# Patient Record
Sex: FEMALE | Race: WHITE | Employment: OTHER | RURAL
[De-identification: names, ages, dates, MRNs, and addresses within clinical notes are randomized per-mention and may not be internally consistent; named-entity substitution may affect disease eponyms.]

---

## 2017-02-15 ENCOUNTER — OFFICE VISIT (OUTPATIENT)
Dept: FAMILY MEDICINE CLINIC | Age: 72
End: 2017-02-15

## 2017-02-15 VITALS
DIASTOLIC BLOOD PRESSURE: 72 MMHG | OXYGEN SATURATION: 98 % | WEIGHT: 213 LBS | HEART RATE: 82 BPM | BODY MASS INDEX: 33.36 KG/M2 | TEMPERATURE: 99 F | RESPIRATION RATE: 18 BRPM | SYSTOLIC BLOOD PRESSURE: 130 MMHG

## 2017-02-15 DIAGNOSIS — R05.9 COUGH: ICD-10-CM

## 2017-02-15 DIAGNOSIS — I48.20 CHRONIC ATRIAL FIBRILLATION (HCC): Primary | ICD-10-CM

## 2017-02-15 RX ORDER — BENZONATATE 100 MG/1
100 CAPSULE ORAL
Qty: 30 CAP | Refills: 5 | Status: SHIPPED | OUTPATIENT
Start: 2017-02-15 | End: 2017-02-22

## 2017-02-15 RX ORDER — HYDROCODONE POLISTIREX AND CHLORPHENIRAMINE POLISTIREX 10; 8 MG/5ML; MG/5ML
5 SUSPENSION, EXTENDED RELEASE ORAL
Qty: 473 ML | Refills: 0 | Status: SHIPPED | OUTPATIENT
Start: 2017-02-15 | End: 2017-11-15

## 2017-02-15 NOTE — MR AVS SNAPSHOT
Visit Information Date & Time Provider Department Dept. Phone Encounter #  
 2/15/2017 11:00 AM Castillo Do MD [address] [id number] Upcoming Health Maintenance Date Due Hepatitis C Screening 1945 FOOT EXAM Q1 1/5/1955 MICROALBUMIN Q1 1/5/1955 EYE EXAM RETINAL OR DILATED Q1 1/5/1955 DTaP/Tdap/Td series (1 - Tdap) 1/5/1966 FOBT Q 1 YEAR AGE 50-75 1/5/1995 GLAUCOMA SCREENING Q2Y 1/5/2010 Pneumococcal 65+ Low/Medium Risk (2 of 2 - PPSV23) 2/3/2016 MEDICARE YEARLY EXAM 1/7/2017 HEMOGLOBIN A1C Q6M 3/21/2017 LIPID PANEL Q1 9/21/2017 BREAST CANCER SCRN MAMMOGRAM 4/18/2018 Allergies as of 2/15/2017  Review Complete On: 2/15/2017 By: Castillo Do MD  
  
 Severity Noted Reaction Type Reactions Penicillins  11/14/2013    Unknown (comments) Current Immunizations  Never Reviewed Name Date Influenza Vaccine 9/30/2015, 10/7/2014 Influenza Vaccine (Quad) PF 9/21/2016 10:59 AM  
 Pneumococcal Conjugate (PCV-13) 2/3/2015 Pneumococcal Vaccine (Unspecified Type) 1/25/2008 Varicella Virus Vaccine 6/25/2011 Not reviewed this visit You Were Diagnosed With   
  
 Codes Comments Chronic atrial fibrillation (HCC)    -  Primary ICD-10-CM: D83.4 ICD-9-CM: 427.31 Cough     ICD-10-CM: R05 ICD-9-CM: 141. 2 Vitals BP Pulse Temp Resp Weight(growth percentile) SpO2  
 130/72 (BP 1 Location: Left arm, BP Patient Position: Sitting) 82 99 °F (37.2 °C) (Oral) 18 213 lb (96.6 kg) 98% BMI OB Status Smoking Status 33.36 kg/m2 Hysterectomy Never Smoker Vitals History BMI and BSA Data Body Mass Index Body Surface Area  
 33.36 kg/m 2 2.14 m 2 Preferred Pharmacy Pharmacy Name [address] [phone] Your Updated Medication List  
  
   
 This list is accurate as of: 2/15/17 12:03 PM.  Always use your most recent med list. amLODIPine 10 mg tablet Commonly known as:  [place] Royals Take 1 Tab by mouth daily. apixaban 5 mg tablet Commonly known as:  Princella Fraire Take 1 Tab by mouth two (2) times a day. atorvastatin 20 mg tablet Commonly known as:  LIPITOR  
TAKE 1 TABLET TWICE A DAY  
  
 benzonatate 100 mg capsule Commonly known as:  TESSALON Take 1 Cap by mouth three (3) times daily as needed for Cough for up to 7 days. chlorpheniramine-HYDROcodone 10-8 mg/5 mL suspension Commonly known as:  Milon Benedict Take 5 mL by mouth every twelve (12) hours as needed for Cough. Max Daily Amount: 10 mL. levothyroxine 137 mcg tablet Commonly known as:  SYNTHROID Take 137 mcg by mouth Daily (before breakfast). lisinopril 20 mg tablet Commonly known as:  PRINIVIL, ZESTRIL  
TAKE 1 TABLET DAILY LORazepam 0.5 mg tablet Commonly known as:  ATIVAN Take 1 Tab by mouth every eight (8) hours as needed for Anxiety. Max Daily Amount: 1.5 mg.  
  
 meclizine 25 mg tablet Commonly known as:  ANTIVERT Take  by mouth three (3) times daily as needed. metFORMIN 500 mg tablet Commonly known as:  GLUCOPHAGE Take 1 Tab by mouth three (3) times daily (with meals). omeprazole 40 mg capsule Commonly known as:  PRILOSEC Take 1 Cap by mouth daily. * ONE TOUCH TEST  
by Does Not Apply route daily. Indications: One Touch Verioflex Meter * ONETOUCH ULTRA TEST strip Generic drug:  glucose blood VI test strips  
by Does Not Apply route See Admin Instructions. ONETOUCH FINEPOINT LANCETS  
by Does Not Apply route daily. Indications: Verioflex lancets, E. 11.9  
  
 sertraline 50 mg tablet Commonly known as:  ZOLOFT  
TAKE 1 TABLET NIGHTLY AS NEEDED  
  
 therapeutic multivitamin tablet Commonly known as:  [facility] Take 1 Tab by mouth daily. * Notice: This list has 2 medication(s) that are the same as other medications prescribed for you. Read the directions carefully, and ask your doctor or other care provider to review them with you. Prescriptions Printed Refills  
 benzonatate (TESSALON) 100 mg capsule 5 Sig: Take 1 Cap by mouth three (3) times daily as needed for Cough for up to 7 days. Class: Print Route: Oral  
 chlorpheniramine-HYDROcodone (TUSSIONEX) 10-8 mg/5 mL suspension 0 Sig: Take 5 mL by mouth every twelve (12) hours as needed for Cough. Max Daily Amount: 10 mL. Class: Print Route: Oral  
  
Introducing [facility]! Dear Amy Wilson: 
Thank you for requesting a Flock account. Our records indicate that you already have an active Flock account. You can access your account anytime at [email or web address] Did you know that you can access your hospital and ER discharge instructions at any time in Flock? You can also review all of your test results from your hospital stay or ER visit. Additional Information If you have questions, please visit the Frequently Asked Questions section of the Flock website at [email or web address] Remember, Flock is NOT to be used for urgent needs. For medical emergencies, dial 911. Now available from your iPhone and Android! Please provide this summary of care documentation to your next provider. Your primary care clinician is listed as Nicole Castro. If you have any questions after today's visit, please call [phone].

## 2017-02-15 NOTE — PROGRESS NOTES
Chief Complaint   Patient presents with    Cold Symptoms     cough, shoulder and neck pain, sore throat. HPI:       is a 67 y.o. female. Ngoc Escalona had AF and underwent Cryoballoon ablation of her pulmonary veins at [facility] with Dr Vladislav Velazquez team on October 24, 2015. The results were excellent and she had no inducible arrhythmias. Ngoc Escalona was discharged on Apixaban and Dofetilide. The plan is to discontinue the Dofetilide in about 90 days (end of Jan 2016) if she remains in NSR. She has hypothyroidism and is on thyroid replacement therapy. Ngoc Escalona was noted to have an A1c of 9.2 in early October 2015. She is taking Metformin, Lisinopril and Atorvastatin. She had previously been well controlled with diet and exercise. New Issues:  Nonproductive cough 4 days. No bloody mucus, fever, rigors, drenching sweats. Allergies   Allergen Reactions    Penicillins Unknown (comments)       Current Outpatient Prescriptions   Medication Sig    lisinopril (PRINIVIL, ZESTRIL) 20 mg tablet TAKE 1 TABLET DAILY    sertraline (ZOLOFT) 50 mg tablet TAKE 1 TABLET NIGHTLY AS NEEDED    atorvastatin (LIPITOR) 20 mg tablet TAKE 1 TABLET TWICE A DAY    levothyroxine (SYNTHROID) 137 mcg tablet Take 137 mcg by mouth Daily (before breakfast).  omeprazole (PRILOSEC) 40 mg capsule Take 1 Cap by mouth daily.  amLODIPine (NORVASC) 10 mg tablet Take 1 Tab by mouth daily.  metFORMIN (GLUCOPHAGE) 500 mg tablet Take 1 Tab by mouth three (3) times daily (with meals).  apixaban (ELIQUIS) 5 mg tablet Take 1 Tab by mouth two (2) times a day.  BLOOD SUGAR DIAGNOSTIC (ONE TOUCH TEST) by Does Not Apply route daily. Indications: One Touch Verioflex Meter    ONETOUCH FINEPOINT LANCETS by Does Not Apply route daily. Indications: Verioflex lancets, E. 11.9    glucose blood VI test strips (ONETOUCH ULTRA TEST) strip by Does Not Apply route See Admin Instructions.     LORazepam (ATIVAN) 0.5 mg tablet Take 1 Tab by mouth every eight (8) hours as needed for Anxiety. Max Daily Amount: 1.5 mg.    meclizine (ANTIVERT) 25 mg tablet Take  by mouth three (3) times daily as needed.  therapeutic multivitamin (THERAGRAN) tablet Take 1 Tab by mouth daily. No current facility-administered medications for this visit. Past Medical History   Diagnosis Date    Atrial fibrillation [facility])     Cholecystitis July 6, 2016    Diabetes [facility])     Dizziness     Hyperlipidemia     Hypertension     Hypothyroidism     OA (osteoarthritis) of knee     Pain, wrist      Carpal tunnel?  Unspecified sleep apnea      CPAP mask         ROS:  Denies nausea, vomiting, or diarrhea. Denies wt loss, wt gain, hemoptysis, hematochezia or melena. Physical Examination:    Visit Vitals    /72 (BP 1 Location: Left arm, BP Patient Position: Sitting)    Pulse 82    Temp 99 °F (37.2 °C) (Oral)    Resp 18    Wt 213 lb (96.6 kg)    SpO2 98%    BMI 33.36 kg/m2     General: Alert and Ox3, Fluent speech  HEENT:  NC/AT, EOMI, OP: clear  Neck:  Supple, no adenopathy, JVD, mass or bruit  Chest:  Clear to Ausculation, without wheezes, rales, rubs or ronchi  Cardiac: RRR  Abdomen:  +BS, soft, nontender without palpable HSM  Extremities:  No cyanosis, clubbing or edema  Neurologic:  Ambulatory without assist, CN 2-12 grossly intact. Moves all extremities. Skin: no rash  Lymphadenopathy: no cervical or supraclavicular nodes    Wt Readings from Last 3 Encounters:   02/15/17 213 lb (96.6 kg)   09/21/16 213 lb 6.4 oz (96.8 kg)   07/20/16 213 lb (96.6 kg)       Lab Results   Component Value Date/Time    Hemoglobin A1c 8.8 09/21/2016 10:50 AM       Lab Results   Component Value Date/Time    TSH 0.283 09/21/2016 10:50 AM           ASSESSMENT AND PLAN:     1.  AF:  Stable post ablation  2. Viral URI with cough:  Symptomatic relief with Tessalon and TUssionex at HS.   The patient was advised to return to (or contact) the clinic or go to the ER for any ALARM sx such as temp > 101.5, worsening cough, sputum production, confusion or shortness of breath. No orders of the defined types were placed in this encounter.       Karlos Kwok MD, [address]

## 2017-08-09 ENCOUNTER — TELEPHONE (OUTPATIENT)
Dept: FAMILY MEDICINE CLINIC | Age: 72
End: 2017-08-09

## 2017-08-09 ENCOUNTER — OFFICE VISIT (OUTPATIENT)
Dept: FAMILY MEDICINE CLINIC | Age: 72
End: 2017-08-09

## 2017-08-09 VITALS
OXYGEN SATURATION: 95 % | RESPIRATION RATE: 16 BRPM | HEART RATE: 98 BPM | SYSTOLIC BLOOD PRESSURE: 128 MMHG | DIASTOLIC BLOOD PRESSURE: 68 MMHG | BODY MASS INDEX: 34.14 KG/M2 | WEIGHT: 218 LBS

## 2017-08-09 DIAGNOSIS — R07.89 CHEST PRESSURE: ICD-10-CM

## 2017-08-09 DIAGNOSIS — I48.20 CHRONIC ATRIAL FIBRILLATION (HCC): ICD-10-CM

## 2017-08-09 DIAGNOSIS — R00.2 PALPITATIONS: ICD-10-CM

## 2017-08-09 DIAGNOSIS — R00.2 HEART PALPITATIONS: ICD-10-CM

## 2017-08-09 DIAGNOSIS — Z00.00 MEDICARE ANNUAL WELLNESS VISIT, SUBSEQUENT: Primary | ICD-10-CM

## 2017-08-09 DIAGNOSIS — E11.9 TYPE 2 DIABETES MELLITUS WITHOUT COMPLICATION, WITHOUT LONG-TERM CURRENT USE OF INSULIN (HCC): ICD-10-CM

## 2017-08-09 DIAGNOSIS — E03.4 HYPOTHYROIDISM DUE TO ACQUIRED ATROPHY OF THYROID: ICD-10-CM

## 2017-08-09 RX ORDER — MELATONIN
DAILY
COMMUNITY
End: 2017-08-15 | Stop reason: ALTCHOICE

## 2017-08-09 RX ORDER — LORAZEPAM 0.5 MG/1
0.5 TABLET ORAL
Qty: 30 TAB | Refills: 1 | Status: SHIPPED | OUTPATIENT
Start: 2017-08-09 | End: 2019-04-05

## 2017-08-09 NOTE — ACP (ADVANCE CARE PLANNING)
Chief Complaint   Patient presents with    Chest Pain     heaviness, palpitations         HPI:       is a 67 y.o. female. History of PAF. Notes she is under stress. Lorazepam has helped. chest pressure all day for the past several days. No abdominal pain. Has never been cathed. Last nuclear EST several years ago and was normal.       Denies SOB or leg edema. T2DM:  Has not had labs in several months. New Issues:  Due for SAWV    Allergies   Allergen Reactions    Penicillins Unknown (comments)       Current Outpatient Prescriptions   Medication Sig    cholecalciferol (VITAMIN D3) 1,000 unit tablet Take  by mouth daily.  LORazepam (ATIVAN) 0.5 mg tablet Take 1 Tab by mouth every eight (8) hours as needed for Anxiety. Max Daily Amount: 1.5 mg.    amLODIPine (NORVASC) 10 mg tablet TAKE 1 TABLET DAILY    lisinopril (PRINIVIL, ZESTRIL) 20 mg tablet TAKE 1 TABLET DAILY    sertraline (ZOLOFT) 50 mg tablet TAKE 1 TABLET NIGHTLY AS NEEDED    levothyroxine (SYNTHROID) 137 mcg tablet Take 137 mcg by mouth Daily (before breakfast).  omeprazole (PRILOSEC) 40 mg capsule Take 1 Cap by mouth daily.  metFORMIN (GLUCOPHAGE) 500 mg tablet Take 1 Tab by mouth three (3) times daily (with meals).  apixaban (ELIQUIS) 5 mg tablet Take 1 Tab by mouth two (2) times a day.  BLOOD SUGAR DIAGNOSTIC (ONE TOUCH TEST) by Does Not Apply route daily. Indications: One Touch Verioflex Meter    ONETOUCH FINEPOINT LANCETS by Does Not Apply route daily. Indications: Verioflex lancets, E. 11.9    glucose blood VI test strips (ONETOUCH ULTRA TEST) strip by Does Not Apply route See Admin Instructions.  therapeutic multivitamin (THERAGRAN) tablet Take 1 Tab by mouth daily.  chlorpheniramine-HYDROcodone (TUSSIONEX) 10-8 mg/5 mL suspension Take 5 mL by mouth every twelve (12) hours as needed for Cough. Max Daily Amount: 10 mL.     atorvastatin (LIPITOR) 20 mg tablet TAKE 1 TABLET TWICE A DAY    meclizine (ANTIVERT) 25 mg tablet Take  by mouth three (3) times daily as needed. No current facility-administered medications for this visit. Past Medical History:   Diagnosis Date    Atrial fibrillation [facility])     Cholecystitis July 6, 2016    Diabetes [facility])     Dizziness     Hyperlipidemia     Hypertension     Hypothyroidism     OA (osteoarthritis) of knee     Pain, wrist     Carpal tunnel?  Unspecified sleep apnea     CPAP mask         ROS:  Denies fever, chills, cough, chest pain, SOB,  nausea, vomiting, or diarrhea. Denies wt loss, wt gain, hemoptysis, hematochezia or melena. Physical Examination:    /68 (BP 1 Location: Left arm, BP Patient Position: Sitting)  Pulse 98  Resp 16  Wt 218 lb (98.9 kg)  SpO2 95%  BMI 34.14 kg/m2    General: Alert and Ox3, Fluent speech  HEENT:  NC/AT, EOMI, OP: clear  Neck:  Supple, no adenopathy, JVD, mass or bruit  Chest:  Clear to Ausculation, without wheezes, rales, rubs or ronchi  Cardiac: RRR with a soft 2/6 TOM  Abdomen:  +BS, soft, nontender without palpable HSM  Extremities:  No cyanosis, clubbing or edema  Neurologic:  Ambulatory without assist, CN 2-12 grossly intact. Moves all extremities. Skin: no rash  Lymphadenopathy: no cervical or supraclavicular nodes      ASSESSMENT AND PLAN:     1. Chest pressure with normal EKG today:  Arrange for nuclear EST, CXR and ECHO. 2.  T2DM:  Labs today  3. SAWV:    4.  Will review data with José Miguel Thomas next week and consider other options if sx remain  5. Understands she is to go to the ER if sx escalate.     Orders Placed This Encounter    NM CARDIAC SPECT W STRS/REST MULT     Standing Status:   Future     Standing Expiration Date:   9/8/2018     Scheduling Instructions:      [facility]     Standing Status:   Future     Standing Expiration Date:   9/9/2018     Scheduling Instructions:      [facility]     Order Specific Question:   Reason for Exam     Answer: chest pressure     Order Specific Question:   Is Patient Allergic to Contrast Dye? Answer:   Unknown    CBC WITH AUTOMATED DIFF    LIPID PANEL    METABOLIC PANEL, COMPREHENSIVE    TSH 3RD GENERATION    HEMOGLOBIN A1C WITH EAG    AMB POC EKG ROUTINE W/ 12 LEADS, INTER & REP     Order Specific Question:   Reason for Exam:     Answer:   palpitations    2D ECHO COMPLETE ADULT (TTE) W OR WO CONTR     Standing Status:   Future     Standing Expiration Date:   2/9/2018     Scheduling Instructions:      \[facility]\""     Order Specific Question:   Reason for Exam:     Answer:   chest pressure     Order Specific Question:   Contrast Enhancement (Bubble Study, Definity, Optison) may be used if criteria listed in established evidence-based protocol has been identified. Answer: Yes    cholecalciferol (VITAMIN D3) 1,000 unit tablet     Sig: Take  by mouth daily.  LORazepam (ATIVAN) 0.5 mg tablet     Sig: Take 1 Tab by mouth every eight (8) hours as needed for Anxiety. Max Daily Amount: 1.5 mg.     Dispense:  30 Tab     Refill:  1       Marlin Meraz MD, FACP        ______________________________________________________________________    Amparo Cabot is a 67 y.o. female and presents for annual Medicare Wellness Visit. Problem List: Reviewed with patient and discussed risk factors. Patient Active Problem List   Diagnosis Code    Hypertension I10    Hyperlipidemia E78.5    Chronic atrial fibrillation ([facility] [address] I48.2    Cholecystitis K81.9    Aortic systolic murmur on examination I35.8    Type 2 diabetes mellitus without complication, without long-term current use of insulin (HCC) E11.9    Hypothyroidism due to acquired atrophy of thyroid E03.4       Current medical providers:  Patient Care Team:  Millie Eid MD as PCP - General (Internal Medicine)    PMH, , Medications/Allergies: reviewed, on chart. Female Alcohol Screening:   On any occasion during the past 3 months, have you had more than 3 drinks containing alcohol? No    Do you average more than 7 drinks per week? No    ROS:  Constitutional: No fever, chills or weight loss  Respiratory: No cough, SOB   CV: No chest pain or Palpitations    Objective:  Visit Vitals    /68 (BP 1 Location: Left arm, BP Patient Position: Sitting)    Pulse 98    Resp 16    Wt 218 lb (98.9 kg)    SpO2 95%    BMI 34.14 kg/m2    Body mass index is 34.14 kg/(m^2). Assessment of cognitive impairment: Alert and oriented x 3    Depression Screen:   PHQ over the last two weeks 2/15/2017   Little interest or pleasure in doing things Not at all   Feeling down, depressed or hopeless Not at all   Total Score PHQ 2 0       Fall Risk Assessment:    Fall Risk Assessment, last 12 mths 2/15/2017   Able to walk? Yes   Fall in past 12 months? No       Functional Ability:   Does the patient exhibit a steady gait? yes   How long did it take the patient to get up and walk from a sitting position? 12 sec   Is the patient self reliant?  (ie can do own laundry, meals, household chores)  yes     Does the patient handle his/her own medications? yes     Does the patient handle his/her own money? yes     Is the patients home safe (ie good lighting, handrails on stairs and bath, etc.)? yes     Did you notice or did patient express any hearing difficulties? yes     Did you notice or did patient express any vision difficulties?    no       Advance Care Planning:   Patient was offered the opportunity to discuss advance care planning:  yes     Does patient have an Advance Directive:  yes   If no, did you provide information on Caring Connections?  no       Plan:      Orders Placed This Encounter    NM CARDIAC SPECT W STRS/REST MULT    XR CHEST PA LAT    CBC WITH AUTOMATED DIFF    LIPID PANEL    METABOLIC PANEL, COMPREHENSIVE    TSH 3RD GENERATION    HEMOGLOBIN A1C WITH EAG    AMB POC EKG ROUTINE W/ 12 LEADS, INTER & REP    2D ECHO COMPLETE ADULT (TTE) W OR WO CONTR    cholecalciferol (VITAMIN D3) 1,000 unit tablet    LORazepam (ATIVAN) 0.5 mg tablet       Health Maintenance   Topic Date Due    Hepatitis C Screening  1945    FOOT EXAM Q1  01/05/1955    MICROALBUMIN Q1  01/05/1955    GLAUCOMA SCREENING Q2Y  01/05/2010    MEDICARE YEARLY EXAM  01/07/2017    HEMOGLOBIN A1C Q6M  03/21/2017    INFLUENZA AGE 9 TO ADULT  10/30/2017 (Originally 8/1/2017)    EYE EXAM RETINAL OR DILATED Q1  08/01/2018 (Originally 1/5/1955)    LIPID PANEL Q1  09/21/2017    BREAST CANCER SCRN MAMMOGRAM  04/18/2018    COLONOSCOPY  11/14/2023    DTaP/Tdap/Td series (2 - Td) 07/05/2027    OSTEOPOROSIS SCREENING (DEXA)  Completed    ZOSTER VACCINE AGE 60>  Completed    Pneumococcal 65+ Low/Medium Risk  Addressed       *Patient verbalized understanding and agreement with the plan. A copy of the After Visit Summary with personalized health plan was given to the patient today.

## 2017-08-09 NOTE — PATIENT INSTRUCTIONS
If you have any questions regarding [employer], you may call [employer] support at [phone].

## 2017-08-09 NOTE — MR AVS SNAPSHOT
Visit Information Date & Time Provider Department Dept. Phone Encounter #  
 8/9/2017  9:50 AM Cory Ferreira MD [address] [place] [address] [id number] Follow-up Instructions Return in about 1 week (around 8/16/2017). Follow-up and Disposition History Your Appointments 8/15/2017 11:00 AM  
ESTABLISHED PATIENT with MD [address]uleika [address] ([facility]) Appt Note: 6 month f/u; R/S,  [id number] Dr. Aurora Garcia 1000 [facility] [address] [facility],5[address] [address] [phone]  
  
   
 1000 [address] [address] Upcoming Health Maintenance Date Due Hepatitis C Screening 1945 FOOT EXAM Q1 1/5/1955 MICROALBUMIN Q1 1/5/1955 GLAUCOMA SCREENING Q2Y 1/5/2010 MEDICARE YEARLY EXAM 1/7/2017 HEMOGLOBIN A1C Q6M 3/21/2017 INFLUENZA AGE 9 TO ADULT 10/30/2017* EYE EXAM RETINAL OR DILATED Q1 8/1/2018* LIPID PANEL Q1 9/21/2017 BREAST CANCER SCRN MAMMOGRAM 4/18/2018 COLONOSCOPY 11/14/2023 DTaP/Tdap/Td series (2 - Td) 7/5/2027 *Topic was postponed. The date shown is not the original due date. Allergies as of 8/9/2017  Review Complete On: 8/9/2017 By: Cory Ferreira MD  
  
 Severity Noted Reaction Type Reactions Penicillins  11/14/2013    Unknown (comments) Current Immunizations  Never Reviewed Name Date Influenza Vaccine 9/30/2015, 10/7/2014 Influenza Vaccine (Quad) PF 9/21/2016 10:59 AM  
 Pneumococcal Conjugate (PCV-13) 2/3/2015 Pneumococcal Vaccine (Unspecified Type) 1/25/2008 Varicella Virus Vaccine 6/25/2011 Not reviewed this visit You Were Diagnosed With   
  
 Codes Comments Medicare annual wellness visit, subsequent    -  Primary ICD-10-CM: Z00.00 ICD-9-CM: V70.0 Chronic atrial fibrillation (HCC)     ICD-10-CM: I82.4 ICD-9-CM: 427.31 Heart palpitations     ICD-10-CM: R00.2 ICD-9-CM: 785.1 Palpitations     ICD-10-CM: R00.2 ICD-9-CM: 785.1 Chest pressure     ICD-10-CM: R07.89 ICD-9-CM: 786.59 Type 2 diabetes mellitus without complication, without long-term current use of insulin (HCC)     ICD-10-CM: E11.9 ICD-9-CM: 250.00 Hypothyroidism due to acquired atrophy of thyroid     ICD-10-CM: E03.4 ICD-9-CM: 244.8, 246.8 Vitals BP Pulse Resp Weight(growth percentile) SpO2 BMI  
 128/68 (BP 1 Location: Left arm, BP Patient Position: Sitting) 98 16 218 lb (98.9 kg) 95% 34.14 kg/m2 OB Status Smoking Status Hysterectomy Never Smoker BMI and BSA Data Body Mass Index Body Surface Area  
 34.14 kg/m 2 2.16 m 2 Preferred Pharmacy Pharmacy Name Phone 100 [address]a Dailey [facility] [phone] Your Updated Medication List  
  
   
This list is accurate as of: 8/9/17 10:58 AM.  Always use your most recent med list. amLODIPine 10 mg tablet Commonly known as:  Shabnam Jer TAKE 1 TABLET DAILY  
  
 apixaban 5 mg tablet Commonly known as:  Morna Phillips Take 1 Tab by mouth two (2) times a day. atorvastatin 20 mg tablet Commonly known as:  LIPITOR  
TAKE 1 TABLET TWICE A DAY  
  
 chlorpheniramine-HYDROcodone 10-8 mg/5 mL suspension Commonly known as:  Twan Sicard Take 5 mL by mouth every twelve (12) hours as needed for Cough. Max Daily Amount: 10 mL. levothyroxine 137 mcg tablet Commonly known as:  SYNTHROID Take 137 mcg by mouth Daily (before breakfast). lisinopril 20 mg tablet Commonly known as:  PRINIVIL, ZESTRIL  
TAKE 1 TABLET DAILY LORazepam 0.5 mg tablet Commonly known as:  ATIVAN Take 1 Tab by mouth every eight (8) hours as needed for Anxiety. Max Daily Amount: 1.5 mg.  
  
 meclizine 25 mg tablet Commonly known as:  ANTIVERT Take  by mouth three (3) times daily as needed. metFORMIN 500 mg tablet Commonly known as:  GLUCOPHAGE  
 Take 1 Tab by mouth three (3) times daily (with meals). omeprazole 40 mg capsule Commonly known as:  PRILOSEC Take 1 Cap by mouth daily. * ONE TOUCH TEST  
by Does Not Apply route daily. Indications: One Touch Verioflex Meter * ONETOUCH ULTRA TEST strip Generic drug:  glucose blood VI test strips  
by Does Not Apply route See Admin Instructions. ONETOUCH FINEPOINT LANCETS  
by Does Not Apply route daily. Indications: Verioflex lancets, E. 11.9  
  
 sertraline 50 mg tablet Commonly known as:  ZOLOFT  
TAKE 1 TABLET NIGHTLY AS NEEDED  
  
 therapeutic multivitamin tablet Commonly known as:  [facility] Take 1 Tab by mouth daily. VITAMIN D3 1,000 unit tablet Generic drug:  cholecalciferol Take  by mouth daily. * Notice: This list has 2 medication(s) that are the same as other medications prescribed for you. Read the directions carefully, and ask your doctor or other care provider to review them with you. Prescriptions Printed Refills LORazepam (ATIVAN) 0.5 mg tablet 1 Sig: Take 1 Tab by mouth every eight (8) hours as needed for Anxiety. Max Daily Amount: 1.5 mg.  
 Class: Print Route: Oral  
  
We Performed the Following AMB POC EKG ROUTINE W/ 12 LEADS, INTER & REP [97441 CPT(R)] CBC WITH AUTOMATED DIFF [80616 CPT(R)] HEMOGLOBIN A1C WITH EAG [57606 CPT(R)] LIPID PANEL [30306 CPT(R)] METABOLIC PANEL, COMPREHENSIVE [63253 CPT(R)] TSH 3RD GENERATION [09259 CPT(R)] Follow-up Instructions Return in about 1 week (around 8/16/2017). To-Do List   
 08/09/2017 Imaging:  NM CARDIAC SPECT W STRS/REST MULT   
  
 08/14/2017 ECHO:  2D ECHO COMPLETE ADULT (TTE) W OR WO CONTR Around 08/23/2017 Imaging:  XR CHEST PA LAT Patient Instructions If you have any questions regarding InstantQuestt, you may call artaculous support at [phone]. Introducing [facility] & HEALTH SERVICES! Dear Dharmesh Rommel: Thank you for requesting a zhiwo account. Our records indicate that you already have an active zhiwo account. You can access your account anytime at [email or web address]. [employer]/userADgents Did you know that you can access your hospital and ER discharge instructions at any time in zhiwo? You can also review all of your test results from your hospital stay or ER visit. Additional Information If you have questions, please visit the Frequently Asked Questions section of the zhiwo website at [email or web address]. [employer]/userADgents/. Remember, zhiwo is NOT to be used for urgent needs. For medical emergencies, dial 911. Now available from your iPhone and Android! Please provide this summary of care documentation to your next provider. Your primary care clinician is listed as Homa Cote. If you have any questions after today's visit, please call [phone].

## 2017-08-10 LAB
ALBUMIN SERPL-MCNC: 5.2 G/DL (ref 3.5–4.8)
ALBUMIN/GLOB SERPL: 1.9 {RATIO} (ref 1.2–2.2)
ALP SERPL-CCNC: 139 IU/L (ref 39–117)
ALT SERPL-CCNC: 15 IU/L (ref 0–32)
AST SERPL-CCNC: 18 IU/L (ref 0–40)
BASOPHILS # BLD AUTO: 0.1 X10E3/UL (ref 0–0.2)
BASOPHILS NFR BLD AUTO: 1 %
BILIRUB SERPL-MCNC: 0.3 MG/DL (ref 0–1.2)
BUN SERPL-MCNC: 23 MG/DL (ref 8–27)
BUN/CREAT SERPL: 26 (ref 12–28)
CALCIUM SERPL-MCNC: 10.8 MG/DL (ref 8.7–10.3)
CHLORIDE SERPL-SCNC: 99 MMOL/L (ref 96–106)
CHOLEST SERPL-MCNC: 233 MG/DL (ref 100–199)
CO2 SERPL-SCNC: 26 MMOL/L (ref 18–29)
CREAT SERPL-MCNC: 0.89 MG/DL (ref 0.57–1)
EOSINOPHIL # BLD AUTO: 0.2 X10E3/UL (ref 0–0.4)
EOSINOPHIL NFR BLD AUTO: 2 %
ERYTHROCYTE [DISTWIDTH] IN BLOOD BY AUTOMATED COUNT: 13.8 % (ref 12.3–15.4)
EST. AVERAGE GLUCOSE BLD GHB EST-MCNC: 226 MG/DL
GLOBULIN SER CALC-MCNC: 2.8 G/DL (ref 1.5–4.5)
GLUCOSE SERPL-MCNC: 179 MG/DL (ref 65–99)
HBA1C MFR BLD: 9.5 % (ref 4.8–5.6)
HCT VFR BLD AUTO: 38.3 % (ref 34–46.6)
HDLC SERPL-MCNC: 69 MG/DL
HGB BLD-MCNC: 12.7 G/DL (ref 11.1–15.9)
IMM GRANULOCYTES # BLD: 0 X10E3/UL (ref 0–0.1)
IMM GRANULOCYTES NFR BLD: 0 %
LDLC SERPL CALC-MCNC: 129 MG/DL (ref 0–99)
LYMPHOCYTES # BLD AUTO: 3.3 X10E3/UL (ref 0.7–3.1)
LYMPHOCYTES NFR BLD AUTO: 35 %
MCH RBC QN AUTO: 28.4 PG (ref 26.6–33)
MCHC RBC AUTO-ENTMCNC: 33.2 G/DL (ref 31.5–35.7)
MCV RBC AUTO: 86 FL (ref 79–97)
MONOCYTES # BLD AUTO: 0.7 X10E3/UL (ref 0.1–0.9)
MONOCYTES NFR BLD AUTO: 8 %
NEUTROPHILS # BLD AUTO: 5 X10E3/UL (ref 1.4–7)
NEUTROPHILS NFR BLD AUTO: 54 %
PLATELET # BLD AUTO: 359 X10E3/UL (ref 150–379)
POTASSIUM SERPL-SCNC: 5.7 MMOL/L (ref 3.5–5.2)
PROT SERPL-MCNC: 8 G/DL (ref 6–8.5)
RBC # BLD AUTO: 4.47 X10E6/UL (ref 3.77–5.28)
SODIUM SERPL-SCNC: 146 MMOL/L (ref 134–144)
TRIGL SERPL-MCNC: 176 MG/DL (ref 0–149)
TSH SERPL DL<=0.005 MIU/L-ACNC: 1.39 UIU/ML (ref 0.45–4.5)
VLDLC SERPL CALC-MCNC: 35 MG/DL (ref 5–40)
WBC # BLD AUTO: 9.4 X10E3/UL (ref 3.4–10.8)

## 2017-08-15 ENCOUNTER — OFFICE VISIT (OUTPATIENT)
Dept: FAMILY MEDICINE CLINIC | Age: 72
End: 2017-08-15

## 2017-08-15 ENCOUNTER — TELEPHONE (OUTPATIENT)
Dept: FAMILY MEDICINE CLINIC | Age: 72
End: 2017-08-15

## 2017-08-15 VITALS
BODY MASS INDEX: 33.83 KG/M2 | HEART RATE: 84 BPM | DIASTOLIC BLOOD PRESSURE: 64 MMHG | OXYGEN SATURATION: 97 % | RESPIRATION RATE: 16 BRPM | SYSTOLIC BLOOD PRESSURE: 130 MMHG | WEIGHT: 216 LBS

## 2017-08-15 DIAGNOSIS — I10 ESSENTIAL HYPERTENSION: ICD-10-CM

## 2017-08-15 DIAGNOSIS — G47.33 OSA (OBSTRUCTIVE SLEEP APNEA): ICD-10-CM

## 2017-08-15 DIAGNOSIS — E11.9 TYPE 2 DIABETES MELLITUS WITHOUT COMPLICATION, WITHOUT LONG-TERM CURRENT USE OF INSULIN (HCC): Primary | ICD-10-CM

## 2017-08-15 RX ORDER — HYDROCHLOROTHIAZIDE 12.5 MG/1
12.5 TABLET ORAL DAILY
Qty: 90 TAB | Refills: 4 | Status: SHIPPED | OUTPATIENT
Start: 2017-08-15 | End: 2018-02-23 | Stop reason: CLARIF

## 2017-08-15 RX ORDER — ATORVASTATIN CALCIUM 20 MG/1
20 TABLET, FILM COATED ORAL DAILY
Qty: 90 TAB | Refills: 4
Start: 2017-08-15 | End: 2017-11-15 | Stop reason: ALTCHOICE

## 2017-08-15 NOTE — PROGRESS NOTES
Chief Complaint   Patient presents with    Diabetes    Hypertension         HPI:       is a 67 y.o. female seen today with an A1c of 9. Presently taking Metformin. Interested in seeking guidance about diet with Julissa Washington RD. Reviewed med list today:  Ca++ and K+ both elevated. She is not taking KCL or NSAIDs but does take an ACEI. History of BRENT. Having problems with gear and has not seen a sleep specialist in several years. Scheduled to have nuclear EST next week. Allergies   Allergen Reactions    Penicillins Unknown (comments)       Current Outpatient Prescriptions   Medication Sig    cholecalciferol (VITAMIN D3) 1,000 unit tablet Take  by mouth daily.  LORazepam (ATIVAN) 0.5 mg tablet Take 1 Tab by mouth every eight (8) hours as needed for Anxiety. Max Daily Amount: 1.5 mg.    amLODIPine (NORVASC) 10 mg tablet TAKE 1 TABLET DAILY    lisinopril (PRINIVIL, ZESTRIL) 20 mg tablet TAKE 1 TABLET DAILY    sertraline (ZOLOFT) 50 mg tablet TAKE 1 TABLET NIGHTLY AS NEEDED    levothyroxine (SYNTHROID) 137 mcg tablet Take 137 mcg by mouth Daily (before breakfast).  omeprazole (PRILOSEC) 40 mg capsule Take 1 Cap by mouth daily.  metFORMIN (GLUCOPHAGE) 500 mg tablet Take 1 Tab by mouth three (3) times daily (with meals).  apixaban (ELIQUIS) 5 mg tablet Take 1 Tab by mouth two (2) times a day.  BLOOD SUGAR DIAGNOSTIC (ONE TOUCH TEST) by Does Not Apply route daily. Indications: One Touch Verioflex Meter    ONETOUCH FINEPOINT LANCETS by Does Not Apply route daily. Indications: Verioflex lancets, E. 11.9    glucose blood VI test strips (ONETOUCH ULTRA TEST) strip by Does Not Apply route See Admin Instructions.  therapeutic multivitamin (THERAGRAN) tablet Take 1 Tab by mouth daily.  chlorpheniramine-HYDROcodone (TUSSIONEX) 10-8 mg/5 mL suspension Take 5 mL by mouth every twelve (12) hours as needed for Cough. Max Daily Amount: 10 mL.     atorvastatin (LIPITOR) 20 mg tablet TAKE 1 TABLET TWICE A DAY    meclizine (ANTIVERT) 25 mg tablet Take  by mouth three (3) times daily as needed. No current facility-administered medications for this visit. Past Medical History:   Diagnosis Date    Atrial fibrillation [facility])     Cholecystitis July 6, 2016    Diabetes [facility])     Dizziness     Hyperlipidemia     Hypertension     Hypothyroidism     OA (osteoarthritis) of knee     Pain, wrist     Carpal tunnel?  Unspecified sleep apnea     CPAP mask         ROS:  Denies fever, chills, cough, chest pain, SOB,  nausea, vomiting, or diarrhea. Denies wt loss, wt gain, hemoptysis, hematochezia or melena. Physical Examination:    /64 (BP 1 Location: Left arm, BP Patient Position: Sitting)  Pulse 84  Resp 16  Wt 216 lb (98 kg)  SpO2 97%  BMI 33.83 kg/m2    General: Alert and Ox3, Fluent speech  HEENT:  NC/AT, EOMI, OP: clear  Neck:  Supple, no adenopathy, JVD, mass or bruit  Chest:  Clear to Ausculation, without wheezes, rales, rubs or ronchi  Cardiac: RRR  Abdomen:  +BS, soft, nontender without palpable HSM  Extremities:  No cyanosis, clubbing or edema  Neurologic:  Ambulatory without assist, CN 2-12 grossly intact. Moves all extremities. Skin: no rash  Lymphadenopathy: no cervical or supraclavicular nodes      ASSESSMENT AND PLAN:     1. T2DM:  Referral to Maurilio Mcgee RD. Goal:  Slow, gradual and sustained weight loss over 1-2 yr period. Continue Metformin. Re evaluate A1c in 90 days. If not lower, will add Glimepiride or possibly Lantus. 2.  HTN:  Starting HCTZ 12.5 mg daily  3. Hyperkalemia:  Stop Lisinopril  4. Elevated Calcium:  Stop Vitamin D. Re assess K+ and Ca++ in 2-3 weeks (CMP ordered)  5. Sleep referral to [facility]   6. Med list reviewed and revised today  7. Recent chest pain has not recurred; however, we will proceed with Nuclear EST next week.   Is due for Echo and CXR    No orders of the defined types were placed in this encounter.       Jose Sweet MD, Faviola Smith

## 2017-08-15 NOTE — MR AVS SNAPSHOT
Visit Information Date & Time Provider Department Dept. Phone Encounter #  
 8/15/2017 11:00 AM Adrián Quispe MD [address] [place] [address] [id number] Upcoming Health Maintenance Date Due Hepatitis C Screening 1945 FOOT EXAM Q1 1/5/1955 MICROALBUMIN Q1 1/5/1955 GLAUCOMA SCREENING Q2Y 1/5/2010 INFLUENZA AGE 9 TO ADULT 10/30/2017* EYE EXAM RETINAL OR DILATED Q1 8/1/2018* HEMOGLOBIN A1C Q6M 2/9/2018 BREAST CANCER SCRN MAMMOGRAM 4/18/2018 LIPID PANEL Q1 8/9/2018 MEDICARE YEARLY EXAM 8/10/2018 COLONOSCOPY 11/14/2023 DTaP/Tdap/Td series (2 - Td) 7/5/2027 *Topic was postponed. The date shown is not the original due date. Allergies as of 8/15/2017  Review Complete On: 8/15/2017 By: Adrián Quispe MD  
  
 Severity Noted Reaction Type Reactions Penicillins  11/14/2013    Unknown (comments) Current Immunizations  Never Reviewed Name Date Influenza Vaccine 9/30/2015, 10/7/2014 Influenza Vaccine (Quad) PF 9/21/2016 10:59 AM  
 Pneumococcal Conjugate (PCV-13) 2/3/2015 Pneumococcal Vaccine (Unspecified Type) 1/25/2008 Varicella Virus Vaccine 6/25/2011 Not reviewed this visit You Were Diagnosed With   
  
 Codes Comments Type 2 diabetes mellitus without complication, without long-term current use of insulin (HCC)    -  Primary ICD-10-CM: E11.9 ICD-9-CM: 250.00 Essential hypertension     ICD-10-CM: I10 
ICD-9-CM: 401.9 BRENT (obstructive sleep apnea)     ICD-10-CM: G47.33 
ICD-9-CM: 327.23 Vitals BP Pulse Resp Weight(growth percentile) SpO2 BMI  
 130/64 (BP 1 Location: Left arm, BP Patient Position: Sitting) 84 16 216 lb (98 kg) 97% 33.83 kg/m2 OB Status Smoking Status Hysterectomy Never Smoker BMI and BSA Data Body Mass Index Body Surface Area  
 33.83 kg/m 2 2.15 m 2 Preferred Pharmacy Pharmacy Name Phone [facility] [place] [facility] [address] [phone] Your Updated Medication List  
  
   
This list is accurate as of: 8/15/17 11:48 AM.  Always use your most recent med list. amLODIPine 10 mg tablet Commonly known as:  Remcamille Grates TAKE 1 TABLET DAILY  
  
 apixaban 5 mg tablet Commonly known as:  Caroline Nesbitt Take 1 Tab by mouth two (2) times a day. atorvastatin 20 mg tablet Commonly known as:  LIPITOR Take 1 Tab by mouth daily. chlorpheniramine-HYDROcodone 10-8 mg/5 mL suspension Commonly known as:  Cheryn Smoke Take 5 mL by mouth every twelve (12) hours as needed for Cough. Max Daily Amount: 10 mL.  
  
 hydroCHLOROthiazide 12.5 mg tablet Commonly known as:  HYDRODIURIL Take 1 Tab by mouth daily. levothyroxine 137 mcg tablet Commonly known as:  SYNTHROID Take 137 mcg by mouth Daily (before breakfast). LORazepam 0.5 mg tablet Commonly known as:  ATIVAN Take 1 Tab by mouth every eight (8) hours as needed for Anxiety. Max Daily Amount: 1.5 mg.  
  
 meclizine 25 mg tablet Commonly known as:  ANTIVERT Take  by mouth three (3) times daily as needed. metFORMIN 500 mg tablet Commonly known as:  GLUCOPHAGE Take 1 Tab by mouth three (3) times daily (with meals). * ONE TOUCH TEST  
by Does Not Apply route daily. Indications: One Touch Verioflex Meter * ONETOUCH ULTRA TEST strip Generic drug:  glucose blood VI test strips  
by Does Not Apply route See Admin Instructions. ONETOUCH FINEPOINT LANCETS  
by Does Not Apply route daily. Indications: Verioflex lancets, E. 11.9  
  
 sertraline 50 mg tablet Commonly known as:  ZOLOFT  
TAKE 1 TABLET NIGHTLY AS NEEDED  
  
 therapeutic multivitamin tablet Commonly known as:  [facility] Take 1 Tab by mouth daily. * Notice: This list has 2 medication(s) that are the same as other medications prescribed for you.  Read the directions carefully, and ask your doctor or other care provider to review them with you. Prescriptions Sent to Pharmacy Refills  
 hydroCHLOROthiazide (HYDRODIURIL) 12.5 mg tablet 4 Sig: Take 1 Tab by mouth daily. Class: Normal  
 Pharmacy: [address] Ph #: [phone] Route: Oral  
  
We Performed the Following REFERRAL TO DIETITIAN [KYC79 Custom] Comments:  
 Please evaluate patient for T2DM. Needs teaching for optimal diet for T2DM and wt loss. Thanks John Quach SLEEP MEDICINE REFERRAL [GOF013 Custom] Comments:  
 Please evaluate patient for SOLO. Equipment is old. She has not been evaluated in several years. Please evaluate and treat. [address] Thanks John Quach To-Do List   
 08/23/2017 8:30 AM  
  Appointment with Lazaro Corea 139 1 at [facility] NUC MED ([phone]) This exam consists of three parts. First, technologists will take images of your heart while you are at rest.  Then, you will perfom a stress test either by walking on a treadmill or recieving a medication. Finally, you will have additional cardiac imaging. The day before your exam, please stop taking beta blockers and cardiac medications until the conclusion of the test, and bring a list of current medications with you. Please do not eat or drink anything after midnight the night prior to your exam date, and wear comfortable clothes and shoes that are suitable for walking or light jogging (women: please wear a short line bra and pants). IF A PATIENT OF DR. JAROD WARD'S: Please continue to take your medication(s) unless instructed otherwise by Dr. Carvel Favre. If you have any questions please contact Solo Cherise at [phone].  
  
 08/23/2017 10:30 AM  
(Arrive by 10:00 AM) Appointment with STRESS ROOM 2 [facility] NON-INVASIVE CARD ([phone]) Referral Information Referral ID Referred By Referred To  
  
 [id number] Vanessa LEWIS Not Available Visits Status Start Date End Date 1 New Request 8/15/17 8/15/18 If your referral has a status of pending review or denied, additional information will be sent to support the outcome of this decision. Referral ID Referred By Referred To  
 [id number] Dian DENT [facility]  
   [address] [place], [facility] Phone: [phone] Visits Status Start Date End Date 1 New Request 8/15/17 8/15/18 If your referral has a status of pending review or denied, additional information will be sent to support the outcome of this decision. Introducing [facility] & HEALTH SERVICES! Dear Amy Escobedo: 
Thank you for requesting a Zopa account. Our records indicate that you already have an active Zopa account. You can access your account anytime at [email or web address] [employer] Did you know that you can access your hospital and ER discharge instructions at any time in Zopa? You can also review all of your test results from your hospital stay or ER visit. Additional Information If you have questions, please visit the Frequently Asked Questions section of the Zopa website at [email or web address] [employer]/L[employer]/. Remember, Zopa is NOT to be used for urgent needs. For medical emergencies, dial 911. Now available from your iPhone and Android! Please provide this summary of care documentation to your next provider. Your primary care clinician is listed as Mirtha Perez. If you have any questions after today's visit, please call [phone].

## 2017-08-15 NOTE — TELEPHONE ENCOUNTER
Patient would like to speak with [facility]. She has questions about todays visit.

## 2017-08-16 LAB
ALBUMIN/CREAT UR: 30.7 MG/G CREAT (ref 0–30)
CREAT UR-MCNC: 114.7 MG/DL
MICROALBUMIN UR-MCNC: 35.2 UG/ML

## 2017-08-23 ENCOUNTER — HOSPITAL ENCOUNTER (OUTPATIENT)
Dept: NUCLEAR MEDICINE | Age: 72
Discharge: HOME | End: 2017-08-23
Attending: INTERNAL MEDICINE
Payer: MEDICARE

## 2017-08-23 ENCOUNTER — HOSPITAL ENCOUNTER (OUTPATIENT)
Dept: NON INVASIVE DIAGNOSTICS | Age: 72
Discharge: HOME | End: 2017-08-23
Attending: INTERNAL MEDICINE
Payer: MEDICARE

## 2017-08-23 DIAGNOSIS — R07.89 OTHER CHEST PAIN: ICD-10-CM

## 2017-08-23 DIAGNOSIS — R07.89 CHEST PRESSURE: ICD-10-CM

## 2017-08-23 LAB
ATTENDING PHYSICIAN, CST07: NORMAL
DIAGNOSIS, 93000: NORMAL
DUKE TM SCORE RESULT, CST14: NORMAL
DUKE TREADMILL SCORE, CST13: 5456
ECG INTERP BEFORE EX, CST11: NORMAL
ECG INTERP DURING EX, CST12: NORMAL
FUNCTIONAL CAPACITY, CST17: NORMAL
KNOWN CARDIAC CONDITION, CST08: NORMAL
MAX. DIASTOLIC BP, CST04: 78 MMHG
MAX. HEART RATE, CST05: 142 BPM
MAX. SYSTOLIC BP, CST03: 190 MMHG
OVERALL BP RESPONSE TO EXERCISE, CST16: NORMAL
OVERALL HR RESPONSE TO EXERCISE, CST15: NORMAL
PEAK EX METS, CST10: 4.6 METS
PROTOCOL NAME, CST01: NORMAL
TEST INDICATION, CST09: NORMAL

## 2017-08-23 PROCEDURE — 93017 CV STRESS TEST TRACING ONLY: CPT

## 2017-08-23 PROCEDURE — 78452 HT MUSCLE IMAGE SPECT MULT: CPT

## 2017-09-12 ENCOUNTER — LAB ONLY (OUTPATIENT)
Dept: FAMILY MEDICINE CLINIC | Age: 72
End: 2017-09-12

## 2017-09-12 NOTE — MR AVS SNAPSHOT
Visit Information Date & Time Provider Department Dept. Phone Encounter #  
 9/12/2017 10:15 AM CM LIVELY NURSE Oscar Dailey [id number] Your Appointments 11/15/2017 10:30 AM  
ESTABLISHED PATIENT with MD Zuleika Vancekatia  ([address]) Appt Note: 3 MO FU  
 1000 [facility] [address] [facility],[address] [address] [phone]  
  
   
 1000 [facility] [address] [facility],[address] [address] Upcoming Health Maintenance Date Due Hepatitis C Screening 1945 FOOT EXAM Q1 1/[birth year]/1955 GLAUCOMA SCREENING Q2Y 1/5/2010 INFLUENZA AGE 9 TO ADULT 10/30/2017* EYE EXAM RETINAL OR DILATED Q1 8/1/2018* HEMOGLOBIN A1C Q6M 2/9/2018 BREAST CANCER SCRN MAMMOGRAM 4/18/2018 LIPID PANEL Q1 8/9/2018 MEDICARE YEARLY EXAM 8/10/2018 MICROALBUMIN Q1 8/15/2018 COLONOSCOPY 11/14/2023 DTaP/Tdap/Td series (2 - Td) 7/5/2027 *Topic was postponed. The date shown is not the original due date. Allergies as of 9/12/2017  Review Complete On: 8/23/2017 By: Fermin Higuera RN Severity Noted Reaction Type Reactions Penicillins  11/14/2013    Unknown (comments) Current Immunizations  Never Reviewed Name Date Influenza Vaccine 9/30/2015, 10/7/2014 Influenza Vaccine (Quad) PF 9/21/2016 10:59 AM  
 Pneumococcal Conjugate (PCV-13) 2/3/2015 Pneumococcal Vaccine (Unspecified Type) 1/25/2008 Varicella Virus Vaccine 6/25/2011 Not reviewed this visit Vitals OB Status Smoking Status Hysterectomy Never Smoker Your Updated Medication List  
  
   
This list is accurate as of: 9/12/17 10:58 AM.  Always use your most recent med list. amLODIPine 10 mg tablet Commonly known as:  Isabel Spruce TAKE 1 TABLET DAILY  
  
 apixaban 5 mg tablet Commonly known as:  Clevester Shawl Take 1 Tab by mouth two (2) times a day. atorvastatin 20 mg tablet Commonly known as:  LIPITOR Take 1 Tab by mouth daily. chlorpheniramine-HYDROcodone 10-8 mg/5 mL suspension Commonly known as:  Wynema Morris Take 5 mL by mouth every twelve (12) hours as needed for Cough. Max Daily Amount: 10 mL.  
  
 hydroCHLOROthiazide 12.5 mg tablet Commonly known as:  HYDRODIURIL Take 1 Tab by mouth daily. levothyroxine 137 mcg tablet Commonly known as:  SYNTHROID Take 137 mcg by mouth Daily (before breakfast). LORazepam 0.5 mg tablet Commonly known as:  ATIVAN Take 1 Tab by mouth every eight (8) hours as needed for Anxiety. Max Daily Amount: 1.5 mg.  
  
 meclizine 25 mg tablet Commonly known as:  ANTIVERT Take  by mouth three (3) times daily as needed. metFORMIN 500 mg tablet Commonly known as:  GLUCOPHAGE Take 1 Tab by mouth three (3) times daily (with meals). * ONE TOUCH TEST  
by Does Not Apply route daily. Indications: One Touch Verioflex Meter * ONETOUCH ULTRA TEST strip Generic drug:  glucose blood VI test strips  
by Does Not Apply route See Admin Instructions. ONETOUCH FINEPOINT LANCETS  
by Does Not Apply route daily. Indications: Verioflex lancets, E. 11.9  
  
 sertraline 50 mg tablet Commonly known as:  ZOLOFT  
TAKE 1 TABLET NIGHTLY AS NEEDED  
  
 therapeutic multivitamin tablet Commonly known as:  [facility] Take 1 Tab by mouth daily. * Notice: This list has 2 medication(s) that are the same as other medications prescribed for you. Read the directions carefully, and ask your doctor or other care provider to review them with you. Introducing [facility] & HEALTH SERVICES! Dear Luciana Cho: 
Thank you for requesting a [employer] account. Our records indicate that you already have an active [employer] account. You can access your account anytime at [email or web address] Did you know that you can access your hospital and ER discharge instructions at any time in [employer]?   You can also review all of your test results from your hospital stay or ER visit. Additional Information If you have questions, please visit the Frequently Asked Questions section of the [employer] website at [email or web address] Remember, [employer] is NOT to be used for urgent needs. For medical emergencies, dial 911. Now available from your iPhone and Android! Please provide this summary of care documentation to your next provider. Your primary care clinician is listed as Joana Christopher. If you have any questions after today's visit, please call [phone].

## 2017-09-13 LAB
ALBUMIN SERPL-MCNC: 4.6 G/DL (ref 3.5–4.8)
ALBUMIN/GLOB SERPL: 1.5 {RATIO} (ref 1.2–2.2)
ALP SERPL-CCNC: 116 IU/L (ref 39–117)
ALT SERPL-CCNC: 15 IU/L (ref 0–32)
AST SERPL-CCNC: 19 IU/L (ref 0–40)
BILIRUB SERPL-MCNC: 0.2 MG/DL (ref 0–1.2)
BUN SERPL-MCNC: 20 MG/DL (ref 8–27)
BUN/CREAT SERPL: 22 (ref 12–28)
CALCIUM SERPL-MCNC: 9.8 MG/DL (ref 8.7–10.3)
CHLORIDE SERPL-SCNC: 94 MMOL/L (ref 96–106)
CO2 SERPL-SCNC: 26 MMOL/L (ref 18–29)
CREAT SERPL-MCNC: 0.92 MG/DL (ref 0.57–1)
GLOBULIN SER CALC-MCNC: 3.1 G/DL (ref 1.5–4.5)
GLUCOSE SERPL-MCNC: 261 MG/DL (ref 65–99)
POTASSIUM SERPL-SCNC: 4.3 MMOL/L (ref 3.5–5.2)
PROT SERPL-MCNC: 7.7 G/DL (ref 6–8.5)
SODIUM SERPL-SCNC: 137 MMOL/L (ref 134–144)

## 2017-09-28 RX ORDER — APIXABAN 5 MG/1
TABLET, FILM COATED ORAL
Qty: 180 TAB | Refills: 3 | Status: SHIPPED | OUTPATIENT
Start: 2017-09-28 | End: 2018-07-21 | Stop reason: SDUPTHER

## 2017-09-28 RX ORDER — METFORMIN HYDROCHLORIDE 500 MG/1
TABLET ORAL
Qty: 270 TAB | Refills: 3 | Status: SHIPPED | OUTPATIENT
Start: 2017-09-28 | End: 2018-02-26 | Stop reason: SINTOL

## 2017-09-29 RX ORDER — LEVOTHYROXINE SODIUM 137 UG/1
137 TABLET ORAL
Qty: 90 TAB | Refills: 4 | Status: SHIPPED | OUTPATIENT
Start: 2017-09-29 | End: 2018-09-10 | Stop reason: SDUPTHER

## 2017-11-14 ENCOUNTER — CLINICAL SUPPORT (OUTPATIENT)
Dept: FAMILY MEDICINE CLINIC | Age: 72
End: 2017-11-14

## 2017-11-14 DIAGNOSIS — I10 ESSENTIAL HYPERTENSION: ICD-10-CM

## 2017-11-14 DIAGNOSIS — E11.9 TYPE 2 DIABETES MELLITUS WITHOUT COMPLICATION, WITHOUT LONG-TERM CURRENT USE OF INSULIN (HCC): ICD-10-CM

## 2017-11-14 NOTE — PATIENT INSTRUCTIONS
If you have any questions regarding [employer], you may call Where I've Been support at [phone].

## 2017-11-14 NOTE — MR AVS SNAPSHOT
Visit Information Date & Time Provider Department Dept. Phone Encounter #  
 11/14/2017  7:35 AM [facility] Alicia 1690 [id number] Your Appointments 11/15/2017 10:30 AM  
ESTABLISHED PATIENT with MD Umair Perez 38 ([facility]) Appt Note: 3 MO FU  
 [address] [address] [phone]  
  
   
 [address] [address] Upcoming Health Maintenance Date Due Hepatitis C Screening 1945 FOOT EXAM Q1 1/5/1955 Influenza Age 5 to Adult 8/1/2017 EYE EXAM RETINAL OR DILATED Q1 8/1/2018* HEMOGLOBIN A1C Q6M 2/9/2018 BREAST CANCER SCRN MAMMOGRAM 4/18/2018 GLAUCOMA SCREENING Q2Y 5/19/2018 LIPID PANEL Q1 8/9/2018 MEDICARE YEARLY EXAM 8/10/2018 MICROALBUMIN Q1 8/15/2018 COLONOSCOPY 11/14/2023 DTaP/Tdap/Td series (2 - Td) 7/5/2027 *Topic was postponed. The date shown is not the original due date. Allergies as of 11/14/2017  Review Complete On: 8/23/2017 By: Nick Morris RN Severity Noted Reaction Type Reactions Penicillins  11/14/2013    Unknown (comments) Current Immunizations  Never Reviewed Name Date Influenza Vaccine 9/30/2015, 10/7/2014 Influenza Vaccine (Quad) PF 9/21/2016 10:59 AM  
 Pneumococcal Conjugate (PCV-13) 2/3/2015 Pneumococcal Vaccine (Unspecified Type) 1/25/2008 Varicella Virus Vaccine 6/25/2011 Not reviewed this visit You Were Diagnosed With   
  
 Codes Comments Essential hypertension     ICD-10-CM: I10 
ICD-9-CM: 401.9 Type 2 diabetes mellitus without complication, without long-term current use of insulin (HCC)     ICD-10-CM: E11.9 ICD-9-CM: 250.00 Vitals OB Status Smoking Status Hysterectomy Never Smoker Preferred Pharmacy Pharmacy Name Phone  [address] [phone] Your Updated Medication List  
  
   
This list is accurate as of: 11/14/17  8:22 AM.  Always use your most recent med list. amLODIPine 10 mg tablet Commonly known as:  Bennett Fanti TAKE 1 TABLET DAILY  
  
 atorvastatin 20 mg tablet Commonly known as:  LIPITOR Take 1 Tab by mouth daily. chlorpheniramine-HYDROcodone 10-8 mg/5 mL suspension Commonly known as:  [facility] Take 5 mL by mouth every twelve (12) hours as needed for Cough. Max Daily Amount: 10 mL. ELIQUIS 5 mg tablet Generic drug:  apixaban TAKE 1 TABLET TWICE A DAY  
  
 hydroCHLOROthiazide 12.5 mg tablet Commonly known as:  HYDRODIURIL Take 1 Tab by mouth daily. levothyroxine 137 mcg tablet Commonly known as:  SYNTHROID Take 137 mcg by mouth Daily (before breakfast). LORazepam 0.5 mg tablet Commonly known as:  ATIVAN Take 1 Tab by mouth every eight (8) hours as needed for Anxiety. Max Daily Amount: 1.5 mg.  
  
 meclizine 25 mg tablet Commonly known as:  ANTIVERT Take  by mouth three (3) times daily as needed. metFORMIN 500 mg tablet Commonly known as:  GLUCOPHAGE  
TAKE 1 TABLET THREE TIMES A DAY WITH MEALS  
  
 * ONE TOUCH TEST  
by Does Not Apply route daily. Indications: One Touch Verioflex Meter * ONETOUCH ULTRA TEST strip Generic drug:  glucose blood VI test strips  
by Does Not Apply route See Admin Instructions. ONETOUCH FINEPOINT LANCETS  
by Does Not Apply route daily. Indications: Verioflex lancets, E. 11.9  
  
 sertraline 50 mg tablet Commonly known as:  ZOLOFT  
TAKE 1 TABLET NIGHTLY AS NEEDED  
  
 therapeutic multivitamin tablet Commonly known as:  [facility] Take 1 Tab by mouth daily. * Notice: This list has 2 medication(s) that are the same as other medications prescribed for you. Read the directions carefully, and ask your doctor or other care provider to review them with you. We Performed the Following CBC WITH AUTOMATED DIFF [84399 CPT(R)] HEMOGLOBIN A1C WITH EAG [38341 CPT(R)] LIPID PANEL [49004 CPT(R)] METABOLIC PANEL, COMPREHENSIVE [53201 CPT(R)] TSH 3RD GENERATION [63438 CPT(R)] Patient Instructions If you have any questions regarding [facility], you may call [facility] support at [phone]. Introducing [facility] SERVICES! Dear Severo Samuels: 
Thank you for requesting a [employer] account. Our records indicate that you already have an active [employer] account. You can access your account anytime at [email or web address] [place] Did you know that you can access your hospital and ER discharge instructions at any time in [employer]? You can also review all of your test results from your hospital stay or ER visit. Additional Information If you have questions, please visit the Frequently Asked Questions section of the [employer] website at [email or web address]/[facility]/. Remember, [employer] is NOT to be used for urgent needs. For medical emergencies, dial 911. Now available from your iPhone and Android! Please provide this summary of care documentation to your next provider. Your primary care clinician is listed as Violeta Heath. If you have any questions after today's visit, please call [phone].

## 2017-11-15 ENCOUNTER — OFFICE VISIT (OUTPATIENT)
Dept: FAMILY MEDICINE CLINIC | Age: 72
End: 2017-11-15

## 2017-11-15 VITALS
DIASTOLIC BLOOD PRESSURE: 66 MMHG | HEART RATE: 80 BPM | HEIGHT: 65 IN | WEIGHT: 210 LBS | SYSTOLIC BLOOD PRESSURE: 144 MMHG | RESPIRATION RATE: 16 BRPM | BODY MASS INDEX: 34.99 KG/M2

## 2017-11-15 DIAGNOSIS — Z11.59 ENCOUNTER FOR HEPATITIS C SCREENING TEST FOR LOW RISK PATIENT: ICD-10-CM

## 2017-11-15 DIAGNOSIS — I10 ESSENTIAL HYPERTENSION: ICD-10-CM

## 2017-11-15 DIAGNOSIS — E11.9 TYPE 2 DIABETES MELLITUS WITHOUT COMPLICATION, WITHOUT LONG-TERM CURRENT USE OF INSULIN (HCC): Primary | ICD-10-CM

## 2017-11-15 DIAGNOSIS — Z23 ENCOUNTER FOR IMMUNIZATION: ICD-10-CM

## 2017-11-15 LAB
ALBUMIN SERPL-MCNC: 4.9 G/DL (ref 3.5–4.8)
ALBUMIN/GLOB SERPL: 1.7 {RATIO} (ref 1.2–2.2)
ALP SERPL-CCNC: 115 IU/L (ref 39–117)
ALT SERPL-CCNC: 20 IU/L (ref 0–32)
AST SERPL-CCNC: 27 IU/L (ref 0–40)
BASOPHILS # BLD AUTO: 0.1 X10E3/UL (ref 0–0.2)
BASOPHILS NFR BLD AUTO: 1 %
BILIRUB SERPL-MCNC: 0.3 MG/DL (ref 0–1.2)
BUN SERPL-MCNC: 18 MG/DL (ref 8–27)
BUN/CREAT SERPL: 20 (ref 12–28)
CALCIUM SERPL-MCNC: 10.1 MG/DL (ref 8.7–10.3)
CHLORIDE SERPL-SCNC: 96 MMOL/L (ref 96–106)
CHOLEST SERPL-MCNC: 224 MG/DL (ref 100–199)
CO2 SERPL-SCNC: 25 MMOL/L (ref 18–29)
CREAT SERPL-MCNC: 0.89 MG/DL (ref 0.57–1)
EOSINOPHIL # BLD AUTO: 0.1 X10E3/UL (ref 0–0.4)
EOSINOPHIL NFR BLD AUTO: 2 %
ERYTHROCYTE [DISTWIDTH] IN BLOOD BY AUTOMATED COUNT: 14.6 % (ref 12.3–15.4)
EST. AVERAGE GLUCOSE BLD GHB EST-MCNC: 200 MG/DL
GFR SERPLBLD CREATININE-BSD FMLA CKD-EPI: 65 ML/MIN/1.73
GFR SERPLBLD CREATININE-BSD FMLA CKD-EPI: 75 ML/MIN/1.73
GLOBULIN SER CALC-MCNC: 2.9 G/DL (ref 1.5–4.5)
GLUCOSE SERPL-MCNC: 189 MG/DL (ref 65–99)
HBA1C MFR BLD: 8.6 % (ref 4.8–5.6)
HCT VFR BLD AUTO: 38.5 % (ref 34–46.6)
HDLC SERPL-MCNC: 58 MG/DL
HGB BLD-MCNC: 12.9 G/DL (ref 11.1–15.9)
IMM GRANULOCYTES # BLD: 0 X10E3/UL (ref 0–0.1)
IMM GRANULOCYTES NFR BLD: 0 %
LDLC SERPL CALC-MCNC: 132 MG/DL (ref 0–99)
LYMPHOCYTES # BLD AUTO: 2.6 X10E3/UL (ref 0.7–3.1)
LYMPHOCYTES NFR BLD AUTO: 41 %
MCH RBC QN AUTO: 27.9 PG (ref 26.6–33)
MCHC RBC AUTO-ENTMCNC: 33.5 G/DL (ref 31.5–35.7)
MCV RBC AUTO: 83 FL (ref 79–97)
MONOCYTES # BLD AUTO: 0.6 X10E3/UL (ref 0.1–0.9)
MONOCYTES NFR BLD AUTO: 9 %
NEUTROPHILS # BLD AUTO: 2.9 X10E3/UL (ref 1.4–7)
NEUTROPHILS NFR BLD AUTO: 47 %
PLATELET # BLD AUTO: 320 X10E3/UL (ref 150–379)
POTASSIUM SERPL-SCNC: 4.4 MMOL/L (ref 3.5–5.2)
PROT SERPL-MCNC: 7.8 G/DL (ref 6–8.5)
RBC # BLD AUTO: 4.63 X10E6/UL (ref 3.77–5.28)
SODIUM SERPL-SCNC: 139 MMOL/L (ref 134–144)
TRIGL SERPL-MCNC: 172 MG/DL (ref 0–149)
TSH SERPL DL<=0.005 MIU/L-ACNC: 2.33 UIU/ML (ref 0.45–4.5)
VLDLC SERPL CALC-MCNC: 34 MG/DL (ref 5–40)
WBC # BLD AUTO: 6.2 X10E3/UL (ref 3.4–10.8)

## 2017-11-15 RX ORDER — ROSUVASTATIN CALCIUM 10 MG/1
10 TABLET, COATED ORAL
Qty: 90 TAB | Refills: 4 | Status: SHIPPED | OUTPATIENT
Start: 2017-11-15 | End: 2018-09-10 | Stop reason: SDUPTHER

## 2017-11-15 NOTE — PATIENT INSTRUCTIONS
Vaccine Information Statement    Influenza (Flu) Vaccine (Inactivated or Recombinant): What you need to know    Many Vaccine Information Statements are available in [place] and other languages. See www.immunize.org/vis  Hojas de Información Sobre Vacunas están disponibles en Español y en muchos otros idiomas. Visite www.immunize.org/vis    1. Why get vaccinated? Influenza (flu) is a contagious disease that spreads around the [place] every year, usually between October and May. Flu is caused by influenza viruses, and is spread mainly by coughing, sneezing, and close contact. Anyone can get flu. Flu strikes suddenly and can last several days. Symptoms vary by age, but can include:   fever/chills   sore throat   muscle aches   fatigue   cough   headache    runny or stuffy nose    Flu can also lead to pneumonia and blood infections, and cause diarrhea and seizures in children. If you have a medical condition, such as heart or lung disease, flu can make it worse. Flu is more dangerous for some people. Infants and young children, people 72years of age and older, pregnant women, and people with certain health conditions or a weakened immune system are at greatest risk. Each year thousands of people in the [facility] die from flu, and many more are hospitalized. Flu vaccine can:   keep you from getting flu,   make flu less severe if you do get it, and   keep you from spreading flu to your family and other people. 2. Inactivated and recombinant flu vaccines    A dose of flu vaccine is recommended every flu season. Children 6 months through 6years of age may need two doses during the same flu season. Everyone else needs only one dose each flu season.        Some inactivated flu vaccines contain a very small amount of a mercury-based preservative called thimerosal. Studies have not shown thimerosal in vaccines to be harmful, but flu vaccines that do not contain thimerosal are available. There is no live flu virus in flu shots. They cannot cause the flu. There are many flu viruses, and they are always changing. Each year a new flu vaccine is made to protect against three or four viruses that are likely to cause disease in the upcoming flu season. But even when the vaccine doesnt exactly match these viruses, it may still provide some protection    Flu vaccine cannot prevent:   flu that is caused by a virus not covered by the vaccine, or   illnesses that look like flu but are not. It takes about 2 weeks for protection to develop after vaccination, and protection lasts through the flu season. 3. Some people should not get this vaccine    Tell the person who is giving you the vaccine:     If you have any severe, life-threatening allergies. If you ever had a life-threatening allergic reaction after a dose of flu vaccine, or have a severe allergy to any part of this vaccine, you may be advised not to get vaccinated. Most, but not all, types of flu vaccine contain a small amount of egg protein.  If you ever had Guillain-Barré Syndrome (also called GBS). Some people with a history of GBS should not get this vaccine. This should be discussed with your doctor.  If you are not feeling well. It is usually okay to get flu vaccine when you have a mild illness, but you might be asked to come back when you feel better. 4. Risks of a vaccine reaction    With any medicine, including vaccines, there is a chance of reactions. These are usually mild and go away on their own, but serious reactions are also possible. Most people who get a flu shot do not have any problems with it.      Minor problems following a flu shot include:    soreness, redness, or swelling where the shot was given     hoarseness   sore, red or itchy eyes   cough   fever   aches   headache   itching   fatigue  If these problems occur, they usually begin soon after the shot and last 1 or 2 days. More serious problems following a flu shot can include the following:     There may be a small increased risk of Guillain-Barré Syndrome (GBS) after inactivated flu vaccine. This risk has been estimated at 1 or 2 additional cases per million people vaccinated. This is much lower than the risk of severe complications from flu, which can be prevented by flu vaccine.  Young children who get the flu shot along with pneumococcal vaccine (PCV13) and/or DTaP vaccine at the same time might be slightly more likely to have a seizure caused by fever. Ask your doctor for more information. Tell your doctor if a child who is getting flu vaccine has ever had a seizure. Problems that could happen after any injected vaccine:      People sometimes faint after a medical procedure, including vaccination. Sitting or lying down for about 15 minutes can help prevent fainting, and injuries caused by a fall. Tell your doctor if you feel dizzy, or have vision changes or ringing in the ears.  Some people get severe pain in the shoulder and have difficulty moving the arm where a shot was given. This happens very rarely.  Any medication can cause a severe allergic reaction. Such reactions from a vaccine are very rare, estimated at about 1 in a million doses, and would happen within a few minutes to a few hours after the vaccination. As with any medicine, there is a very remote chance of a vaccine causing a serious injury or death. The safety of vaccines is always being monitored. For more information, visit: www.cdc.gov/vaccinesafety/    5. What if there is a serious reaction? What should I look for?  Look for anything that concerns you, such as signs of a severe allergic reaction, very high fever, or unusual behavior.     Signs of a severe allergic reaction can include hives, swelling of the face and throat, difficulty breathing, a fast heartbeat, dizziness, and weakness - usually within a few minutes to a few hours after the vaccination. What should I do?  If you think it is a severe allergic reaction or other emergency that cant wait, call 9-1-1 and get the person to the nearest hospital. Otherwise, call your doctor.  Reactions should be reported to the Vaccine Adverse Event Reporting System (VAERS). Your doctor should file this report, or you can do it yourself through  the VAERS web site at www.vaers. [facility].gov, or by calling 1-345.125.6392. VAERS does not give medical advice. 6. The National Vaccine Injury Compensation Program    The [facility] Vaccine Injury Compensation Program (VICP) is a federal program that was created to compensate people who may have been injured by certain vaccines. Persons who believe they may have been injured by a vaccine can learn about the program and about filing a claim by calling 0-326.576.6268 or visiting the [employer] website at www.[facility].gov/vaccinecompensation. There is a time limit to file a claim for compensation. 7. How can I learn more?  Ask your healthcare provider. He or she can give you the vaccine package insert or suggest other sources of information.  Call your local or state health department.  Contact the Centers for Disease Control and Prevention (CDC):  - Call 3-293.169.6018 (1-800-CDC-INFO) or  - Visit CDCs website at www.cdc.gov/flu    Vaccine Information Statement   Inactivated Influenza Vaccine   8/7/2015  [facility] [id number]    Department of Health and Human Services  Centers for Disease Control and Prevention    Office Use Only  If you have any questions regarding [facility], you may call [facility] support at [phone].

## 2017-11-15 NOTE — PROGRESS NOTES
Chief Complaint   Patient presents with    Diabetes         HPI:       is a 67 y.o. female. T2DM. A1c is now < 9 (8.6), SBP at home is < 130. LDL remains > 130 despite atorvastatin. No side effects. Attended the Diabetes forum at the [facility] and is energized and motivated to succeed. Discussed s/s for CAD and she denies these. Attempting to exercise but is limited due to leg muscle pain in her thighs. Motivated however and will continue to work out. She has lost 6 pounds since her last visit. Allergies   Allergen Reactions    Penicillins Unknown (comments)       Current Outpatient Prescriptions   Medication Sig    levothyroxine (SYNTHROID) 137 mcg tablet Take 137 mcg by mouth Daily (before breakfast).  metFORMIN (GLUCOPHAGE) 500 mg tablet TAKE 1 TABLET THREE TIMES A DAY WITH MEALS    ELIQUIS 5 mg tablet TAKE 1 TABLET TWICE A DAY    hydroCHLOROthiazide (HYDRODIURIL) 12.5 mg tablet Take 1 Tab by mouth daily.  LORazepam (ATIVAN) 0.5 mg tablet Take 1 Tab by mouth every eight (8) hours as needed for Anxiety. Max Daily Amount: 1.5 mg.    amLODIPine (NORVASC) 10 mg tablet TAKE 1 TABLET DAILY    sertraline (ZOLOFT) 50 mg tablet TAKE 1 TABLET NIGHTLY AS NEEDED    BLOOD SUGAR DIAGNOSTIC (ONE TOUCH TEST) by Does Not Apply route daily. Indications: One Touch Verioflex Meter    ONETOUCH FINEPOINT LANCETS by Does Not Apply route daily. Indications: Verioflex lancets, E. 11.9    glucose blood VI test strips (ONETOUCH ULTRA TEST) strip by Does Not Apply route See Admin Instructions.  meclizine (ANTIVERT) 25 mg tablet Take  by mouth three (3) times daily as needed.  therapeutic multivitamin (THERAGRAN) tablet Take 1 Tab by mouth daily.  atorvastatin (LIPITOR) 20 mg tablet Take 1 Tab by mouth daily. No current facility-administered medications for this visit.         Past Medical History:   Diagnosis Date    Atrial fibrillation [facility])     Cholecystitis July 6, 2016    Diabetes ([address]     Dizziness     Hyperlipidemia     Hypertension     Hypothyroidism     OA (osteoarthritis) of knee     Pain, wrist     Carpal tunnel?  Unspecified sleep apnea     CPAP mask         ROS:  Denies fever, chills, cough, chest pain, SOB,  nausea, vomiting, or diarrhea. Denies wt loss, wt gain, hemoptysis, hematochezia or melena. Physical Examination:    /66 (BP 1 Location: Left arm, BP Patient Position: Sitting)  Pulse 80  Resp 16  Ht 5' 4.5\" (1.638 m)  Wt 210 lb (95.3 kg)  BMI 35.49 kg/m2    General: Alert and Ox3, Fluent speech  HEENT:  NC/AT, EOMI, OP: clear  Neck:  Supple, no adenopathy, JVD, mass or bruit  Chest:  Clear to Ausculation, without wheezes, rales, rubs or ronchi  Cardiac: RRR with a 2/6 TOM  Abdomen:  +BS, soft, nontender without palpable HSM  Extremities:  No cyanosis, clubbing or edema  Neurologic:  Ambulatory without assist, CN 2-12 grossly intact. Moves all extremities. Skin: no rash  Lymphadenopathy: no cervical or supraclavicular nodes  Diabetic Foot Exam:  Both feet are examined and demonstrate intact DP pulses. There is good capillary refill and sensation is intact. Skin is intact and there are no ulcers or sores. Lab Results   Component Value Date/Time    Hemoglobin A1c 8.6 11/14/2017 07:47 AM     Lab Results   Component Value Date/Time    LDL, calculated 132 11/14/2017 07:47 AM         ASSESSMENT AND PLAN:     1. T2DM:  Moving in the right direction:  Continue diet and exercise  2. SBP is at goal based on validated device that she uses at home  3. LDL remains up:  Stop atorvastatin and begin Rosuvastatin  4. Flu vaccine  5.   RTC in Late Feb    Orders Placed This Encounter    Influenza virus vaccine ([address]) 72 years and older ([address])    [address] fee () for Medicare insured patients       Jessica Olivera MD, Jovanny Blackwell

## 2017-11-15 NOTE — PROGRESS NOTES
Trini Martinez is a 67 y.o. female who presents for routine immunizations. She denies any symptoms , reactions or allergies that would exclude them from being immunized today. Risks and adverse reactions were discussed and the VIS was given to them. All questions were addressed.     Donnell Block RN

## 2017-11-15 NOTE — MR AVS SNAPSHOT
Visit Information Date & Time Provider Department Dept. Phone Encounter #  
 11/15/2017 10:30 AM Leeroy Segundo MD [address] [id number] Upcoming Health Maintenance Date Due Hepatitis C Screening 1945 FOOT EXAM Q1 1/5/1955 Influenza Age 5 to Adult 8/1/2017 EYE EXAM RETINAL OR DILATED Q1 8/1/2018* HEMOGLOBIN A1C Q6M 2/9/2018 BREAST CANCER SCRN MAMMOGRAM 4/18/2018 GLAUCOMA SCREENING Q2Y 5/19/2018 LIPID PANEL Q1 8/9/2018 MEDICARE YEARLY EXAM 8/10/2018 MICROALBUMIN Q1 8/15/2018 COLONOSCOPY 11/14/2023 DTaP/Tdap/Td series (2 - Td) 7/5/2027 *Topic was postponed. The date shown is not the original due date. Allergies as of 11/15/2017  Review Complete On: 11/15/2017 By: Leeroy Segundo MD  
  
 Severity Noted Reaction Type Reactions Penicillins  11/14/2013    Unknown (comments) Current Immunizations  Never Reviewed Name Date Influenza High Dose Vaccine PF  Incomplete Influenza Vaccine 9/30/2015, 10/7/2014 Influenza Vaccine (Quad) PF 9/21/2016 10:59 AM  
 Pneumococcal Conjugate (PCV-13) 2/3/2015 Pneumococcal Vaccine (Unspecified Type) 1/25/2008 Varicella Virus Vaccine 6/25/2011 Not reviewed this visit You Were Diagnosed With   
  
 Codes Comments Type 2 diabetes mellitus without complication, without long-term current use of insulin (HCC)    -  Primary ICD-10-CM: E11.9 ICD-9-CM: 250.00 Encounter for immunization     ICD-10-CM: T38 ICD-9-CM: V03.89 Essential hypertension     ICD-10-CM: I10 
ICD-9-CM: 401.9 Vitals BP Pulse Resp Height(growth percentile) Weight(growth percentile) BMI  
 144/66 (BP 1 Location: Left arm, BP Patient Position: Sitting) 80 16 5' 4.5\" (1.638 m) 210 lb (95.3 kg) 35.49 kg/m2 OB Status Smoking Status Hysterectomy Never Smoker BMI and BSA Data  Body Mass Index Body Surface Area  
 35.49 kg/m 2 2.08 m 2  
  
  
 Preferred Pharmacy Pharmacy Name Phone [address] [facility] [phone] Your Updated Medication List  
  
   
This list is accurate as of: 11/15/17 11:29 AM.  Always use your most recent med list. amLODIPine 10 mg tablet Commonly known as:  Horris Bills TAKE 1 TABLET DAILY  
  
 atorvastatin 20 mg tablet Commonly known as:  LIPITOR Take 1 Tab by mouth daily. ELIQUIS 5 mg tablet Generic drug:  apixaban TAKE 1 TABLET TWICE A DAY  
  
 hydroCHLOROthiazide 12.5 mg tablet Commonly known as:  HYDRODIURIL Take 1 Tab by mouth daily. levothyroxine 137 mcg tablet Commonly known as:  SYNTHROID Take 137 mcg by mouth Daily (before breakfast). LORazepam 0.5 mg tablet Commonly known as:  ATIVAN Take 1 Tab by mouth every eight (8) hours as needed for Anxiety. Max Daily Amount: 1.5 mg.  
  
 meclizine 25 mg tablet Commonly known as:  ANTIVERT Take  by mouth three (3) times daily as needed. metFORMIN 500 mg tablet Commonly known as:  GLUCOPHAGE  
TAKE 1 TABLET THREE TIMES A DAY WITH MEALS  
  
 * ONE TOUCH TEST  
by Does Not Apply route daily. Indications: One Touch Verioflex Meter * ONETOUCH ULTRA TEST strip Generic drug:  glucose blood VI test strips  
by Does Not Apply route See Admin Instructions. ONETOUCH FINEPOINT LANCETS  
by Does Not Apply route daily. Indications: Verioflex lancets, E. 11.9  
  
 sertraline 50 mg tablet Commonly known as:  ZOLOFT  
TAKE 1 TABLET NIGHTLY AS NEEDED  
  
 therapeutic multivitamin tablet Commonly known as:  [facility] Take 1 Tab by mouth daily. * Notice: This list has 2 medication(s) that are the same as other medications prescribed for you. Read the directions carefully, and ask your doctor or other care provider to review them with you. We Performed the Following ADMIN INFLUENZA VIRUS VAC [ [facility]] HM DIABETES FOOT EXAM [HM7 Custom] INFLUENZA VIRUS VACCINE, HIGH DOSE SEASONAL, PRESERVATIVE FREE [05007 CPT(R)] Patient Instructions Vaccine Information Statement Influenza (Flu) Vaccine (Inactivated or Recombinant): What you need to know Many Vaccine Information Statements are available in Monegasque and other languages. See www.immunize.org/vis Hojas de Información Sobre Vacunas están disponibles en Español y en muchos otros idiomas. Visite www.immunize.org/vis 1. Why get vaccinated? Influenza (flu) is a contagious disease that spreads around the [place] every year, usually between October and May. Flu is caused by influenza viruses, and is spread mainly by coughing, sneezing, and close contact. Anyone can get flu. Flu strikes suddenly and can last several days. Symptoms vary by age, but can include: 
 fever/chills  sore throat  muscle aches  fatigue  cough  headache  runny or stuffy nose Flu can also lead to pneumonia and blood infections, and cause diarrhea and seizures in children. If you have a medical condition, such as heart or lung disease, flu can make it worse. Flu is more dangerous for some people. Infants and young children, people 72years of age and older, pregnant women, and people with certain health conditions or a weakened immune system are at greatest risk. Each year thousands of people in the [facility] die from flu, and many more are hospitalized. Flu vaccine can: 
 keep you from getting flu, 
 make flu less severe if you do get it, and 
 keep you from spreading flu to your family and other people. 2. Inactivated and recombinant flu vaccines A dose of flu vaccine is recommended every flu season. Children 6 months through 6years of age may need two doses during the same flu season. Everyone else needs only one dose each flu season.   
 
 
Some inactivated flu vaccines contain a very small amount of a mercury-based preservative called thimerosal. Studies have not shown thimerosal in vaccines to be harmful, but flu vaccines that do not contain thimerosal are available. There is no live flu virus in flu shots. They cannot cause the flu. There are many flu viruses, and they are always changing. Each year a new flu vaccine is made to protect against three or four viruses that are likely to cause disease in the upcoming flu season. But even when the vaccine doesnt exactly match these viruses, it may still provide some protection Flu vaccine cannot prevent: 
 flu that is caused by a virus not covered by the vaccine, or 
 illnesses that look like flu but are not. It takes about 2 weeks for protection to develop after vaccination, and protection lasts through the flu season. 3. Some people should not get this vaccine Tell the person who is giving you the vaccine:  If you have any severe, life-threatening allergies. If you ever had a life-threatening allergic reaction after a dose of flu vaccine, or have a severe allergy to any part of this vaccine, you may be advised not to get vaccinated. Most, but not all, types of flu vaccine contain a small amount of egg protein.  If you ever had Guillain-Barré Syndrome (also called GBS). Some people with a history of GBS should not get this vaccine. This should be discussed with your doctor.  If you are not feeling well. It is usually okay to get flu vaccine when you have a mild illness, but you might be asked to come back when you feel better. 4. Risks of a vaccine reaction With any medicine, including vaccines, there is a chance of reactions. These are usually mild and go away on their own, but serious reactions are also possible. Most people who get a flu shot do not have any problems with it. Minor problems following a flu shot include:  
 soreness, redness, or swelling where the shot was given  hoarseness  sore, red or itchy eyes  cough  fever  aches  headache  itching  fatigue If these problems occur, they usually begin soon after the shot and last 1 or 2 days. More serious problems following a flu shot can include the following:  There may be a small increased risk of Guillain-Barré Syndrome (GBS) after inactivated flu vaccine. This risk has been estimated at 1 or 2 additional cases per million people vaccinated. This is much lower than the risk of severe complications from flu, which can be prevented by flu vaccine.  Young children who get the flu shot along with pneumococcal vaccine (PCV13) and/or DTaP vaccine at the same time might be slightly more likely to have a seizure caused by fever. Ask your doctor for more information. Tell your doctor if a child who is getting flu vaccine has ever had a seizure. Problems that could happen after any injected vaccine:  People sometimes faint after a medical procedure, including vaccination. Sitting or lying down for about 15 minutes can help prevent fainting, and injuries caused by a fall. Tell your doctor if you feel dizzy, or have vision changes or ringing in the ears.  Some people get severe pain in the shoulder and have difficulty moving the arm where a shot was given. This happens very rarely.  Any medication can cause a severe allergic reaction. Such reactions from a vaccine are very rare, estimated at about 1 in a million doses, and would happen within a few minutes to a few hours after the vaccination. As with any medicine, there is a very remote chance of a vaccine causing a serious injury or death. The safety of vaccines is always being monitored. For more information, visit: www.cdc.gov/vaccinesafety/ 
 
5. What if there is a serious reaction? What should I look for?  Look for anything that concerns you, such as signs of a severe allergic reaction, very high fever, or unusual behavior. Signs of a severe allergic reaction can include hives, swelling of the face and throat, difficulty breathing, a fast heartbeat, dizziness, and weakness  usually within a few minutes to a few hours after the vaccination. What should I do?  If you think it is a severe allergic reaction or other emergency that cant wait, call 9-1-1 and get the person to the nearest hospital. Otherwise, call your doctor.  Reactions should be reported to the Vaccine Adverse Event Reporting System (VAERS). Your doctor should file this report, or you can do it yourself through  the VAERS web site at www.vaers. [facility].gov, or by calling 9-571.429.7534. VAERS does not give medical advice. 6. The National Vaccine Injury Compensation Program 
 
The [facility] Vaccine Injury Compensation Program (VICP) is a federal program that was created to compensate people who may have been injured by certain vaccines. Persons who believe they may have been injured by a vaccine can learn about the program and about filing a claim by calling 7-984.544.1957 or visiting the [facility] [address] website at www.[facility].gov/vaccinecompensation. There is a time limit to file a claim for compensation. 7. How can I learn more?  Ask your healthcare provider. He or she can give you the vaccine package insert or suggest other sources of information.  Call your local or state health department.  Contact the Centers for Disease Control and Prevention (CDC): 
- Call 7-212.762.8480 (1-800-CDC-INFO) or 
- Visit CDCs website at www.cdc.gov/flu Vaccine Information Statement Inactivated Influenza Vaccine 8/7/2015 
42 FARHAN Rush [id number] Department of Health and [employer] Centers for Disease Control and Prevention Office Use Only If you have any questions regarding [employer], you may call [employer] support at [phone]. Introducing [facility]! Dear Jignesh Sanchez: 
Thank you for requesting a [employer] account.   Our records indicate that you already have an active [employer] account. You can access your account anytime at [email or web address] Did you know that you can access your hospital and ER discharge instructions at any time in [employer]? You can also review all of your test results from your hospital stay or ER visit. Additional Information If you have questions, please visit the Frequently Asked Questions section of the [employer] website at [email or web address]/. Remember, [employer] is NOT to be used for urgent needs. For medical emergencies, dial 911. Now available from your iPhone and Android! Please provide this summary of care documentation to your next provider. Your primary care clinician is listed as Sadiq Whittington. If you have any questions after today's visit, please call [phone].

## 2017-11-28 DIAGNOSIS — Z12.39 BREAST CANCER SCREENING: Primary | ICD-10-CM

## 2018-02-23 ENCOUNTER — OFFICE VISIT (OUTPATIENT)
Dept: FAMILY MEDICINE CLINIC | Age: 73
End: 2018-02-23

## 2018-02-23 VITALS
OXYGEN SATURATION: 98 % | RESPIRATION RATE: 16 BRPM | HEART RATE: 126 BPM | BODY MASS INDEX: 36.42 KG/M2 | HEIGHT: 65 IN | WEIGHT: 218.6 LBS | DIASTOLIC BLOOD PRESSURE: 80 MMHG | SYSTOLIC BLOOD PRESSURE: 144 MMHG

## 2018-02-23 DIAGNOSIS — I48.20 CHRONIC ATRIAL FIBRILLATION (HCC): Primary | ICD-10-CM

## 2018-02-23 RX ORDER — LOSARTAN POTASSIUM AND HYDROCHLOROTHIAZIDE 25; 100 MG/1; MG/1
1 TABLET ORAL DAILY
COMMUNITY
Start: 2018-02-13 | End: 2019-02-15 | Stop reason: SDUPTHER

## 2018-02-23 RX ORDER — METOPROLOL TARTRATE 50 MG/1
50 TABLET ORAL 2 TIMES DAILY
Qty: 180 TAB | Refills: 4 | Status: SHIPPED | OUTPATIENT
Start: 2018-02-23 | End: 2018-04-11 | Stop reason: SDUPTHER

## 2018-02-23 RX ORDER — AMLODIPINE BESYLATE 5 MG/1
5 TABLET ORAL DAILY
Qty: 90 TAB | Refills: 4
Start: 2018-02-23 | End: 2018-09-04

## 2018-02-23 NOTE — PROGRESS NOTES
Chief Complaint   Patient presents with    Hypertension         HPI:       is a 68 y.o. female. Dee Choudhury had AF and underwent Cryoballoon ablation of her pulmonary veins at [address] [place] [address] with Dr Lobo Vleasco team on October 24, 2015. The results were excellent and she had no inducible arrhythmias. Dee Choudhury was discharged on Apixaban and Dofetilide and the latter was discontinued in Jan 2016. Doing well except with BP. Placed on Losartan HCTZ at [address] [place] [address]. Hustler fine until recently. Palpitations but no chest pain, pressure or tightness. Allergies   Allergen Reactions    Penicillins Unknown (comments)       Current Outpatient Prescriptions   Medication Sig    metoprolol tartrate (LOPRESSOR) 50 mg tablet Take 1 Tab by mouth two (2) times a day.  amLODIPine (NORVASC) 5 mg tablet Take 1 Tab by mouth daily.  losartan-hydroCHLOROthiazide (HYZAAR) 100-25 mg per tablet Take 1 Tab by mouth daily.  rosuvastatin (CRESTOR) 10 mg tablet Take 1 Tab by mouth nightly.  levothyroxine (SYNTHROID) 137 mcg tablet Take 137 mcg by mouth Daily (before breakfast).  metFORMIN (GLUCOPHAGE) 500 mg tablet TAKE 1 TABLET THREE TIMES A DAY WITH MEALS    ELIQUIS 5 mg tablet TAKE 1 TABLET TWICE A DAY    LORazepam (ATIVAN) 0.5 mg tablet Take 1 Tab by mouth every eight (8) hours as needed for Anxiety. Max Daily Amount: 1.5 mg.    sertraline (ZOLOFT) 50 mg tablet TAKE 1 TABLET NIGHTLY AS NEEDED    BLOOD SUGAR DIAGNOSTIC (ONE TOUCH TEST) by Does Not Apply route daily. Indications: One Touch Verioflex Meter    ONETOUCH FINEPOINT LANCETS by Does Not Apply route daily. Indications: Verioflex lancets, E. 11.9    glucose blood VI test strips (ONETOUCH ULTRA TEST) strip by Does Not Apply route See Admin Instructions.  meclizine (ANTIVERT) 25 mg tablet Take  by mouth three (3) times daily as needed.  therapeutic multivitamin (THERAGRAN) tablet Take 1 Tab by mouth daily.      No current facility-administered medications for this visit. Past Medical History:   Diagnosis Date    Atrial fibrillation [facility])     Cholecystitis July 6, 2016    Diabetes [facility])     Dizziness     Hyperlipidemia     Hypertension     Hypothyroidism     OA (osteoarthritis) of knee     Pain, wrist     Carpal tunnel?  Unspecified sleep apnea     CPAP mask         ROS:  Denies fever, chills, cough, chest pain, SOB,  nausea, vomiting, or diarrhea. Denies wt loss, wt gain, hemoptysis, hematochezia or melena. Physical Examination:    /80 (BP 1 Location: Left arm, BP Patient Position: Sitting)  Pulse (!) 126  Resp 16  Ht 5' 4.5\" (1.638 m)  Wt 218 lb 9.6 oz (99.2 kg)  SpO2 98%  BMI 36.94 kg/m2    General: Alert and Ox3, Fluent speech  HEENT:  NC/AT, EOMI, OP: clear  Neck:  Supple, no adenopathy, JVD, mass or bruit  Chest:  Clear to Ausculation, without wheezes, rales, rubs or ronchi  Cardiac: RRR and rapid  Abdomen:  +BS, soft, nontender without palpable HSM  Extremities:  No cyanosis, clubbing or edema  Neurologic:  Ambulatory without assist, CN 2-12 grossly intact. Moves all extremities. Skin: no rash  Lymphadenopathy: no cervical or supraclavicular nodes      ASSESSMENT AND PLAN:     1.  AFlutter with rapid response:  Spoke with Dr Vinita Dick. Will add  Metoprolol 50 mg BID and reduce Amlodipine to 5 mg daily and reeval in 2 weeks or sooner. She is hemodynamically stable at this time. Orders Placed This Encounter    AMB POC EKG ROUTINE W/ 12 LEADS, INTER & REP     Order Specific Question:   Reason for Exam:     Answer:   tachycardia    losartan-hydroCHLOROthiazide (HYZAAR) 100-25 mg per tablet     Sig: Take 1 Tab by mouth daily.  metoprolol tartrate (LOPRESSOR) 50 mg tablet     Sig: Take 1 Tab by mouth two (2) times a day. Dispense:  180 Tab     Refill:  4    amLODIPine (NORVASC) 5 mg tablet     Sig: Take 1 Tab by mouth daily.      Dispense:  90 Tab     Refill:  4       Kevin Lezama MD, [place]

## 2018-02-23 NOTE — MR AVS SNAPSHOT
[address] [address] [phone] Patient: Genaro Hernandez MRN: [id number] YYZ:[id number] Visit Information Date & Time Provider Department Dept. Phone Encounter #  
 2/23/2018 10:30 AM Bisi Flynn MD [address] [id number] Upcoming Health Maintenance Date Due  
 BREAST CANCER SCRN MAMMOGRAM 4/18/2018 EYE EXAM RETINAL OR DILATED Q1 8/1/2018* GLAUCOMA SCREENING Q2Y 5/19/2018 MEDICARE YEARLY EXAM 8/10/2018 MICROALBUMIN Q1 8/15/2018 HEMOGLOBIN A1C Q6M 8/21/2018 FOOT EXAM Q1 11/15/2018 LIPID PANEL Q1 2/21/2019 COLONOSCOPY 11/14/2023 DTaP/Tdap/Td series (2 - Td) 7/5/2027 *Topic was postponed. The date shown is not the original due date. Allergies as of 2/23/2018  Review Complete On: 2/23/2018 By: Jaxson Manzanares RN Severity Noted Reaction Type Reactions Penicillins  11/14/2013    Unknown (comments) Current Immunizations  Never Reviewed Name Date Influenza High Dose Vaccine PF 11/15/2017 11:23 PM  
 Influenza Vaccine 9/30/2015, 10/7/2014 Influenza Vaccine (Quad) PF 9/21/2016 10:59 AM  
 Pneumococcal Conjugate (PCV-13) 2/3/2015 Pneumococcal Vaccine (Unspecified Type) 1/25/2008 Varicella Virus Vaccine 6/25/2011 Not reviewed this visit You Were Diagnosed With   
  
 Codes Comments Chronic atrial fibrillation (HCC)    -  Primary ICD-10-CM: X99.2 ICD-9-CM: 427.31 Vitals BP Pulse Resp Height(growth percentile) Weight(growth percentile) SpO2  
 144/80 (BP 1 Location: Left arm, BP Patient Position: Sitting) (!) 126 16 5' 4.5\" (1.638 m) 218 lb 9.6 oz (99.2 kg) 98% BMI OB Status Smoking Status 36.94 kg/m2 Hysterectomy Never Smoker BMI and BSA Data Body Mass Index Body Surface Area  
 36.94 kg/m 2 2.12 m 2 Preferred Pharmacy Pharmacy Name Phone [address] [facility] [address] [phone] Your Updated Medication List  
  
   
This list is accurate as of 2/23/18 11:15 AM.  Always use your most recent med list. amLODIPine 10 mg tablet Commonly known as:  Arlene René TAKE 1 TABLET DAILY  
  
 ELIQUIS 5 mg tablet Generic drug:  apixaban TAKE 1 TABLET TWICE A DAY  
  
 levothyroxine 137 mcg tablet Commonly known as:  SYNTHROID Take 137 mcg by mouth Daily (before breakfast). LORazepam 0.5 mg tablet Commonly known as:  ATIVAN Take 1 Tab by mouth every eight (8) hours as needed for Anxiety. Max Daily Amount: 1.5 mg.  
  
 losartan-hydroCHLOROthiazide 100-25 mg per tablet Commonly known as:  HYZAAR Take 1 Tab by mouth daily. meclizine 25 mg tablet Commonly known as:  ANTIVERT Take  by mouth three (3) times daily as needed. metFORMIN 500 mg tablet Commonly known as:  GLUCOPHAGE  
TAKE 1 TABLET THREE TIMES A DAY WITH MEALS  
  
 metoprolol tartrate 50 mg tablet Commonly known as:  LOPRESSOR Take 1 Tab by mouth two (2) times a day. * ONE TOUCH TEST  
by Does Not Apply route daily. Indications: One Touch Verioflex Meter * ONETOUCH ULTRA TEST strip Generic drug:  glucose blood VI test strips  
by Does Not Apply route See Admin Instructions. ONETOUCH FINEPOINT LANCETS  
by Does Not Apply route daily. Indications: Verioflex lancets, E. 11.9  
  
 rosuvastatin 10 mg tablet Commonly known as:  CRESTOR Take 1 Tab by mouth nightly. sertraline 50 mg tablet Commonly known as:  ZOLOFT  
TAKE 1 TABLET NIGHTLY AS NEEDED  
  
 therapeutic multivitamin tablet Commonly known as:  [facility] Take 1 Tab by mouth daily. * Notice: This list has 2 medication(s) that are the same as other medications prescribed for you. Read the directions carefully, and ask your doctor or other care provider to review them with you. Prescriptions Sent to Pharmacy Refills  
 metoprolol tartrate (LOPRESSOR) 50 mg tablet 4 Sig: Take 1 Tab by mouth two (2) times a day. Class: Normal  
 Pharmacy: [facility] [address] [facility] [address]  #: [phone] Route: Oral  
  
We Performed the Following AMB POC EKG ROUTINE W/ 12 LEADS, INTER & REP [89068 CPT(R)] Patient Instructions If you have any questions regarding CAPPTURE, you may call CAPPTURE support at [phone]. Introducing [facility]! Dear Jayson Arrieta: 
Thank you for requesting a [employer] account. Our records indicate that you already have an active [employer] account. You can access your account anytime at [email or web address] Did you know that you can access your hospital and ER discharge instructions at any time in [employer]? You can also review all of your test results from your hospital stay or ER visit. Additional Information If you have questions, please visit the Frequently Asked Questions section of the [employer] website at [email or web address] Remember, [employer] is NOT to be used for urgent needs. For medical emergencies, dial 911. Now available from your iPhone and Android! Please provide this summary of care documentation to your next provider. Your primary care clinician is listed as Sandro Sage. If you have any questions after today's visit, please call [phone].

## 2018-02-23 NOTE — PATIENT INSTRUCTIONS
If you have any questions regarding [employer], you may call [employer] support at [phone].

## 2018-02-26 ENCOUNTER — OFFICE VISIT (OUTPATIENT)
Dept: FAMILY MEDICINE CLINIC | Age: 73
End: 2018-02-26

## 2018-02-26 ENCOUNTER — TELEPHONE (OUTPATIENT)
Dept: FAMILY MEDICINE CLINIC | Age: 73
End: 2018-02-26

## 2018-02-26 VITALS
WEIGHT: 212 LBS | TEMPERATURE: 97.6 F | SYSTOLIC BLOOD PRESSURE: 140 MMHG | OXYGEN SATURATION: 97 % | HEIGHT: 64 IN | RESPIRATION RATE: 16 BRPM | DIASTOLIC BLOOD PRESSURE: 80 MMHG | BODY MASS INDEX: 36.19 KG/M2 | HEART RATE: 78 BPM

## 2018-02-26 DIAGNOSIS — E11.9 TYPE 2 DIABETES MELLITUS WITHOUT COMPLICATION, WITHOUT LONG-TERM CURRENT USE OF INSULIN (HCC): ICD-10-CM

## 2018-02-26 DIAGNOSIS — I48.20 CHRONIC ATRIAL FIBRILLATION (HCC): Primary | ICD-10-CM

## 2018-02-26 PROBLEM — E11.21 TYPE 2 DIABETES WITH NEPHROPATHY (HCC): Status: ACTIVE | Noted: 2018-02-26

## 2018-02-26 RX ORDER — METFORMIN HYDROCHLORIDE EXTENDED-RELEASE TABLETS 1000 MG/1
1 TABLET, FILM COATED, EXTENDED RELEASE ORAL DAILY
Qty: 90 TAB | Refills: 4 | Status: SHIPPED | OUTPATIENT
Start: 2018-02-26 | End: 2018-11-23 | Stop reason: DRUGHIGH

## 2018-02-26 NOTE — TELEPHONE ENCOUNTER
Spoke with patient, HR still high but not over 100, tinge of a HA with new medication, had already called and scheduled this afternoon.

## 2018-02-26 NOTE — Clinical Note
Devon Quan- Can we send a copy of today's note and her EKG from today and Friday to Dr Chad Huggins?  Than vita Gonsales

## 2018-02-26 NOTE — MR AVS SNAPSHOT
[address] [facility] 
 
 
 [address] 
[phone] Patient: Mayra Fountain MRN: [id number] YNH:4[birth year] Visit Information Date & Time Provider Department Dept. Phone Encounter #  
 2/26/2018  1:30 PM Jeevan Amor T[place] [phone] [id number] Follow-up Instructions Return if symptoms worsen or fail to improve. Your Appointments 3/12/2018  9:00 AM  
ESTABLISHED PATIENT with Jeevan Amor MD  
[address] ([address]) Appt Note: 1 mo f/u  per Blanca Pearson [address] [facility] [address]  
[address] [facility] [address] [facility] [address] Upcoming Health Maintenance Date Due  
 BREAST CANCER SCRN MAMMOGRAM 4/18/2018 EYE EXAM RETINAL OR DILATED Q1 8/1/2018* GLAUCOMA SCREENING Q2Y 5/19/2018 MEDICARE YEARLY EXAM 8/10/2018 MICROALBUMIN Q1 8/15/2018 HEMOGLOBIN A1C Q6M 8/21/2018 FOOT EXAM Q1 11/15/2018 LIPID PANEL Q1 2/21/2019 COLONOSCOPY 11/14/2023 DTaP/Tdap/Td series (2 - Td) 7/5/2027 *Topic was postponed. The date shown is not the original due date. Allergies as of 2/26/2018  Review Complete On: 2/26/2018 By: Jeevan Amor MD  
  
 Severity Noted Reaction Type Reactions Penicillins  11/14/2013    Unknown (comments) Current Immunizations  Never Reviewed Name Date Influenza High Dose Vaccine PF 11/15/2017 11:23 PM  
 Influenza Vaccine 9/30/2015, 10/7/2014 Influenza Vaccine (Quad) PF 9/21/2016 10:59 AM  
 Pneumococcal Conjugate (PCV-13) 2/3/2015 Pneumococcal Vaccine (Unspecified Type) 1/25/2008 Varicella Virus Vaccine 6/25/2011 Not reviewed this visit You Were Diagnosed With   
  
 Codes Comments Chronic atrial fibrillation (HCC)    -  Primary ICD-10-CM: O57.9 ICD-9-CM: 427.31   
 BMI 36.0-36.9,adult     ICD-10-CM: F23.38 
 ICD-9-CM: V85.36 Vitals BP Pulse Temp Resp Height(growth percentile) Weight(growth percentile) 140/80 (BP 1 Location: Left arm, BP Patient Position: Sitting) 78 97.6 °F (36.4 °C) (Oral) 16 5' 4\" (1.626 m) 212 lb (96.2 kg) SpO2 BMI OB Status Smoking Status 97% 36.39 kg/m2 Hysterectomy Never Smoker BMI and BSA Data Body Mass Index Body Surface Area  
 36.39 kg/m 2 2.08 m 2 Preferred Pharmacy Pharmacy Name Phone [address], [address] [phone] Your Updated Medication List  
  
   
This list is accurate as of 2/26/18  2:11 PM.  Always use your most recent med list. amLODIPine 5 mg tablet Commonly known as:  Larayne Martin Take 1 Tab by mouth daily. ELIQUIS 5 mg tablet Generic drug:  apixaban TAKE 1 TABLET TWICE A DAY  
  
 levothyroxine 137 mcg tablet Commonly known as:  SYNTHROID Take 137 mcg by mouth Daily (before breakfast). LORazepam 0.5 mg tablet Commonly known as:  ATIVAN Take 1 Tab by mouth every eight (8) hours as needed for Anxiety. Max Daily Amount: 1.5 mg.  
  
 losartan-hydroCHLOROthiazide 100-25 mg per tablet Commonly known as:  HYZAAR Take 1 Tab by mouth daily. meclizine 25 mg tablet Commonly known as:  ANTIVERT Take  by mouth three (3) times daily as needed. metoprolol tartrate 50 mg tablet Commonly known as:  LOPRESSOR Take 1 Tab by mouth two (2) times a day. * ONE TOUCH TEST  
by Does Not Apply route daily. Indications: One Touch Verioflex Meter * ONETOUCH ULTRA TEST strip Generic drug:  glucose blood VI test strips  
by Does Not Apply route See Admin Instructions. ONETOUCH FINEPOINT LANCETS  
by Does Not Apply route daily. Indications: Verioflex lancets, E. 11.9  
  
 rosuvastatin 10 mg tablet Commonly known as:  CRESTOR Take 1 Tab by mouth nightly. sertraline 50 mg tablet Commonly known as:  ZOLOFT  
 TAKE 1 TABLET NIGHTLY AS NEEDED  
  
 therapeutic multivitamin tablet Commonly known as:  [facility] Take 1 Tab by mouth daily. * Notice: This list has 2 medication(s) that are the same as other medications prescribed for you. Read the directions carefully, and ask your doctor or other care provider to review them with you. We Performed the Following AMB POC EKG ROUTINE W/ 12 LEADS, INTER & REP [20888 CPT(R)] Follow-up Instructions Return if symptoms worsen or fail to improve. Introducing [facility]! Dear Bala Dela Cruz: 
Thank you for requesting a WebChalet account. Our records indicate that you already have an active WebChalet account. You can access your account anytime at [email or web address]. [employer] Did you know that you can access your hospital and ER discharge instructions at any time in WebChalet? You can also review all of your test results from your hospital stay or ER visit. Additional Information If you have questions, please visit the Frequently Asked Questions section of the WebChalet website at [email or web address] Remember, WebChalet is NOT to be used for urgent needs. For medical emergencies, dial 911. Now available from your iPhone and Android! Please provide this summary of care documentation to your next provider. Your primary care clinician is listed as Lizette Knowles. If you have any questions after today's visit, please call [phone].

## 2018-02-26 NOTE — PROGRESS NOTES
Chief Complaint   Patient presents with    Palpitations     follow up         HPI:       is a 68 y.o. female. Diana Bone had AF and underwent Cryoballoon ablation of her pulmonary veins at [facility] with Dr Dola Spatz team on October 24, 2015. The results were excellent and she had no inducible arrhythmias.  Eden was discharged on Apixaban and Dofetilide and the latter was discontinued in Jan 2016. Doing well except with BP. Placed on Losartan HCTZ at [facility]. Gilberts fine until recently. Seen in the office 3 days ago and was noted to have a rapid ventricular response and placed on Metoprolol. Rate is now controlled but she is complaining of headache. Having trouble with Metformin. Her pharmacy has agreed to change to the ER formulation which should help. Allergies   Allergen Reactions    Penicillins Unknown (comments)       Current Outpatient Prescriptions   Medication Sig    losartan-hydroCHLOROthiazide (HYZAAR) 100-25 mg per tablet Take 1 Tab by mouth daily.  metoprolol tartrate (LOPRESSOR) 50 mg tablet Take 1 Tab by mouth two (2) times a day.  amLODIPine (NORVASC) 5 mg tablet Take 1 Tab by mouth daily.  rosuvastatin (CRESTOR) 10 mg tablet Take 1 Tab by mouth nightly.  levothyroxine (SYNTHROID) 137 mcg tablet Take 137 mcg by mouth Daily (before breakfast).  metFORMIN (GLUCOPHAGE) 500 mg tablet TAKE 1 TABLET THREE TIMES A DAY WITH MEALS    ELIQUIS 5 mg tablet TAKE 1 TABLET TWICE A DAY    LORazepam (ATIVAN) 0.5 mg tablet Take 1 Tab by mouth every eight (8) hours as needed for Anxiety. Max Daily Amount: 1.5 mg.    sertraline (ZOLOFT) 50 mg tablet TAKE 1 TABLET NIGHTLY AS NEEDED    BLOOD SUGAR DIAGNOSTIC (ONE TOUCH TEST) by Does Not Apply route daily. Indications: One Touch Verioflex Meter    ONETOUCH FINEPOINT LANCETS by Does Not Apply route daily.  Indications: Verioflex lancets, E. 11.9    glucose blood VI test strips (ONETOUCH ULTRA TEST) strip by Does Not Apply route See Admin Instructions.  meclizine (ANTIVERT) 25 mg tablet Take  by mouth three (3) times daily as needed.  therapeutic multivitamin (THERAGRAN) tablet Take 1 Tab by mouth daily. No current facility-administered medications for this visit. Past Medical History:   Diagnosis Date    Atrial fibrillation [facility])     Cholecystitis July 6, 2016    Diabetes [facility])     Dizziness     Hyperlipidemia     Hypertension     Hypothyroidism     OA (osteoarthritis) of knee     Pain, wrist     Carpal tunnel?  Unspecified sleep apnea     CPAP mask         ROS:  Denies fever, chills, cough, chest pain, SOB,  nausea, vomiting, or diarrhea. Denies wt loss, wt gain, hemoptysis, hematochezia or melena. Physical Examination:    /80 (BP 1 Location: Left arm, BP Patient Position: Sitting)  Pulse 78  Temp 97.6 °F (36.4 °C) (Oral)   Resp 16  Ht 5' 4\" (1.626 m)  Wt 212 lb (96.2 kg)  SpO2 97%  BMI 36.39 kg/m2    General: Alert and Ox3, Fluent speech  HEENT:  NC/AT, EOMI, OP: clear  Neck:  Supple, no adenopathy, JVD, mass or bruit  Chest:  Clear to Ausculation, without wheezes, rales, rubs or ronchi  Cardiac: IRRR  Abdomen:  +BS, soft, nontender without palpable HSM  Extremities:  No cyanosis, clubbing or edema  Neurologic:  Ambulatory without assist, CN 2-12 grossly intact. Moves all extremities. Skin: no rash  Lymphadenopathy: no cervical or supraclavicular nodes      ASSESSMENT AND PLAN:     1.  AF:  Rate is controlled and she is anticoagulated. Her headache may be from her A Flutter. Will ask that she follow up with Dr Cipriano Tyler in the near future as she may require med adjustment or cardioversion. 2.  BMI 36: education  3.   T2DM:  Convert to Metformin ER 1000 mg daily    Orders Placed This Encounter    AMB POC EKG ROUTINE W/ 12 LEADS, INTER & REP     Order Specific Question:   Reason for Exam:     Answer:   atrial fib       Seun Mendieta MD, [address]

## 2018-03-22 ENCOUNTER — TELEPHONE (OUTPATIENT)
Dept: FAMILY MEDICINE CLINIC | Age: 73
End: 2018-03-22

## 2018-03-22 RX ORDER — LANCETS 33 GAUGE
EACH MISCELLANEOUS
COMMUNITY
End: 2018-03-22 | Stop reason: SDUPTHER

## 2018-03-22 RX ORDER — LANCETS 33 GAUGE
EACH MISCELLANEOUS DAILY
Qty: 100 LANCET | Refills: 4 | Status: SHIPPED | OUTPATIENT
Start: 2018-03-22 | End: 2020-04-03

## 2018-03-28 DIAGNOSIS — Z12.39 BREAST CANCER SCREENING: ICD-10-CM

## 2018-04-11 RX ORDER — METOPROLOL TARTRATE 50 MG/1
50 TABLET ORAL 2 TIMES DAILY
Qty: 180 TAB | Refills: 4 | Status: SHIPPED | OUTPATIENT
Start: 2018-04-11 | End: 2019-05-06 | Stop reason: SDUPTHER

## 2018-05-25 ENCOUNTER — OFFICE VISIT (OUTPATIENT)
Dept: FAMILY MEDICINE CLINIC | Age: 73
End: 2018-05-25

## 2018-05-25 VITALS
BODY MASS INDEX: 34.45 KG/M2 | RESPIRATION RATE: 18 BRPM | OXYGEN SATURATION: 97 % | DIASTOLIC BLOOD PRESSURE: 62 MMHG | HEIGHT: 65 IN | SYSTOLIC BLOOD PRESSURE: 140 MMHG | WEIGHT: 206.8 LBS | HEART RATE: 94 BPM | TEMPERATURE: 98.7 F

## 2018-05-25 DIAGNOSIS — J06.9 VIRAL URI WITH COUGH: Primary | ICD-10-CM

## 2018-05-25 NOTE — PROGRESS NOTES
Chief Complaint   Patient presents with    Cold Symptoms     headache, fever. chills, and bad cough for the last 2 days. HPI:       is a 68 y.o. female. New Issues:  She has been feeling ill for the past 2 days. Symptoms include HA, fever, chills and a bad cough. Has not been sleeping. Tried NyQuil and Flonase. Allergies   Allergen Reactions    Penicillins Unknown (comments)       Current Outpatient Prescriptions   Medication Sig    metoprolol tartrate (LOPRESSOR) 50 mg tablet Take 1 Tab by mouth two (2) times a day.  glucose blood VI test strips (ONETOUCH ULTRA TEST) strip by Does Not Apply route daily. Use to test glucose daily E 11.9    lancets (ONE TOUCH DELICA) 33 gauge misc by Does Not Apply route daily. E11.9    metFORMIN ER 1,000 mg tr24 Take 1 Tab by mouth daily.  losartan-hydroCHLOROthiazide (HYZAAR) 100-25 mg per tablet Take 1 Tab by mouth daily.  amLODIPine (NORVASC) 5 mg tablet Take 1 Tab by mouth daily.  rosuvastatin (CRESTOR) 10 mg tablet Take 1 Tab by mouth nightly.  levothyroxine (SYNTHROID) 137 mcg tablet Take 137 mcg by mouth Daily (before breakfast).  ELIQUIS 5 mg tablet TAKE 1 TABLET TWICE A DAY    LORazepam (ATIVAN) 0.5 mg tablet Take 1 Tab by mouth every eight (8) hours as needed for Anxiety. Max Daily Amount: 1.5 mg.    sertraline (ZOLOFT) 50 mg tablet TAKE 1 TABLET NIGHTLY AS NEEDED    BLOOD SUGAR DIAGNOSTIC (ONE TOUCH TEST) by Does Not Apply route daily. Indications: One Touch Verioflex Meter    meclizine (ANTIVERT) 25 mg tablet Take  by mouth three (3) times daily as needed.  therapeutic multivitamin (THERAGRAN) tablet Take 1 Tab by mouth daily. No current facility-administered medications for this visit.         Past Medical History:   Diagnosis Date    Atrial fibrillation [facility])     Cholecystitis July 6, 2016    Diabetes [facility])     Dizziness     Hyperlipidemia     Hypertension     Hypothyroidism     OA (osteoarthritis) of knee     Pain, wrist     Carpal tunnel?  Unspecified sleep apnea     CPAP mask       Past Surgical History:   Procedure Laterality Date    HX AFIB ABLATION      HX APPENDECTOMY      HX BREAST LUMPECTOMY  1971    Right breast     HX CHOLECYSTECTOMY  2016    HX HYSTERECTOMY      HX TONSIL AND ADENOIDECTOMY      WA COLONOSCOPY FLX DX W/COLLJ SPEC WHEN PFRMD  11/14/2013            Social History     Social History    Marital status:      Spouse name: N/A    Number of children: 2    Years of education: N/A     Occupational History    Facilitator Retired     Social History Main Topics    Smoking status: Never Smoker    Smokeless tobacco: Never Used    Alcohol use No    Drug use: No    Sexual activity: Not Asked     Other Topics Concern     Service No    Blood Transfusions No    Caffeine Concern No    Occupational Exposure No    Hobby Hazards No    Sleep Concern No    Stress Concern No    Weight Concern Yes    Special Diet Yes     Diabetic    Back Care No    Exercise No    Bike Helmet No    Seat Belt Yes    Self-Exams Yes     Social History Narrative       Family History   Problem Relation Age of Onset    Cancer Father      lung    Diabetes Mother        Above history reviewed. ROS:  Denies fever, chills, cough, chest pain, SOB,  nausea, vomiting, or diarrhea. Denies wt loss, wt gain, hemoptysis, hematochezia or melena. Physical Examination:    /62 (BP 1 Location: Left arm, BP Patient Position: Sitting)  Pulse 94  Temp 98.7 °F (37.1 °C) (Oral)   Resp 18  Ht 5' 5\" (1.651 m)  Wt 206 lb 12.8 oz (93.8 kg)  SpO2 97%  BMI 34.41 kg/m2    General: Alert and Ox3, Fluent speech  HEENT:  PERRLA, EOM intact, TMs, turbinates, pharynx normal.  No thyromegaly. No cervical adenopathy.   Neck:  Supple, no adenopathy, JVD, mass or bruit  Chest:  Clear to Ausculation, without wheezes, rales, rubs or ronchi  Cardiac: IRRR  Extremities:  No cyanosis, clubbing or edema  Neurologic:  Ambulatory without assist, CN 2-12 grossly intact. Moves all extremities. Skin: no rash  Lymphadenopathy: no cervical or supraclavicular nodes    ASSESSMENT AND PLAN:     1. Viral URI with cough  Symptoms are viral. Discussed recommendation to avoid antibiotic. Reviewed self care measures:  Fluids  Nasal Saline  Humidification + menthol petroleum (Vicks.)  Postural drainage  NSAID of choice PRN  Avoid decongestants, too drying and difficult to clear respiratory secretions.        RTC PRN    Shona Logan NP

## 2018-05-25 NOTE — MR AVS SNAPSHOT
[address] [address] [phone] Patient: Sung Pendleton MRN: [id number] QZD:3/[birth year]/5560 Visit Information Date & Time Provider Department Dept. Phone Encounter #  
 5/25/2018  4:40 PM Fran Hammonds NP Oscar Dailey [id number] Follow-up Instructions Return if symptoms worsen or fail to improve. Follow-up and Disposition History Your Appointments 5/25/2018  4:40 PM  
ESTABLISHED PATIENT with CHERRY Buenrostro [address] ([facility]) Appt Note: HEAD COLD  HEADACHE  COUGH  (BESSLER PT)****  
 [address] [place] [address],[address] [address] [phone]  
  
   
 [address] [place] [address],[address] [address]  
  
    
 8/24/2018  7:30 AM  
ESTABLISHED PATIENT with MD Umair Bonilla 38 ([facility]) Appt Note: 6 mo f/u  
 [address] [place] [address],[address] [address] [phone]  
  
   
 [address] [place] [address],[address] [address] Upcoming Health Maintenance Date Due  
 GLAUCOMA SCREENING Q2Y 5/19/2018 EYE EXAM RETINAL OR DILATED Q1 8/1/2018* Influenza Age 5 to Adult 8/1/2018 MEDICARE YEARLY EXAM 8/10/2018 MICROALBUMIN Q1 8/15/2018 HEMOGLOBIN A1C Q6M 8/21/2018 FOOT EXAM Q1 11/15/2018 LIPID PANEL Q1 2/21/2019 BREAST CANCER SCRN MAMMOGRAM 1/29/2020 COLONOSCOPY 11/14/2023 DTaP/Tdap/Td series (2 - Td) 7/5/2027 *Topic was postponed. The date shown is not the original due date. Allergies as of 5/25/2018  Review Complete On: 5/25/2018 By: Fran Hammonds NP Severity Noted Reaction Type Reactions Penicillins  11/14/2013    Unknown (comments) Current Immunizations  Never Reviewed Name Date Influenza High Dose Vaccine PF 11/15/2017 11:23 PM  
 Influenza Vaccine 9/30/2015, 10/7/2014 Influenza Vaccine (Quad) PF 9/21/2016 10:59 AM  
 Pneumococcal Conjugate (PCV-13) 2/3/2015 Pneumococcal Vaccine (Unspecified Type) 1/25/2008 Varicella Virus Vaccine 6/25/2011 Not reviewed this visit You Were Diagnosed With   
  
 Codes Comments Viral URI with cough    -  Primary ICD-10-CM: J06.9, B97.89 ICD-9-CM: 465.9 Vitals BP Pulse Temp Resp Height(growth percentile) Weight(growth percentile) 140/62 (BP 1 Location: Left arm, BP Patient Position: Sitting) 94 98.7 °F (37.1 °C) (Oral) 18 5' 5\" (1.651 m) 206 lb 12.8 oz (93.8 kg) SpO2 BMI OB Status Smoking Status 97% 34.41 kg/m2 Hysterectomy Never Smoker Vitals History BMI and BSA Data Body Mass Index Body Surface Area 34.41 kg/m 2 2.07 m 2 Preferred Pharmacy Pharmacy Name Phone 100 [facility]alea Dailey [facility] [phone] Your Updated Medication List  
  
   
This list is accurate as of 5/25/18  3:54 PM.  Always use your most recent med list. amLODIPine 5 mg tablet Commonly known as:  Lexii Wendy Take 1 Tab by mouth daily. ELIQUIS 5 mg tablet Generic drug:  apixaban TAKE 1 TABLET TWICE A DAY  
  
 lancets 33 gauge Misc Commonly known as: One Touch Delica  
by Does Not Apply route daily. E11.9  
  
 levothyroxine 137 mcg tablet Commonly known as:  SYNTHROID Take 137 mcg by mouth Daily (before breakfast). LORazepam 0.5 mg tablet Commonly known as:  ATIVAN Take 1 Tab by mouth every eight (8) hours as needed for Anxiety. Max Daily Amount: 1.5 mg.  
  
 losartan-hydroCHLOROthiazide 100-25 mg per tablet Commonly known as:  HYZAAR Take 1 Tab by mouth daily. meclizine 25 mg tablet Commonly known as:  ANTIVERT Take  by mouth three (3) times daily as needed. metFORMIN ER 1,000 mg Tr24 Take 1 Tab by mouth daily. metoprolol tartrate 50 mg tablet Commonly known as:  LOPRESSOR Take 1 Tab by mouth two (2) times a day.   
  
 * ONE TOUCH TEST  
 by Does Not Apply route daily. Indications: One Touch Verioflex Meter * glucose blood VI test strips strip Commonly known as:  ONETOUCH ULTRA TEST  
by Does Not Apply route daily. Use to test glucose daily E 11.9  
  
 rosuvastatin 10 mg tablet Commonly known as:  CRESTOR Take 1 Tab by mouth nightly. sertraline 50 mg tablet Commonly known as:  ZOLOFT  
TAKE 1 TABLET NIGHTLY AS NEEDED  
  
 therapeutic multivitamin tablet Commonly known as:  [facility] Take 1 Tab by mouth daily. * Notice: This list has 2 medication(s) that are the same as other medications prescribed for you. Read the directions carefully, and ask your doctor or other care provider to review them with you. Follow-up Instructions Return if symptoms worsen or fail to improve. Patient Instructions If you have any questions regarding [employer], you may call [employer] support at [phone]. Introducing [facility] SERVICES! Dear Alfredo Mckeon: 
Thank you for requesting a TaKaDu account. Our records indicate that you already have an active [employer] account. You can access your account anytime at [email or web address]. [employer] Did you know that you can access your hospital and ER discharge instructions at any time in [employer]KaDu? You can also review all of your test results from your hospital stay or ER visit. Additional Information If you have questions, please visit the Frequently Asked Questions section of the [employer] website at [email or web address]. [employer]/. Remember, TaKaDu is NOT to be used for urgent needs. For medical emergencies, dial 911. Now available from your iPhone and Android! Please provide this summary of care documentation to your next provider. Your primary care clinician is listed as Rakel Duffy. If you have any questions after today's visit, please call [phone].

## 2018-07-21 RX ORDER — APIXABAN 5 MG/1
TABLET, FILM COATED ORAL
Qty: 180 TAB | Refills: 3 | Status: SHIPPED | OUTPATIENT
Start: 2018-07-21 | End: 2018-11-23 | Stop reason: ALTCHOICE

## 2018-07-21 RX ORDER — AMLODIPINE BESYLATE 10 MG/1
TABLET ORAL
Qty: 90 TAB | Refills: 4 | Status: SHIPPED | OUTPATIENT
Start: 2018-07-21 | End: 2018-11-23 | Stop reason: DRUGHIGH

## 2018-08-22 ENCOUNTER — LAB ONLY (OUTPATIENT)
Dept: FAMILY MEDICINE CLINIC | Age: 73
End: 2018-08-22

## 2018-08-22 DIAGNOSIS — E78.5 HYPERLIPIDEMIA, UNSPECIFIED HYPERLIPIDEMIA TYPE: ICD-10-CM

## 2018-08-22 DIAGNOSIS — I10 ESSENTIAL HYPERTENSION: Primary | ICD-10-CM

## 2018-08-22 NOTE — MR AVS SNAPSHOT
303 [facility] 
 
 
 [address] [id number] [facility],[address] [address] [phone] Patient: Rocco Driscoll MRN: [id number] QQE:9/5/0349 Visit Information Date & Time Provider Department Dept. Phone Encounter #  
 8/22/2018  9:30 AM [facility] NURSE Oscar Dailey [id number] Your Appointments 8/24/2018  7:30 AM  
ESTABLISHED PATIENT with MD Umair Xiong ([facility]) Appt Note: 6 mo f/u  
 [address] [address] [phone]  
  
   
 [address] [address] Upcoming Health Maintenance Date Due  
 EYE EXAM RETINAL OR DILATED Q1 5/19/2017 GLAUCOMA SCREENING Q2Y 5/19/2018 Influenza Age 5 to Adult 8/1/2018 MEDICARE YEARLY EXAM 8/10/2018 MICROALBUMIN Q1 8/15/2018 HEMOGLOBIN A1C Q6M 8/21/2018 FOOT EXAM Q1 11/15/2018 LIPID PANEL Q1 2/21/2019 BREAST CANCER SCRN MAMMOGRAM 1/29/2020 COLONOSCOPY 11/14/2023 DTaP/Tdap/Td series (2 - Td) 7/5/2027 Allergies as of 8/22/2018  Review Complete On: 5/25/2018 By: Holly Delatorre NP Severity Noted Reaction Type Reactions Penicillins  11/14/2013    Unknown (comments) Current Immunizations  Never Reviewed Name Date Influenza High Dose Vaccine PF 11/15/2017 11:23 PM  
 Influenza Vaccine 9/30/2015, 10/7/2014 Influenza Vaccine (Quad) PF 9/21/2016 10:59 AM  
 Pneumococcal Conjugate (PCV-13) 2/3/2015 Pneumococcal Vaccine (Unspecified Type) 1/25/2008 Varicella Virus Vaccine 6/25/2011 Not reviewed this visit You Were Diagnosed With   
  
 Codes Comments Essential hypertension    -  Primary ICD-10-CM: I10 
ICD-9-CM: 401.9 Hyperlipidemia, unspecified hyperlipidemia type     ICD-10-CM: E78.5 ICD-9-CM: 272.4 Vitals OB Status Smoking Status Hysterectomy Never Smoker Preferred Pharmacy Pharmacy Name Phone [address] [phone] Your Updated Medication List  
  
   
This list is accurate as of 8/22/18  9:55 AM.  Always use your most recent med list.  
  
  
  
  
 * amLODIPine 5 mg tablet Commonly known as:  Millard Royals Take 1 Tab by mouth daily. * amLODIPine 10 mg tablet Commonly known as:  Millard Royals TAKE 1 TABLET DAILY  
  
 ELIQUIS 5 mg tablet Generic drug:  apixaban TAKE 1 TABLET TWICE A DAY  
  
 lancets 33 gauge Misc Commonly known as: One Touch Delica  
by Does Not Apply route daily. E11.9  
  
 levothyroxine 137 mcg tablet Commonly known as:  SYNTHROID Take 137 mcg by mouth Daily (before breakfast). LORazepam 0.5 mg tablet Commonly known as:  ATIVAN Take 1 Tab by mouth every eight (8) hours as needed for Anxiety. Max Daily Amount: 1.5 mg.  
  
 losartan-hydroCHLOROthiazide 100-25 mg per tablet Commonly known as:  HYZAAR Take 1 Tab by mouth daily. meclizine 25 mg tablet Commonly known as:  ANTIVERT Take  by mouth three (3) times daily as needed. metFORMIN ER 1,000 mg Tr24 Take 1 Tab by mouth daily. metoprolol tartrate 50 mg tablet Commonly known as:  LOPRESSOR Take 1 Tab by mouth two (2) times a day. * ONE TOUCH TEST  
by Does Not Apply route daily. Indications: One Touch Verioflex Meter * glucose blood VI test strips strip Commonly known as:  ONETOUCH ULTRA TEST  
by Does Not Apply route daily. Use to test glucose daily E 11.9  
  
 rosuvastatin 10 mg tablet Commonly known as:  CRESTOR Take 1 Tab by mouth nightly. sertraline 50 mg tablet Commonly known as:  ZOLOFT  
TAKE 1 TABLET NIGHTLY AS NEEDED  
  
 therapeutic multivitamin tablet Commonly known as:  [facility] Take 1 Tab by mouth daily. * Notice: This list has 4 medication(s) that are the same as other medications prescribed for you.  Read the directions carefully, and ask your doctor or other care provider to review them with you. We Performed the Following CBC WITH AUTOMATED DIFF [46264 CPT(R)] LIPID PANEL [10774 CPT(R)] METABOLIC PANEL, COMPREHENSIVE [20704 CPT(R)] Patient Instructions If you have any questions regarding [employer], you may call [employer] support at [phone]. Introducing [facility] & [facility] SERVICES! Dear Frederick Weathers: 
Thank you for requesting a Circle[employer] account. Our records indicate that you already have an active Circle[employer] account. You can access your account anytime at [email or web address]. [employer] Did you know that you can access your hospital and ER discharge instructions at any time in Circle[employer]? You can also review all of your test results from your hospital stay or ER visit. Additional Information If you have questions, please visit the Frequently Asked Questions section of the Circlefive website at [email or web address] Remember, Circlefive is NOT to be used for urgent needs. For medical emergencies, dial 911. Now available from your iPhone and Android! Please provide this summary of care documentation to your next provider. Your primary care clinician is listed as Sue Bacon. If you have any questions after today's visit, please call [phone].

## 2018-08-22 NOTE — PATIENT INSTRUCTIONS
If you have any questions regarding [email or web address], you may call [employer] support at [phone].

## 2018-08-23 LAB
ALBUMIN SERPL-MCNC: 4.6 G/DL (ref 3.5–4.8)
ALBUMIN/GLOB SERPL: 1.6 {RATIO} (ref 1.2–2.2)
ALP SERPL-CCNC: 90 IU/L (ref 39–117)
ALT SERPL-CCNC: 15 IU/L (ref 0–32)
AST SERPL-CCNC: 18 IU/L (ref 0–40)
BASOPHILS # BLD AUTO: 0.1 X10E3/UL (ref 0–0.2)
BASOPHILS NFR BLD AUTO: 1 %
BILIRUB SERPL-MCNC: 0.3 MG/DL (ref 0–1.2)
BUN SERPL-MCNC: 19 MG/DL (ref 8–27)
BUN/CREAT SERPL: 21 (ref 12–28)
CALCIUM SERPL-MCNC: 9.6 MG/DL (ref 8.7–10.3)
CHLORIDE SERPL-SCNC: 96 MMOL/L (ref 96–106)
CHOLEST SERPL-MCNC: 196 MG/DL (ref 100–199)
CO2 SERPL-SCNC: 24 MMOL/L (ref 20–29)
CREAT SERPL-MCNC: 0.9 MG/DL (ref 0.57–1)
EOSINOPHIL # BLD AUTO: 0.2 X10E3/UL (ref 0–0.4)
EOSINOPHIL NFR BLD AUTO: 3 %
ERYTHROCYTE [DISTWIDTH] IN BLOOD BY AUTOMATED COUNT: 13.9 % (ref 12.3–15.4)
GLOBULIN SER CALC-MCNC: 2.8 G/DL (ref 1.5–4.5)
GLUCOSE SERPL-MCNC: 168 MG/DL (ref 65–99)
HCT VFR BLD AUTO: 39.4 % (ref 34–46.6)
HDLC SERPL-MCNC: 66 MG/DL
HGB BLD-MCNC: 13 G/DL (ref 11.1–15.9)
IMM GRANULOCYTES # BLD: 0 X10E3/UL (ref 0–0.1)
IMM GRANULOCYTES NFR BLD: 0 %
LDLC SERPL CALC-MCNC: 103 MG/DL (ref 0–99)
LYMPHOCYTES # BLD AUTO: 2.9 X10E3/UL (ref 0.7–3.1)
LYMPHOCYTES NFR BLD AUTO: 37 %
MCH RBC QN AUTO: 29.5 PG (ref 26.6–33)
MCHC RBC AUTO-ENTMCNC: 33 G/DL (ref 31.5–35.7)
MCV RBC AUTO: 90 FL (ref 79–97)
MONOCYTES # BLD AUTO: 0.7 X10E3/UL (ref 0.1–0.9)
MONOCYTES NFR BLD AUTO: 8 %
NEUTROPHILS # BLD AUTO: 4.1 X10E3/UL (ref 1.4–7)
NEUTROPHILS NFR BLD AUTO: 51 %
PLATELET # BLD AUTO: 308 X10E3/UL (ref 150–379)
POTASSIUM SERPL-SCNC: 4.4 MMOL/L (ref 3.5–5.2)
PROT SERPL-MCNC: 7.4 G/DL (ref 6–8.5)
RBC # BLD AUTO: 4.4 X10E6/UL (ref 3.77–5.28)
SODIUM SERPL-SCNC: 139 MMOL/L (ref 134–144)
TRIGL SERPL-MCNC: 133 MG/DL (ref 0–149)
VLDLC SERPL CALC-MCNC: 27 MG/DL (ref 5–40)
WBC # BLD AUTO: 8 X10E3/UL (ref 3.4–10.8)

## 2018-08-28 DIAGNOSIS — E11.21 TYPE 2 DIABETES WITH NEPHROPATHY (HCC): Primary | ICD-10-CM

## 2018-08-30 ENCOUNTER — LAB ONLY (OUTPATIENT)
Dept: FAMILY MEDICINE CLINIC | Age: 73
End: 2018-08-30

## 2018-08-30 DIAGNOSIS — E11.21 TYPE 2 DIABETES WITH NEPHROPATHY (HCC): ICD-10-CM

## 2018-08-30 NOTE — MR AVS SNAPSHOT
[address] [facility] 
 
 
 [address] [address] [phone] Patient: Hetal Mayo MRN: [id number] Visit Information Date & Time Provider Department Dept. Phone Encounter #  
 8/30/2018  2:15 PM Brad 32 [id number] Your Appointments 9/4/2018  8:30 AM  
ESTABLISHED PATIENT with Momo Aguilar MD  
[facility]) Appt Note: 6 mo f/u rescheduled from 8/24/18 dr meeting 601 [facility] Dennis [place] [phone] [phone]  
  
   
 [place] [phone]  
  
    
 9/4/2018 11:30 AM  
ESTABLISHED PATIENT with Momo Aguilar MD  
[facility]) Appt Note: 6 mo f/u  rescheduled from 8/24/18 dr meeting [address]  
[phone] Upcoming Health Maintenance Date Due  
 EYE EXAM RETINAL OR DILATED Q1 5/19/2017 GLAUCOMA SCREENING Q2Y 5/19/2018 Influenza Age 5 to Adult 8/1/2018 MEDICARE YEARLY EXAM 8/10/2018 MICROALBUMIN Q1 8/15/2018 HEMOGLOBIN A1C Q6M 8/21/2018 FOOT EXAM Q1 11/15/2018 LIPID PANEL Q1 8/22/2019 BREAST CANCER SCRN MAMMOGRAM 1/29/2020 COLONOSCOPY 11/14/2023 DTaP/Tdap/Td series (2 - Td) 7/5/2027 Allergies as of 8/30/2018  Review Complete On: 5/25/2018 By: Clarisa Patton NP Severity Noted Reaction Type Reactions Penicillins  11/14/2013    Unknown (comments) Current Immunizations  Never Reviewed Name Date Influenza High Dose Vaccine PF 11/15/2017 11:23 PM  
 Influenza Vaccine 9/30/2015, 10/7/2014 Influenza Vaccine (Quad) PF 9/21/2016 10:59 AM  
 Pneumococcal Conjugate (PCV-13) 2/3/2015 Pneumococcal Vaccine (Unspecified Type) 1/25/2008 Varicella Virus Vaccine 6/25/2011 Not reviewed this visit You Were Diagnosed With   
  
 Codes Comments Type 2 diabetes with nephropathy (HCC)     ICD-10-CM: E11.21 
ICD-9-CM: 250.40, 583.81 Vitals OB Status Smoking Status Hysterectomy Never Smoker Preferred Pharmacy Pharmacy Name Phone Ian Mace, [facility] [phone] Your Updated Medication List  
  
   
This list is accurate as of 8/30/18  3:06 PM.  Always use your most recent med list.  
  
  
  
  
 * amLODIPine 5 mg tablet Commonly known as:  Love Breach Take 1 Tab by mouth daily. * amLODIPine 10 mg tablet Commonly known as:  Love Breach TAKE 1 TABLET DAILY  
  
 ELIQUIS 5 mg tablet Generic drug:  apixaban TAKE 1 TABLET TWICE A DAY  
  
 lancets 33 gauge Misc Commonly known as: One Touch Delica  
by Does Not Apply route daily. E11.9  
  
 levothyroxine 137 mcg tablet Commonly known as:  SYNTHROID Take 137 mcg by mouth Daily (before breakfast). LORazepam 0.5 mg tablet Commonly known as:  ATIVAN Take 1 Tab by mouth every eight (8) hours as needed for Anxiety. Max Daily Amount: 1.5 mg.  
  
 losartan-hydroCHLOROthiazide 100-25 mg per tablet Commonly known as:  HYZAAR Take 1 Tab by mouth daily. meclizine 25 mg tablet Commonly known as:  ANTIVERT Take  by mouth three (3) times daily as needed. metFORMIN ER 1,000 mg Tr24 Take 1 Tab by mouth daily. metoprolol tartrate 50 mg tablet Commonly known as:  LOPRESSOR Take 1 Tab by mouth two (2) times a day. * ONE TOUCH TEST  
by Does Not Apply route daily. Indications: One Touch Verioflex Meter * glucose blood VI test strips strip Commonly known as:  ONETOUCH ULTRA TEST  
by Does Not Apply route daily. Use to test glucose daily E 11.9  
  
 rosuvastatin 10 mg tablet Commonly known as:  CRESTOR Take 1 Tab by mouth nightly. sertraline 50 mg tablet Commonly known as:  ZOLOFT  
TAKE 1 TABLET NIGHTLY AS NEEDED  
  
 therapeutic multivitamin tablet Commonly known as:  [facility] Take 1 Tab by mouth daily. * Notice: This list has 4 medication(s) that are the same as other medications prescribed for you. Read the directions carefully, and ask your doctor or other care provider to review them with you. We Performed the Following HEMOGLOBIN A1C WITH EAG [65732 CPT(R)] MICROALBUMIN, UR, RAND W/ MICROALB/CREAT RATIO [id number] CPT(R)] Patient Instructions If you have any questions regarding [employer], you may call [employer] support at [phone]. Introducing [facility]! Dear Kristy Small: 
Thank you for requesting a [employer] (formerly [employer]) account. Our records indicate that you already have an active [employer] (formerly [employer]) account. You can access your account anytime at [email or web address] Did you know that you can access your hospital and ER discharge instructions at any time in [employer] ([employer])? You can also review all of your test results from your hospital stay or ER visit. Additional Information If you have questions, please visit the Frequently Asked Questions section of the [employer] ([employer]) website at [email or web address] Remember, [employer] ([employer]) is NOT to be used for urgent needs. For medical emergencies, dial 911. Now available from your iPhone and Android! Please provide this summary of care documentation to your next provider. Your primary care clinician is listed as Leisa Nichols. If you have any questions after today's visit, please call [phone].

## 2018-08-31 LAB
ALBUMIN/CREAT UR: 28.5 MG/G CREAT (ref 0–30)
CREAT UR-MCNC: 56.2 MG/DL
EST. AVERAGE GLUCOSE BLD GHB EST-MCNC: 197 MG/DL
HBA1C MFR BLD: 8.5 % (ref 4.8–5.6)
MICROALBUMIN UR-MCNC: 16 UG/ML

## 2019-04-05 PROBLEM — E66.01 SEVERE OBESITY (HCC): Status: ACTIVE | Noted: 2019-04-05

## 2019-09-23 ENCOUNTER — PREPPED CHART (OUTPATIENT)
Dept: URBAN - METROPOLITAN AREA CLINIC 34 | Facility: CLINIC | Age: 74
End: 2019-09-23

## 2019-09-23 PROBLEM — H25.813 COMBINED FORMS OF AGE-RELATED CATARACT: Status: STABILIZING | Noted: 2019-09-23

## 2019-09-23 PROBLEM — H25.813 COMBINED FORMS OF AGE-RELATED CATARACT: Noted: 2019-09-23

## 2019-09-23 PROBLEM — H52.223 ASTIGMATISM, REGULAR: Noted: 2019-09-23

## 2019-09-23 PROBLEM — E11.39 DIABETES MELLITUS TYPE 2 WITH OTHER DIABETIC OPHTHALMIC COMPLICATION: Noted: 2019-09-23

## 2019-09-23 PROBLEM — H52.4 PRESBYOPIA: Noted: 2019-09-23

## 2020-07-15 ENCOUNTER — OFFICE VISIT (OUTPATIENT)
Dept: SLEEP MEDICINE | Age: 75
End: 2020-07-15

## 2020-07-15 VITALS — WEIGHT: 200 LBS | BODY MASS INDEX: 31.39 KG/M2 | HEIGHT: 67 IN

## 2020-07-15 DIAGNOSIS — I10 ESSENTIAL HYPERTENSION: ICD-10-CM

## 2020-07-15 DIAGNOSIS — Z86.79 S/P ABLATION OF ATRIAL FIBRILLATION: ICD-10-CM

## 2020-07-15 DIAGNOSIS — Z98.890 S/P ABLATION OF ATRIAL FIBRILLATION: ICD-10-CM

## 2020-07-15 DIAGNOSIS — I48.20 CHRONIC ATRIAL FIBRILLATION (HCC): ICD-10-CM

## 2020-07-15 DIAGNOSIS — G47.33 OSA (OBSTRUCTIVE SLEEP APNEA): Primary | ICD-10-CM

## 2020-07-15 NOTE — PATIENT INSTRUCTIONS
217 [facility] [place]. [place], [address]  Tel.  [phone]  Fax. 100 [facility] 60  [place], [address] [facility]  Tel.  [phone]  Fax. [phone] [address]  Tel.  [phone]  Fax. [phone]     Sleep Apnea: After Your Visit  Your Care Instructions  Sleep apnea occurs when you frequently stop breathing for 10 seconds or longer during sleep. It can be mild to severe, based on the number of times per hour that you stop breathing or have slowed breathing. Blocked or narrowed airways in your nose, mouth, or throat can cause sleep apnea. Your airway can become blocked when your throat muscles and tongue relax during sleep. Sleep apnea is common, occurring in 1 out of 20 individuals. Individuals having any of the following characteristics should be evaluated and treated right away due to high risk and detrimental consequences from untreated sleep apnea:  1. Obesity  2. Congestive Heart failure  3. Atrial Fibrillation  4. Uncontrolled Hypertension  5. Type II Diabetes  6. Night-time Arrhythmias  7. Stroke  8. Pulmonary Hypertension  9. High-risk Driving Populations (pilots, truck drivers, etc.)  10. Patients Considering Weight-loss Surgery    How do you know you have sleep apnea? You probably have sleep apnea if you answer 'yes' to 3 or more of the following questions:  S - Have you been told that you Snore? T - Are you often Tired during the day? O - Has anyone Observed you stop breathing while sleeping? P- Do you have (or are being treated for) high blood Pressure? B - Are you obese (Body Mass Index > 35)? A - Is your Age 48years old or older? N - Is your Neck size greater than 16 inches? G - Are you male Gender? A sleep physician can prescribe a breathing device that prevents tissues in the throat from blocking your airway.  Or your doctor may recommend using a dental device (oral breathing device) to help keep your airway open. In some cases, surgery may be needed to remove enlarged tissues in the throat. Follow-up care is a key part of your treatment and safety. Be sure to make and go to all appointments, and call your doctor if you are having problems. It's also a good idea to know your test results and keep a list of the medicines you take. How can you care for yourself at home? · Lose weight, if needed. It may reduce the number of times you stop breathing or have slowed breathing. · Go to bed at the same time every night. · Sleep on your side. It may stop mild apnea. If you tend to roll onto your back, sew a pocket in the back of your pajama top. Put a tennis ball into the pocket, and stitch the pocket shut. This will help keep you from sleeping on your back. · Avoid alcohol and medicines such as sleeping pills and sedatives before bed. · Do not smoke. Smoking can make sleep apnea worse. If you need help quitting, talk to your doctor about stop-smoking programs and medicines. These can increase your chances of quitting for good. · Prop up the head of your bed 4 to 6 inches by putting bricks under the legs of the bed. · Treat breathing problems, such as a stuffy nose, caused by a cold or allergies. · Use a continuous positive airway pressure (CPAP) breathing machine if lifestyle changes do not help your apnea and your doctor recommends it. The machine keeps your airway from closing when you sleep. · If CPAP does not help you, ask your doctor whether you should try other breathing machines. A bilevel positive airway pressure machine has two types of air pressureâone for breathing in and one for breathing out. Another device raises or lowers air pressure as needed while you breathe. · If your nose feels dry or bleeds when using one of these machines, talk with your doctor about increasing moisture in the air. A humidifier may help.   · If your nose is runny or stuffy from using a breathing machine, talk with your doctor about using decongestants or a corticosteroid nasal spray. When should you call for help? Watch closely for changes in your health, and be sure to contact your doctor if:  · You still have sleep apnea even though you have made lifestyle changes. · You are thinking of trying a device such as CPAP. · You are having problems using a CPAP or similar machine. Where can you learn more? Go to [employer]odoCast [employer]. Enter W832 in the search box to learn more about \"Sleep Apnea: After Your Visit. \"   © 9626-5859 Healthwise, [employer]. Care instructions adapted under license by [address] [place] [address] [employer] (which disclaims liability or warranty for this information). This care instruction is for use with your licensed healthcare professional. If you have questions about a medical condition or this instruction, always ask your healthcare professional. [employer] any warranty or liability for your use of this information. PROPER SLEEP HYGIENE    What to avoid  · Do not have drinks with caffeine, such as coffee or black tea, for 8 hours before bed. · Do not smoke or use other types of tobacco near bedtime. Nicotine is a stimulant and can keep you awake. · Avoid drinking alcohol late in the evening, because it can cause you to wake in the middle of the night. · Do not eat a big meal close to bedtime. If you are hungry, eat a light snack. · Do not drink a lot of water close to bedtime, because the need to urinate may wake you up during the night. · Do not read or watch TV in bed. Use the bed only for sleeping and sexual activity. What to try  · Go to bed at the same time every night, and wake up at the same time every morning. Do not take naps during the day. · Keep your bedroom quiet, dark, and cool. · Get regular exercise, but not within 3 to 4 hours of your bedtime. .  · Sleep on a comfortable pillow and mattress.   · If watching the clock makes you anxious, turn it facing away from you so you cannot see the time. · If you worry when you lie down, start a worry book. Well before bedtime, write down your worries, and then set the book and your concerns aside. · Try meditation or other relaxation techniques before you go to bed. · If you cannot fall asleep, get up and go to another room until you feel sleepy. Do something relaxing. Repeat your bedtime routine before you go to bed again. · Make your house quiet and calm about an hour before bedtime. Turn down the lights, turn off the TV, log off the computer, and turn down the volume on music. This can help you relax after a busy day. Drowsy Driving  The [address] [place] [address] Road Traffic Safety Administration cites drowsiness as a causing factor in more than 319,583 police reported crashes annually, resulting in 76,000 injuries and 1,500 deaths. Other surveys suggest 55% of people polled have driven while drowsy in the past year, 23% had fallen asleep but not crashed, 3% crashed, and 2% had and accident due to drowsy driving. Who is at risk? Young Drivers: One study of drowsy driving accidents states that 55% of the drivers were under 25 years. Of those, 75% were male. Shift Workers and Travelers: People who work overnight or travel across time zones frequently are at higher risk of experiencing Circadian Rhythm Disorders. They are trying to work and function when their body is programed to sleep. Sleep Deprived: Lack of sleep has a serious impact on your ability to pay attention or focus on a task. Consistently getting less than the average of 8 hours your body needs creates partial or cumulative sleep deprivation. Untreated Sleep Disorders: Sleep Apnea, Narcolepsy, R.L.S., and other sleep disorders (untreated) prevent a person from getting enough restful sleep. This leads to excessive daytime sleepiness and increases the risk for drowsy driving accidents by up to 7 times.   Medications / Alcohol: Even over the counter medications can cause drowsiness. Medications that impair a drivers attention should have a warning label. Alcohol naturally makes you sleepy and on its own can cause accidents. Combined with excessive drowsiness its effects are amplified. Signs of Drowsy Driving:   * You don't remember driving the last few miles   * You may drift out of your francesca   * You are unable to focus and your thoughts wander   * You may yawn more often than normal   * You have difficulty keeping your eyes open / nodding off   * Missing traffic signs, speeding, or tailgating  Prevention-   Good sleep hygiene, lifestyle and behavioral choices have the most impact on drowsy driving. There is no substitute for sleep and the average person requires 8 hours nightly. If you find yourself driving drowsy, stop and sleep. Consider the sleep hygiene tips provided during your visit as well. Medication Refill Policy: Refills for all medications require 1 week advance notice. Please have your pharmacy fax a refill request. We are unable to fax, or call in \"controled substance\" medications and you will need to pick these prescriptions up from our office. [employer] Activation    Thank you for requesting access to [employer]. Please follow the instructions below to securely access and download your online medical record. [employer] allows you to send messages to your doctor, view your test results, renew your prescriptions, schedule appointments, and more. How Do I Sign Up? 1. In your internet browser, go to [email or web address]. 2. Click on the First Time User? Click Here link in the Sign In box. You will see the New Member Sign Up page. 3. Enter your [employer] Access Code exactly as it appears below. You will not need to use this code after youve completed the sign-up process. If you do not sign up before the expiration date, you must request a new code. [employer] Access Code:  Activation code not generated  Current [employer]aker Status: Active (This is the date your [employer] access code will )    4. Enter the last four digits of your Social Security Number (xxxx) and Date of Birth (mm/dd/yyyy) as indicated and click Submit. You will be taken to the next sign-up page. 5. Create a Minutizert ID. This will be your [employer] login ID and cannot be changed, so think of one that is secure and easy to remember. 6. Create a [employer] password. You can change your password at any time. 7. Enter your Password Reset Question and Answer. This can be used at a later time if you forget your password. 8. Enter your e-mail address. You will receive e-mail notification when new information is available in [address] 9. Click Sign Up. You can now view and download portions of your medical record. 10. Click the Download Summary menu link to download a portable copy of your medical information. Additional Information    If you have questions, please call [phone]. Remember, [employer] is NOT to be used for urgent needs. For medical emergencies, dial 911.

## 2020-08-05 ENCOUNTER — TELEPHONE (OUTPATIENT)
Dept: SLEEP MEDICINE | Age: 75
End: 2020-08-05

## 2020-08-31 ENCOUNTER — DOCUMENTATION ONLY (OUTPATIENT)
Dept: SLEEP MEDICINE | Age: 75
End: 2020-08-31

## 2020-09-09 ENCOUNTER — DOCUMENTATION ONLY (OUTPATIENT)
Dept: SLEEP MEDICINE | Age: 75
End: 2020-09-09

## 2020-09-09 NOTE — PROGRESS NOTES
Schedule split study on 9/16/20 [facility] to call and schedule test.

## 2020-09-28 ENCOUNTER — DOCUMENTATION ONLY (OUTPATIENT)
Dept: SLEEP MEDICINE | Age: 75
End: 2020-09-28

## 2020-10-01 ENCOUNTER — TELEPHONE (OUTPATIENT)
Dept: SLEEP MEDICINE | Age: 75
End: 2020-10-01

## 2020-10-01 DIAGNOSIS — G47.33 OSA (OBSTRUCTIVE SLEEP APNEA): Primary | ICD-10-CM

## 2020-10-02 NOTE — TELEPHONE ENCOUNTER
Results of Sleep Testing reviewed with patient who agrees to a trial of APAP therapy. PAP prescription and follow-up discussed with patient. Patient encouraged to call if there were any further questions regarding sleep symptoms. Encounter Diagnosis   Name Primary?  BRENT (obstructive sleep apnea) Yes       Orders Placed This Encounter    AMB SUPPLY ORDER     Diagnosis: (G47.33) BRENT (obstructive sleep apnea)  (primary encounter diagnosis)     Respironics DreamStation ( Unit - Auto Mode) / Heated Humidifier :    Positive Airway Pressure Therapy: Duration of need: 99 months. Auto - PAP: 10 - 20 cmH2O; Optistart enabled. Ramp Time: 30 Minutes; Flex: 2. Remote monitoring enrollment.  Nasal Cushion (Replace) 2 per month.  Nasal Interface Mask 1 every 3 months.  Headgear 1 every 6 months.  Filter(s) Disposable 2 per month.  Filter(s) Non-Disposable 1 every 6 months. [address] [facility] [address] for [employer] (Replace) 1 every 6 months.  Tubing with heating element 1 every 3 months. Perform Mask Fitting per patient preference and comfort - replace as above. Sindhu Arreaga MD, FAA; NPI: [id number]  Electronically signed. 10/02/20

## 2020-10-05 ENCOUNTER — DOCUMENTATION ONLY (OUTPATIENT)
Dept: SLEEP MEDICINE | Age: 75
End: 2020-10-05

## 2021-01-25 ENCOUNTER — DOCUMENTATION ONLY (OUTPATIENT)
Dept: SLEEP MEDICINE | Age: 76
End: 2021-01-25

## 2021-01-25 ENCOUNTER — VIRTUAL VISIT (OUTPATIENT)
Dept: SLEEP MEDICINE | Age: 76
End: 2021-01-25
Payer: MEDICARE

## 2021-01-25 DIAGNOSIS — G47.33 OSA (OBSTRUCTIVE SLEEP APNEA): Primary | ICD-10-CM

## 2021-01-25 DIAGNOSIS — I10 ESSENTIAL HYPERTENSION: ICD-10-CM

## 2021-01-25 DIAGNOSIS — E11.21 TYPE 2 DIABETES WITH NEPHROPATHY (HCC): ICD-10-CM

## 2021-01-25 PROCEDURE — 99442 PR PHYS/QHP TELEPHONE EVALUATION 11-20 MIN: CPT | Performed by: NURSE PRACTITIONER

## 2021-01-25 NOTE — PATIENT INSTRUCTIONS
217 [facility]., [place]. 1668 [facility], [address] Tel.  [phone] Fax. 100 [facility] 60 [place], 200 [address] [facility] Tel.  [phone] Fax. [phone] [address] [place] [place] Tel.  [phone] Fax. [phone] Learning About CPAP for Sleep Apnea What is CPAP? CPAP is a small machine that you use at home every night while you sleep. It increases air pressure in your throat to keep your airway open. When you have sleep apnea, this can help you sleep better so you feel much better. CPAP stands for \"continuous positive airway pressure. \" The CPAP machine will have one of the following: · A mask that covers your nose and mouth · Prongs that fit into your nose · A mask that covers your nose only, the most common type. This type is called NCPAP. The N stands for \"nasal.\" Why is it done? CPAP is usually the best treatment for obstructive sleep apnea. It is the first treatment choice and the most widely used. Your doctor may suggest CPAP if you have: · Moderate to severe sleep apnea. · Sleep apnea and coronary artery disease (CAD) or heart failure. How does it help? · CPAP can help you have more normal sleep, so you feel less sleepy and more alert during the daytime. · CPAP may help keep heart failure or other heart problems from getting worse. · NCPAP may help lower your blood pressure. · If you use CPAP, your bed partner may also sleep better because you are not snoring or restless. What are the side effects? Some people who use CPAP have: · A dry or stuffy nose and a sore throat. · Irritated skin on the face. · Sore eyes. · Bloating. If you have any of these problems, work with your doctor to fix them. Here are some things you can try: · Be sure the mask or nasal prongs fit well. · See if your doctor can adjust the pressure of your CPAP. · If your nose is dry, try a humidifier. · If your nose is runny or stuffy, try decongestant medicine or a steroid nasal spray. If these things do not help, you might try a different type of machine. Some machines have air pressure that adjusts on its own. Others have air pressures that are different when you breathe in than when you breathe out. This may reduce discomfort caused by too much pressure in your nose. Where can you learn more? Go to [email or web address] Enter Z034 in the search box to learn more about \"Learning About CPAP for Sleep Apnea. \"  
© 6840-0879 Healthwise, Incorporated. Care instructions adapted under license by [address] [place] [address] (which disclaims liability or warranty for this information). This care instruction is for use with your licensed healthcare professional. If you have questions about a medical condition or this instruction, always ask your healthcare professional. [address] any warranty or liability for your use of this information. Content Version: 4.4.85985; Last Revised: January 11, 2010 PROPER SLEEP HYGIENE What to avoid · Do not have drinks with caffeine, such as coffee or black tea, for 8 hours before bed. · Do not smoke or use other types of tobacco near bedtime. Nicotine is a stimulant and can keep you awake. · Avoid drinking alcohol late in the evening, because it can cause you to wake in the middle of the night. · Do not eat a big meal close to bedtime. If you are hungry, eat a light snack. · Do not drink a lot of water close to bedtime, because the need to urinate may wake you up during the night. · Do not read or watch TV in bed. Use the bed only for sleeping and sexual activity. What to try · Go to bed at the same time every night, and wake up at the same time every morning. Do not take naps during the day. · Keep your bedroom quiet, dark, and cool. · Get regular exercise, but not within 3 to 4 hours of your bedtime. Inna Rawls · Sleep on a comfortable pillow and mattress. · If watching the clock makes you anxious, turn it facing away from you so you cannot see the time. · If you worry when you lie down, start a worry book. Well before bedtime, write down your worries, and then set the book and your concerns aside. · Try meditation or other relaxation techniques before you go to bed. · If you cannot fall asleep, get up and go to another room until you feel sleepy. Do something relaxing. Repeat your bedtime routine before you go to bed again. · Make your house quiet and calm about an hour before bedtime. Turn down the lights, turn off the TV, log off the computer, and turn down the volume on music. This can help you relax after a busy day. Drowsy Driving: The [employer] cites drowsiness as a causing factor in more than 761,395 police reported crashes annually, resulting in 76,000 injuries and 1,500 deaths. Other surveys suggest 55% of people polled have driven while drowsy in the past year, 23% had fallen asleep but not crashed, 3% crashed, and 2% had and accident due to drowsy driving. Who is at risk? Young Drivers: One study of drowsy driving accidents states that 55% of the drivers were under 25 years. Of those, 75% were male. Shift Workers and Travelers: People who work overnight or travel across time zones frequently are at higher risk of experiencing Circadian Rhythm Disorders. They are trying to work and function when their body is programed to sleep. Sleep Deprived: Lack of sleep has a serious impact on your ability to pay attention or focus on a task. Consistently getting less than the average of 8 hours your body needs creates partial or cumulative sleep deprivation. Untreated Sleep Disorders: Sleep Apnea, Narcolepsy, R.L.S., and other sleep disorders (untreated) prevent a person from getting enough restful sleep. This leads to excessive daytime sleepiness and increases the risk for drowsy driving accidents by up to 7 times. Medications / Alcohol: Even over the counter medications can cause drowsiness. Medications that impair a drivers attention should have a warning label. Alcohol naturally makes you sleepy and on its own can cause accidents. Combined with excessive drowsiness its effects are amplified. Signs of Drowsy Driving: * You don't remember driving the last few miles * You may drift out of your francesca * You are unable to focus and your thoughts wander * You may yawn more often than normal 
 * You have difficulty keeping your eyes open / nodding off * Missing traffic signs, speeding, or tailgating Prevention-  
Good sleep hygiene, lifestyle and behavioral choices have the most impact on drowsy driving. There is no substitute for sleep and the average person requires 8 hours nightly. If you find yourself driving drowsy, stop and sleep. Consider the sleep hygiene tips provided during your visit as well. Medication Refill Policy: Refills for all medications require 1 week advance notice. Please have your pharmacy fax a refill request. We are unable to fax, or call in \"controled substance\" medications and you will need to pick these prescriptions up from our office. JDF Activation Thank you for requesting access to JDF. Please follow the instructions below to securely access and download your online medical record. JDF allows you to send messages to your doctor, view your test results, renew your prescriptions, schedule appointments, and more. How Do I Sign Up? 1. In your internet browser, go to [email or web address]. 2. Click on the First Time User? Click Here link in the Sign In box. You will see the New Member Sign Up page. 3. Enter your [employer] Access Code exactly as it appears below. You will not need to use this code after youve completed the sign-up process. If you do not sign up before the expiration date, you must request a new code. MyChart Access Code: Activation code not generated Current [employer] Status: Active (This is the date your MyChart access code will ) 4. Enter the last four digits of your Social Security Number (xxxx) and Date of Birth (mm/dd/yyyy) as indicated and click Submit. You will be taken to the next sign-up page. 5. Create a Vyyknt ID. This will be your [employer] login ID and cannot be changed, so think of one that is secure and easy to remember. 6. Create a [employer] password. You can change your password at any time. 7. Enter your Password Reset Question and Answer. This can be used at a later time if you forget your password. 8. Enter your e-mail address. You will receive e-mail notification when new information is available in [address] 9. Click Sign Up. You can now view and download portions of your medical record. 10. Click the Download Summary menu link to download a portable copy of your medical information. Additional Information If you have questions, please call [phone]. Remember, [employer] is NOT to be used for urgent needs. For medical emergencies, dial 911.

## 2021-01-25 NOTE — PROGRESS NOTES
[address] [facility] [address] [place]. [place], [address]   Tel.  [phone]   Fax. 100 [facility] 60   [place], 200 S [facility]   Tel.  [phone]   Fax. [phone] [address] [facility]   Tel.  [phone]   Fax. [phone]     Megan Ghotra (: 1945) is a 68 y.o. female, established patient, seen for positive airway pressure follow-up and evaluation of the following chief complaint(s):   Sleep Apnea follow up  - first adherence on new device    Megan Ghotra was last seen by Dr. Leoncio Rowley on 7/15/2020, prior notes reviewed in detail. Initial sleep apnea diagnosis approx . In lab split sleep test 2020 showed AHI of 33.9/hr with a lowest SaO2 of 88%, duration of SaO2 < 88% 0 min. ASSESSMENT/PLAN:    ICD-10-CM ICD-9-CM    1. BRENT (obstructive sleep apnea)  G47.33 327.23 AMB SUPPLY ORDER      AMB SUPPLY ORDER      AMB SUPPLY ORDER   2. Essential hypertension  I10 401.9    3. Type 2 diabetes with nephropathy (HCC)  E11.21 250.40      583.81    4. BMI 31.0-31.9,adult  Z68.31 V85.31        AHI = 33.9(2020). On APAP, Respironics :  10-20 cmH2O. Set up 10/26/2020. Compliance met    She is adherent with PAP therapy and PAP continues to benefit patient and remains necessary for control of her sleep apnea. Follow-up and Dispositions    · Return in about 1 year (around 2022). 1. Sleep Apnea -   Change APAP pressure to 10-16 cm H2O. Changes made by provider in care  system and [place] [facility]    * Supplies ordered - nasal mask or full face and heated tubing    Orders Placed This Encounter    AMB SUPPLY ORDER     Diagnosis: (G47.33) BRENT (obstructive sleep apnea)  (primary encounter diagnosis)     Pressure change APAP to 10-16 cmH2O. Changes made in sleep lab. KANCHAN Fontenot; NPI: [id number]    Electronically signed.  Date:- 21    AMB SUPPLY ORDER     Diagnosis: (G47.33) BRENT (obstructive sleep apnea)  (primary encounter diagnosis)     Replacement Supplies for Positive Airway Pressure Therapy Device:   Duration of need: 99 months.        Full Face Mask 1 every 3 months.   Full Face Mask Cushion 1 per month.     Headgear 1 every 6 months.     Tubing with heating element 1 every 3 months.     Filter(s) Disposable 2 per month.   Filter(s) Non-Disposable 1 every 6 months.   .    Water Chamber for Humidifier (Replace) 1 every 6 months.      KANCHAN Mcgovern; NPI: [id number]    Electronically signed. Date:- 01/25/21   • AMB SUPPLY ORDER     Diagnosis: (G47.33) RBENT (obstructive sleep apnea)  (primary encounter diagnosis)     Replacement Supplies for Positive Airway Pressure Therapy Device:   Duration of need: 99 months.        Nasal Cushion (Replace) 2 per month.   Nasal Interface Mask 1 every 3 months.   Pos Airway pressure chin strap   Headgear 1 every 6 months.     Tubing with heating element 1 every 3 months.     Filter(s) Disposable 2 per month.   Filter(s) Non-Disposable 1 every 6 months.   .    Water Chamber for Humidifier (Replace) 1 every 6 months.      KANCHAN Mcgovern; NPI: [id number]    Electronically signed. Date:- 01/25/21       * Counseling was provided regarding the importance of regular PAP use with emphasis on ensuring sufficient total sleep time, proper sleep hygiene, and safe driving.    * Re-enforced proper and regular cleaning for the device.    * She was asked to contact our office for any problems regarding PAP therapy.    2. Hypertension -  continue on her current regimen, she will continue to monitor her BP and follow up with her PMD for reevaluation/adjustment of medications if warranted.  I have reviewed the relationship between hypertension as it relates to sleep-disordered breathing.    3. Type II diabetes -  she continues on her current regimen.  I have reviewed the relationship  between sleep disordered breathing as it relates to diabetes. 4. Recommended a dedicated weight loss program through appropriate diet and exercise regimen as significant weight reduction has been shown to reduce severity of obstructive sleep apnea. SUBJECTIVE/OBJECTIVE:    She  is seen today for follow up on PAP device and reports no problems using the device. The following concerns identified:    Drowsiness no Problems exhaling no   Snoring no Forget to put on no   Mask Comfortable yes Can't fall asleep no   Dry Mouth no Mask falls off no   Air Leaking no Frequent awakenings no       She admits that her sleep has improved on PAP therapy using nasal mask and heated tubing. She notes she likes the new device. Review of device download indicated:  Auto pressure: 10-20 cmH2O; Peak Avg Pressure: 16.9 cmH2O;  Avg. Device Pressure <= 90 %: 10.6 cmH2O    Average % Night in Large Leak:  38.7  % Used Days >= 4 hours: 100. Avg hours used:  9:16. Therapy Apnea Index averaged over PAP use: 4.4 /hr which reflects improved sleep breathing condition. [place] Sleepiness Score: 0 and Modified F.O.S.Q. Score Total / 2: 20 which reflects improved sleep quality over therapy time. Sleep Review of Systems: notable for Negative difficulty falling asleep; Positive awakenings at night; Negative early morning headaches; Negative memory problems; Negative concentration issues; Negative chest pain; Negative shortness of breath; Negative significant joint pain at night; Negative significant muscle pain at night; Negative rashes or itching; Negative heartburn; Negative significant mood issues; 0 afternoon naps per week    Vitals reported by patient     Patient-Reported Vitals 1/25/2021   Patient-Reported Weight 197   Patient-Reported Systolic  078   Patient-Reported Diastolic 68      Calculated BMI 31    Physical Exam not completed due to audio only visit.     Pursuant to the emergency declaration under the [address] and the [employer] Act, [facility] waiver authority and the Coronavirus Preparedness and [employer] Act, this telephone encounter was conducted with patient's (and/or legal guardian's) consent, to reduce the risk of exposure to COVID-19 and provide necessary medical care. Services were provided through a telephone call to substitute for in-person encounter. I was in the office while conducting this encounter, patient located at their home or alternate location of their choice. Patient identification was verified at the start of the visit: YES using name and date of birth. Patient's phone number [phone] (home)  was confirmed for accuracy. She gives permission for messages regarding results and appointments to be left at that number. On this date 01/25/21 I have spent 20 minutes reviewing previous notes, test results, and on an audio only visit with the patient discussing the diagnosis and importance of compliance with the treatment plan as well as documenting on the day of the visit. An electronic signature was used to authenticate this note.   -- Marisol Muñoz NP, [facility]  01/25/21

## 2021-04-15 ENCOUNTER — OFFICE VISIT (OUTPATIENT)
Dept: FAMILY MEDICINE CLINIC | Age: 76
End: 2021-04-15
Payer: MEDICARE

## 2021-04-15 VITALS
HEIGHT: 67 IN | HEART RATE: 64 BPM | TEMPERATURE: 96.9 F | SYSTOLIC BLOOD PRESSURE: 139 MMHG | BODY MASS INDEX: 30.35 KG/M2 | DIASTOLIC BLOOD PRESSURE: 70 MMHG | WEIGHT: 193.4 LBS | OXYGEN SATURATION: 98 %

## 2021-04-15 DIAGNOSIS — E78.5 HYPERLIPIDEMIA, UNSPECIFIED HYPERLIPIDEMIA TYPE: Primary | ICD-10-CM

## 2021-04-15 DIAGNOSIS — E66.01 SEVERE OBESITY (HCC): ICD-10-CM

## 2021-04-15 DIAGNOSIS — E11.21 TYPE 2 DIABETES WITH NEPHROPATHY (HCC): ICD-10-CM

## 2021-04-15 DIAGNOSIS — I48.20 CHRONIC ATRIAL FIBRILLATION (HCC): ICD-10-CM
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
## 2021-04-15 LAB
CHOLEST SERPL-MCNC: 189 MG/DL
EST. AVERAGE GLUCOSE BLD GHB EST-MCNC: 157 MG/DL
HBA1C MFR BLD: 7.1 % (ref 4–5.6)
HDLC SERPL-MCNC: 75 MG/DL
HDLC SERPL: 2.5 {RATIO} (ref 0–5)
LDLC SERPL CALC-MCNC: 92.8 MG/DL (ref 0–100)
LIPID PROFILE,FLP: NORMAL
TRIGL SERPL-MCNC: 106 MG/DL (ref ?–150)
VLDLC SERPL CALC-MCNC: 21.2 MG/DL

## 2021-04-15 PROCEDURE — G8399 PT W/DXA RESULTS DOCUMENT: HCPCS | Performed by: FAMILY MEDICINE

## 2021-04-15 PROCEDURE — G8427 DOCREV CUR MEDS BY ELIG CLIN: HCPCS | Performed by: FAMILY MEDICINE

## 2021-04-15 PROCEDURE — 1090F PRES/ABSN URINE INCON ASSESS: CPT | Performed by: FAMILY MEDICINE

## 2021-04-15 PROCEDURE — G8536 NO DOC ELDER MAL SCRN: HCPCS | Performed by: FAMILY MEDICINE

## 2021-04-15 PROCEDURE — 1100F PTFALLS ASSESS-DOCD GE2>/YR: CPT | Performed by: FAMILY MEDICINE

## 2021-04-15 PROCEDURE — G8752 SYS BP LESS 140: HCPCS | Performed by: FAMILY MEDICINE

## 2021-04-15 PROCEDURE — G8510 SCR DEP NEG, NO PLAN REQD: HCPCS | Performed by: FAMILY MEDICINE

## 2021-04-15 PROCEDURE — 3288F FALL RISK ASSESSMENT DOCD: CPT | Performed by: FAMILY MEDICINE

## 2021-04-15 PROCEDURE — G8417 CALC BMI ABV UP PARAM F/U: HCPCS | Performed by: FAMILY MEDICINE

## 2021-04-15 PROCEDURE — G8754 DIAS BP LESS 90: HCPCS | Performed by: FAMILY MEDICINE

## 2021-04-15 PROCEDURE — 99214 OFFICE O/P EST MOD 30 MIN: CPT | Performed by: FAMILY MEDICINE

## 2021-04-15 NOTE — PROGRESS NOTES
Chief Complaint   Patient presents with    Tick Removal     1. Have you been to the ER, urgent care clinic since your last visit? Hospitalized since your last visit? No    2. Have you seen or consulted any other health care providers outside of the [address] [place] [address] since your last visit? Include any pap smears or colon screening. Cardiologist 2 months ago     4/15/2021      Chief Complaint   Patient presents with    Tick Removal         History of Present Illness:         is a 68 y.o. female who found two tick on shoulder and L leg yesterday. Attached < 24 hrs. Local irritation but no rash. Feels well otherwise. Has lost #17 in last year and would like to see it's effect on her BS and lipids. Allergies   Allergen Reactions    Penicillins Unknown (comments)       Current Outpatient Medications   Medication Sig    metoprolol succinate (TOPROL-XL) 100 mg tablet TAKE 1 TABLET DAILY FOR    HEART AND PRESSURE         (REPLACES METOPROLOL       TARTRATE)    losartan-hydroCHLOROthiazide (HYZAAR) 100-25 mg per tablet TAKE 1 TABLET DAILY    levothyroxine (SYNTHROID) 125 mcg tablet Take 1 Tab by mouth Daily (before breakfast).  glucose blood VI test strips (OneTouch Verio test strips) strip USE 1 STRIP TO CHECK GLUCOSE ONCE DAILY    rosuvastatin (CRESTOR) 10 mg tablet Take 1 Tab by mouth nightly.  metFORMIN (GLUCOPHAGE) 500 mg tablet TAKE 1 TABLET THREE TIMES A DAY WITH MEALS    meclizine (ANTIVERT) 25 mg tablet Take 1 Tab by mouth three (3) times daily as needed for Dizziness.  amLODIPine (NORVASC) 2.5 mg tablet Take 1 Tab by mouth daily. Takes along with 5mg tabs for a total of 7.5mg Qday    amLODIPine (NORVASC) 5 mg tablet Take 1 Tab by mouth daily.  apixaban (Eliquis) 5 mg tablet Take 1 Tab by mouth two (2) times a day.     lancets (One Touch Delica) 33 gauge [facility] USE 1 LANCET EACH TIME TO TEST BLOOD SUGAR DAILY    glucose blood VI test strips (OneTouch Ultra Test) strip by Does Not Apply route daily. Use to test glucose daily E 11.9    BLOOD SUGAR DIAGNOSTIC (ONE TOUCH TEST) by Does Not Apply route daily. Indications: One Touch Verioflex Meter    therapeutic multivitamin (THERAGRAN) tablet Take 1 Tab by mouth daily. No current facility-administered medications for this visit. Physical Examination:    Visit Vitals  /70   Pulse 64   Temp 96.9 °F (36.1 °C) (Temporal)   Ht 5' 7\" (1.702 m)   Wt 193 lb 6.4 oz (87.7 kg)   SpO2 98%   BMI 30.29 kg/m²      General:  Alert, cooperative, no distress. HEENT:  Normocephalic, without obvious abnormality, atraumatic. Conjunctivae/corneas clear. Pupils equal, round, reactive to light. Extraocular movements intact. TMs and external canals normal bilaterally. Nasal mucosa and oropharynx clear. Lungs: Clear to auscultation bilaterally. Chest wall:  No tenderness or deformity. Heart:  Regular rate and rhythm, S1, S2 normal, no murmur, click, rub, or gallop. Abdomen:   Soft, non-tender. Bowel sounds normal. No masses. No organomegaly. Extremities: Extremities normal, atraumatic, no cyanosis or edema. Pulses: 2+ and symmetric all extremities. Skin: Skin color, texture, turgor normal. Healing tick bite L shoulder and popliteal fossa. No rash   Lymph nodes: Cervical, supraclavicular, and axillary nodes normal.   Neurologic: CNII-XII intact. Normal strength, sensation, and reflexes throughout. ASSESSMENT AND PLAN    1. Hyperlipidemia, unspecified hyperlipidemia type    - HEMOGLOBIN A1C WITH EAG; Future  - LIPID PANEL; Future  - LIPID PANEL  - HEMOGLOBIN A1C WITH EAG    2. Type 2 diabetes with nephropathy ([address]  Should be improved  - HEMOGLOBIN A1C WITH EAG; Future  - LIPID PANEL; Future  - LIPID PANEL  - HEMOGLOBIN A1C WITH EAG    3. Chronic atrial fibrillation (HCC)  stable    4. Severe obesity ([address]  improved    5.  Tick bite, initial encounter  Reassured on low risk nature of short attachment in absence of symptoms            Orders Placed This Encounter    HEMOGLOBIN A1C WITH EAG     Standing Status:   Future     Number of Occurrences:   1     Standing Expiration Date:   4/15/2022    LIPID PANEL     Standing Status:   Future     Number of Occurrences:   1     Standing Expiration Date:   4/15/2022           Teodora Santizo MD

## 2021-04-16 NOTE — PROGRESS NOTES
Cholesterol and HgbA1c are both significantly improved with your weight loss. Keep up the good work!

## 2021-08-20 ENCOUNTER — TELEPHONE (OUTPATIENT)
Dept: FAMILY MEDICINE CLINIC | Age: 76
End: 2021-08-20

## 2021-08-20 NOTE — TELEPHONE ENCOUNTER
Detail message left for patient stating that PCP is still awaiting guidance form CDC and other authorities. Informed patient that the news is reccomending 3rd shots 8 months after 2nd vaccine.  Patient to call if any additional questions

## 2021-08-20 NOTE — TELEPHONE ENCOUNTER
[phone] contact # jacinda Mary, would like a call back concerning the 3rd injection and whether or not she & spouse should take this injection.     Thanks,

## 2021-10-25 ENCOUNTER — TELEPHONE (OUTPATIENT)
Dept: FAMILY MEDICINE CLINIC | Age: 76
End: 2021-10-25

## 2021-10-25 NOTE — TELEPHONE ENCOUNTER
----- Message from Earmon Sheets sent at 10/25/2021  2:34 PM EDT -----  Subject: Message to Provider    QUESTIONS  Information for Provider? Pt. is requesting a call back from the office. Please advise.   ---------------------------------------------------------------------------  --------------  CALL BACK INFO  What is the best way for the office to contact you? OK to leave message on   voicemail  Preferred Call Back Phone Number? [phone]  ---------------------------------------------------------------------------  --------------  SCRIPT ANSWERS  Relationship to Patient?  Self

## 2022-01-06 ENCOUNTER — OFFICE VISIT (OUTPATIENT)
Dept: SLEEP MEDICINE | Age: 77
End: 2022-01-06
Payer: MEDICARE

## 2022-01-06 VITALS
DIASTOLIC BLOOD PRESSURE: 77 MMHG | OXYGEN SATURATION: 98 % | HEIGHT: 66 IN | HEART RATE: 65 BPM | TEMPERATURE: 95.7 F | BODY MASS INDEX: 30.76 KG/M2 | WEIGHT: 191.4 LBS | SYSTOLIC BLOOD PRESSURE: 154 MMHG

## 2022-01-06 DIAGNOSIS — E11.21 TYPE 2 DIABETES WITH NEPHROPATHY (HCC): ICD-10-CM

## 2022-01-06 DIAGNOSIS — G47.33 OSA (OBSTRUCTIVE SLEEP APNEA): Primary | ICD-10-CM

## 2022-01-06 DIAGNOSIS — I10 ESSENTIAL HYPERTENSION: ICD-10-CM

## 2022-01-06 PROCEDURE — G8754 DIAS BP LESS 90: HCPCS | Performed by: NURSE PRACTITIONER

## 2022-01-06 PROCEDURE — 99213 OFFICE O/P EST LOW 20 MIN: CPT | Performed by: NURSE PRACTITIONER

## 2022-01-06 PROCEDURE — G8427 DOCREV CUR MEDS BY ELIG CLIN: HCPCS | Performed by: NURSE PRACTITIONER

## 2022-01-06 PROCEDURE — G8432 DEP SCR NOT DOC, RNG: HCPCS | Performed by: NURSE PRACTITIONER

## 2022-01-06 PROCEDURE — 1090F PRES/ABSN URINE INCON ASSESS: CPT | Performed by: NURSE PRACTITIONER

## 2022-01-06 PROCEDURE — G8417 CALC BMI ABV UP PARAM F/U: HCPCS | Performed by: NURSE PRACTITIONER

## 2022-01-06 PROCEDURE — G8753 SYS BP > OR = 140: HCPCS | Performed by: NURSE PRACTITIONER

## 2022-01-06 PROCEDURE — G8536 NO DOC ELDER MAL SCRN: HCPCS | Performed by: NURSE PRACTITIONER

## 2022-01-06 PROCEDURE — G8399 PT W/DXA RESULTS DOCUMENT: HCPCS | Performed by: NURSE PRACTITIONER

## 2022-01-06 PROCEDURE — 1101F PT FALLS ASSESS-DOCD LE1/YR: CPT | Performed by: NURSE PRACTITIONER

## 2022-01-06 NOTE — PATIENT INSTRUCTIONS
217 [facility] [place]. [place], [address]  Tel.  [phone]  Fax. 100 [facility] 60  [place], [address] [facility]  Tel.  [phone]  Fax. [phone] [address]  Tel.  [phone]  Fax. [phone]     Learning About CPAP for Sleep Apnea  What is CPAP? CPAP is a small machine that you use at home every night while you sleep. It increases air pressure in your throat to keep your airway open. When you have sleep apnea, this can help you sleep better so you feel much better. CPAP stands for \"continuous positive airway pressure. \"  The CPAP machine will have one of the following:  · A mask that covers your nose and mouth  · Prongs that fit into your nose  · A mask that covers your nose only, the most common type. This type is called NCPAP. The N stands for \"nasal.\"  Why is it done? CPAP is usually the best treatment for obstructive sleep apnea. It is the first treatment choice and the most widely used. Your doctor may suggest CPAP if you have:  · Moderate to severe sleep apnea. · Sleep apnea and coronary artery disease (CAD) or heart failure. How does it help? · CPAP can help you have more normal sleep, so you feel less sleepy and more alert during the daytime. · CPAP may help keep heart failure or other heart problems from getting worse. · NCPAP may help lower your blood pressure. · If you use CPAP, your bed partner may also sleep better because you are not snoring or restless. What are the side effects? Some people who use CPAP have:  · A dry or stuffy nose and a sore throat. · Irritated skin on the face. · Sore eyes. · Bloating. If you have any of these problems, work with your doctor to fix them. Here are some things you can try:  · Be sure the mask or nasal prongs fit well. · See if your doctor can adjust the pressure of your CPAP. · If your nose is dry, try a humidifier.   · If your nose is runny or stuffy, try decongestant medicine or a steroid nasal spray. If these things do not help, you might try a different type of machine. Some machines have air pressure that adjusts on its own. Others have air pressures that are different when you breathe in than when you breathe out. This may reduce discomfort caused by too much pressure in your nose. Where can you learn more? Go to [employer]  Enter Allison Faustin in the search box to learn more about \"Learning About CPAP for Sleep Apnea. \"   © 3340-6690 Healthwise, Incorporated. Care instructions adapted under license by [facility] [employer] (which disclaims liability or warranty for this information). This care instruction is for use with your licensed healthcare professional. If you have questions about a medical condition or this instruction, always ask your healthcare professional. [address] any warranty or liability for your use of this information. Content Version: 9.9.64005; Last Revised: January 11, 2010  PROPER SLEEP HYGIENE    What to avoid  · Do not have drinks with caffeine, such as coffee or black tea, for 8 hours before bed. · Do not smoke or use other types of tobacco near bedtime. Nicotine is a stimulant and can keep you awake. · Avoid drinking alcohol late in the evening, because it can cause you to wake in the middle of the night. · Do not eat a big meal close to bedtime. If you are hungry, eat a light snack. · Do not drink a lot of water close to bedtime, because the need to urinate may wake you up during the night. · Do not read or watch TV in bed. Use the bed only for sleeping and sexual activity. What to try  · Go to bed at the same time every night, and wake up at the same time every morning. Do not take naps during the day. · Keep your bedroom quiet, dark, and cool. · Get regular exercise, but not within 3 to 4 hours of your bedtime. .  · Sleep on a comfortable pillow and mattress.   · If watching the clock makes you anxious, turn it facing away from you so you cannot see the time. · If you worry when you lie down, start a worry book. Well before bedtime, write down your worries, and then set the book and your concerns aside. · Try meditation or other relaxation techniques before you go to bed. · If you cannot fall asleep, get up and go to another room until you feel sleepy. Do something relaxing. Repeat your bedtime routine before you go to bed again. · Make your house quiet and calm about an hour before bedtime. Turn down the lights, turn off the TV, log off the computer, and turn down the volume on music. This can help you relax after a busy day. Drowsy Driving: The [facility] cites drowsiness as a causing factor in more than 580,179 police reported crashes annually, resulting in 76,000 injuries and 1,500 deaths. Other surveys suggest 55% of people polled have driven while drowsy in the past year, 23% had fallen asleep but not crashed, 3% crashed, and 2% had and accident due to drowsy driving. Who is at risk? Young Drivers: One study of drowsy driving accidents states that 55% of the drivers were under 25 years. Of those, 75% were male. Shift Workers and Travelers: People who work overnight or travel across time zones frequently are at higher risk of experiencing Circadian Rhythm Disorders. They are trying to work and function when their body is programed to sleep. Sleep Deprived: Lack of sleep has a serious impact on your ability to pay attention or focus on a task. Consistently getting less than the average of 8 hours your body needs creates partial or cumulative sleep deprivation. Untreated Sleep Disorders: Sleep Apnea, Narcolepsy, R.L.S., and other sleep disorders (untreated) prevent a person from getting enough restful sleep. This leads to excessive daytime sleepiness and increases the risk for drowsy driving accidents by up to 7 times.   Medications / Alcohol: Even over the counter medications can cause drowsiness. Medications that impair a drivers attention should have a warning label. Alcohol naturally makes you sleepy and on its own can cause accidents. Combined with excessive drowsiness its effects are amplified. Signs of Drowsy Driving:   * You don't remember driving the last few miles   * You may drift out of your francesca   * You are unable to focus and your thoughts wander   * You may yawn more often than normal   * You have difficulty keeping your eyes open / nodding off   * Missing traffic signs, speeding, or tailgating  Prevention-   Good sleep hygiene, lifestyle and behavioral choices have the most impact on drowsy driving. There is no substitute for sleep and the average person requires 8 hours nightly. If you find yourself driving drowsy, stop and sleep. Consider the sleep hygiene tips provided during your visit as well. Medication Refill Policy: Refills for all medications require 1 week advance notice. Please have your pharmacy fax a refill request. We are unable to fax, or call in \"controled substance\" medications and you will need to pick these prescriptions up from our office. [employer] Activation    Thank you for requesting access to [employer]. Please follow the instructions below to securely access and download your online medical record. [employer] allows you to send messages to your doctor, view your test results, renew your prescriptions, schedule appointments, and more. How Do I Sign Up? 1. In your internet browser, go to [email or web address]. 2. Click on the First Time User? Click Here link in the Sign In box. You will see the New Member Sign Up page. 3. Enter your [employer] Prime Access Code exactly as it appears below. You will not need to use this code after youve completed the sign-up process. If you do not sign up before the expiration date, you must request a new code. [employer] Access Code:  Activation code not generated  Current FoxyP2 Status: Active (This is the date your FoxyP2 access code will )    4. Enter the last four digits of your Social Security Number (xxxx) and Date of Birth (mm/dd/yyyy) as indicated and click Submit. You will be taken to the next sign-up page. 5. Create a Embarkt ID. This will be your FoxyP2 login ID and cannot be changed, so think of one that is secure and easy to remember. 6. Create a FoxyP2 password. You can change your password at any time. 7. Enter your Password Reset Question and Answer. This can be used at a later time if you forget your password. 8. Enter your e-mail address. You will receive e-mail notification when new information is available in [address] 9. Click Sign Up. You can now view and download portions of your medical record. 10. Click the Download Summary menu link to download a portable copy of your medical information. Additional Information    If you have questions, please call [phone]. Remember, FoxyP2 is NOT to be used for urgent needs. For medical emergencies, dial 911.

## 2022-01-07 ENCOUNTER — DOCUMENTATION ONLY (OUTPATIENT)
Dept: SLEEP MEDICINE | Age: 77
End: 2022-01-07

## 2022-01-07 NOTE — PROGRESS NOTES
Patient wanted to be with [facility] with . Confirmed change with patient and sent order for supplies to [facility]. Scheduled yearly.

## 2022-01-09 PROBLEM — F41.9 ANXIETY: Status: ACTIVE | Noted: 2017-09-25

## 2022-01-09 PROBLEM — M19.90 UNSPECIFIED OSTEOARTHRITIS, UNSPECIFIED SITE: Status: ACTIVE | Noted: 2022-01-09

## 2022-01-09 PROBLEM — E11.9 TYPE 2 DIABETES MELLITUS WITHOUT COMPLICATION, WITHOUT LONG-TERM CURRENT USE OF INSULIN (HCC): Status: RESOLVED | Noted: 2017-08-09 | Resolved: 2022-01-09

## 2022-01-09 NOTE — PROGRESS NOTES
Ms. Elvia Spangler is a 68 y.o. female who is here for evaluation of   Chief Complaint   Patient presents with   [facility] Annual Wellness Visit    Referral Request     Requests to discuss whether health maintance (Mammo, Pap etc) is still needed after 67yo    Hypertension   . ASSESSMENT AND PLAN:    1. Medicare annual wellness visit, subsequent  2. Chronic atrial fibrillation (HCC)  Followed by cardiology at [facility]. Therapeutically anticoagulated  3. Type 2 diabetes with nephropathy ([facility] [address]  She is due for labs  4. Acquired hypothyroidism  Appears euthyroid  5. Severe obesity ([facility] [address]  Diet discussed  6. Mixed hyperlipidemia  Reviewed labs. Orders Placed This Encounter    FLUAD QUAD (>65)    HEMOGLOBIN A1C WITH EAG     Standing Status:   Future     Standing Expiration Date:   2/4/2022    LIPID PANEL     Standing Status:   Future     Standing Expiration Date:   5/8/6075    METABOLIC PANEL, COMPREHENSIVE     Standing Status:   Future     Standing Expiration Date:   2/4/2022    CBC WITH AUTOMATED DIFF     Standing Status:   Future     Standing Expiration Date:   2/4/2022    TSH 3RD GENERATION     Standing Status:   Future     Standing Expiration Date:   2/4/2022    MICROALBUMIN, UR, RAND W/ MICROALB/CREAT RATIO     Standing Status:   Future     Standing Expiration Date:   1/28/2022     DIABETES FOOT EXAM     Standing Status:   Future     Standing Expiration Date:   2/4/2022    Influenza Admin ()    amLODIPine (NORVASC) 5 mg tablet     Sig: Take 1 Tablet by mouth daily. Dispense:  90 Tablet     Refill:  4    meclizine (ANTIVERT) 25 mg tablet     Sig: Take 1 Tablet by mouth three (3) times daily as needed for Dizziness.      Dispense:  60 Tablet     Refill:  2    amLODIPine (NORVASC) 2.5 mg tablet     Sig: TAKE 1 TABLET DAILY ALONG  WITH 5MG FOR TOTAL OF 7.5MGDAILY     Dispense:  90 Tablet     Refill:  3    apixaban (Eliquis) 5 mg tablet     Sig: TAKE 1 TABLET TWICE A DAY     Dispense:  180 Tablet Refill:  3    losartan-hydroCHLOROthiazide (HYZAAR) 100-25 mg per tablet     Sig: Take 1 Tablet by mouth nightly. Dispense:  90 Tablet     Refill:  3    metoprolol succinate (TOPROL-XL) 100 mg tablet     Sig: TAKE 1 TABLET DAILY FOR    HEART AND PRESSURE         (REPLACES METOPROLOL       TARTRATE)     Dispense:  90 Tablet     Refill:  3           HPI 51-year-old woman with a history of hypothyroidism, diabetes and mixed hyperlipidemia. Returns for a subsequent annual wellness visit and interval assessment of these medical problems. She is also due for labs. ROS:  Denies  fever, chills, cough, chest pain, SOB,  nausea, vomiting, or diarrhea. Denies wt loss, wt gain, hemoptysis, hematochezia or melena. Physical Examination:    Visit Vitals  BP (!) 162/89 (BP 1 Location: Left upper arm, BP Patient Position: Sitting, BP Cuff Size: Adult long)   Pulse 64   Temp 98 °F (36.7 °C) (Temporal)   Resp 16   Ht 5' 7\" (1.702 m)   Wt 189 lb 3.2 oz (85.8 kg)   SpO2 96%   BMI 29.63 kg/m²      General:  Alert, cooperative, no distress. Head:  Normocephalic, without obvious abnormality, atraumatic. Eyes:  Conjunctivae/corneas clear. Pupils equal, round, reactive to light. Extraocular movements intact. Lungs:   Clear to auscultation bilaterally. Chest wall:  No tenderness or deformity. Cardiac:  RRR 3/6 TOM over the aorta   Abdomen:   Soft, non-tender. Bowel sounds normal. No masses. No organomegaly. Extremities: Extremities normal, atraumatic, no cyanosis or edema. Pulses: 2+ and symmetric all extremities. Skin: Skin color, texture, turgor normal. No rashes or lesions. Lymph nodes: Cervical, supraclavicular, and axillary nodes normal.   Neurologic: CNII-XII intact. Normal strength, sensation, and reflexes throughout.      On this date 01/14/2022 I have spent 30 minutes reviewing previous notes, test results and face to face with the patient discussing the diagnosis and importance of compliance with the treatment plan as well as documenting on the day of the visit.     Bienvenido Rogers MD FACP    (signed electronically) on 1/14/2022 at 11:43 AM

## 2022-01-14 ENCOUNTER — OFFICE VISIT (OUTPATIENT)
Dept: FAMILY MEDICINE CLINIC | Age: 77
End: 2022-01-14
Payer: MEDICARE

## 2022-01-14 VITALS
DIASTOLIC BLOOD PRESSURE: 89 MMHG | SYSTOLIC BLOOD PRESSURE: 162 MMHG | BODY MASS INDEX: 29.7 KG/M2 | HEART RATE: 64 BPM | TEMPERATURE: 98 F | HEIGHT: 67 IN | OXYGEN SATURATION: 96 % | WEIGHT: 189.2 LBS | RESPIRATION RATE: 16 BRPM

## 2022-01-14 DIAGNOSIS — Z00.00 MEDICARE ANNUAL WELLNESS VISIT, SUBSEQUENT: Primary | ICD-10-CM

## 2022-01-14 DIAGNOSIS — E11.21 TYPE 2 DIABETES WITH NEPHROPATHY (HCC): ICD-10-CM

## 2022-01-14 DIAGNOSIS — E78.2 MIXED HYPERLIPIDEMIA: ICD-10-CM

## 2022-01-14 DIAGNOSIS — E66.01 SEVERE OBESITY (HCC): ICD-10-CM

## 2022-01-14 DIAGNOSIS — Z23 ENCOUNTER FOR IMMUNIZATION: ICD-10-CM

## 2022-01-14 DIAGNOSIS — E03.9 ACQUIRED HYPOTHYROIDISM: ICD-10-CM

## 2022-01-14 DIAGNOSIS — I48.20 CHRONIC ATRIAL FIBRILLATION (HCC): ICD-10-CM

## 2022-01-14 PROCEDURE — 90694 VACC AIIV4 NO PRSRV 0.5ML IM: CPT | Performed by: INTERNAL MEDICINE

## 2022-01-14 PROCEDURE — G0439 PPPS, SUBSEQ VISIT: HCPCS | Performed by: INTERNAL MEDICINE

## 2022-01-14 PROCEDURE — G0008 ADMIN INFLUENZA VIRUS VAC: HCPCS | Performed by: INTERNAL MEDICINE

## 2022-01-14 PROCEDURE — 99214 OFFICE O/P EST MOD 30 MIN: CPT | Performed by: INTERNAL MEDICINE

## 2022-01-14 RX ORDER — LOSARTAN POTASSIUM AND HYDROCHLOROTHIAZIDE 25; 100 MG/1; MG/1
1 TABLET ORAL
Qty: 90 TABLET | Refills: 3 | Status: SHIPPED | OUTPATIENT
Start: 2022-01-14

## 2022-01-14 RX ORDER — MECLIZINE HYDROCHLORIDE 25 MG/1
25 TABLET ORAL
Qty: 60 TABLET | Refills: 2 | Status: SHIPPED | OUTPATIENT
Start: 2022-01-14

## 2022-01-14 RX ORDER — AMLODIPINE BESYLATE 2.5 MG/1
TABLET ORAL
Qty: 90 TABLET | Refills: 3 | Status: SHIPPED | OUTPATIENT
Start: 2022-01-14

## 2022-01-14 RX ORDER — AMLODIPINE BESYLATE 5 MG/1
5 TABLET ORAL DAILY
Qty: 90 TABLET | Refills: 4 | Status: SHIPPED | OUTPATIENT
Start: 2022-01-14

## 2022-01-14 RX ORDER — METOPROLOL SUCCINATE 100 MG/1
TABLET, EXTENDED RELEASE ORAL
Qty: 90 TABLET | Refills: 3 | Status: SHIPPED | OUTPATIENT
Start: 2022-01-14

## 2022-01-14 NOTE — PATIENT INSTRUCTIONS
Medicare Wellness Visit, Female     The best way to live healthy is to have a lifestyle where you eat a well-balanced diet, exercise regularly, limit alcohol use, and quit all forms of tobacco/nicotine, if applicable. Regular preventive services are another way to keep healthy. Preventive services (vaccines, screening tests, monitoring & exams) can help personalize your care plan, which helps you manage your own care. Screening tests can find health problems at the earliest stages, when they are easiest to treat. [facility] follows the current, evidence-based guidelines published by the [facility] Jere Goldberg ([facility]) when recommending preventive services for our patients. Because we follow these guidelines, sometimes recommendations change over time as research supports it. (For example, mammograms used to be recommended annually. Even though Medicare will still pay for an annual mammogram, the newer guidelines recommend a mammogram every two years for women of average risk). Of course, you and your doctor may decide to screen more often for some diseases, based on your risk and your co-morbidities (chronic disease you are already diagnosed with). Preventive services for you include:  - Medicare offers their members a free annual wellness visit, which is time for you and your primary care provider to discuss and plan for your preventive service needs. Take advantage of this benefit every year!  -All adults over the age of 72 should receive the recommended pneumonia vaccines. Current USPSTF guidelines recommend a series of two vaccines for the best pneumonia protection.   -All adults should have a flu vaccine yearly and a tetanus vaccine every 10 years.   -All adults age 48 and older should receive the shingles vaccines (series of two vaccines).       -All adults age 38-68 who are overweight should have a diabetes screening test once every three years.   -All adults born between [birth year] and 1965 should be screened once for Hepatitis C.  -Other screening tests and preventive services for persons with diabetes include: an eye exam to screen for diabetic retinopathy, a kidney function test, a foot exam, and stricter control over your cholesterol.   -Cardiovascular screening for adults with routine risk involves an electrocardiogram (ECG) at intervals determined by your doctor.   -Colorectal cancer screenings should be done for adults age 54-65 with no increased risk factors for colorectal cancer. There are a number of acceptable methods of screening for this type of cancer. Each test has its own benefits and drawbacks. Discuss with your doctor what is most appropriate for you during your annual wellness visit. The different tests include: colonoscopy (considered the best screening method), a fecal occult blood test, a fecal DNA test, and sigmoidoscopy.    -A bone mass density test is recommended when a woman turns 65 to screen for osteoporosis. This test is only recommended one time, as a screening. Some providers will use this same test as a disease monitoring tool if you already have osteoporosis. -Breast cancer screenings are recommended every other year for women of normal risk, age 54-69.  -Cervical cancer screenings for women over age 72 are only recommended with certain risk factors.      Here is a list of your current Health Maintenance items (your personalized list of preventive services) with a due date:  Health Maintenance Due   Topic Date Due    Albumin Urine Test  07/07/2021    Yearly Flu Vaccine (1) 09/01/2021    Eye Exam  09/23/2021    Diabetic Foot Care  10/19/2021

## 2022-01-14 NOTE — PROGRESS NOTES
Tanai Balderas is a 68 y.o. female presenting for/with:    Chief Complaint   Patient presents with    Annual Wellness Visit    Referral Request     Requests to discuss whether health maintance (Mammo, Pap etc) is still needed after 67yo    Hypertension       . Visit Vitals  BP (!) 162/89 (BP 1 Location: Left upper arm, BP Patient Position: Sitting, BP Cuff Size: Adult long)   Pulse 64   Temp 98 °F (36.7 °C) (Temporal)   Resp 16   Ht 5' 7\" (1.702 m)   Wt 189 lb 3.2 oz (85.8 kg)   SpO2 96%   BMI 29.63 kg/m²       Pain Scale: /10  Pain Location:     1. Have you been to the ER, urgent care clinic since your last visit? Hospitalized since your last visit? NO    2. Have you seen or consulted any other health care providers outside of the [address] [place] [address] since your last visit? Include any pap smears or colon screening. NO    Symptom review:  NO  Fever   NO  Shaking chills  NO  Cough  NO  Body aches  NO  Coughing up blood  NO  Chest congestion  NO  Chest pain  NO  Shortness of breath  NO  Profound Loss of smell/taste  NO  Nausea/Vomiting   NO  Loose stool/Diarrhea  NO  any skin issues    Patient Risk Factors Reviewed as follows:  NO  have you been in Close contact with confirmed COVID19 patient   NO  History of recent travel to affected geographical areas within the past 14 days  NO  COPD  NO  Active Cancer/Leukemia/Lymphoma/Chemotherapy  NO  Oral steroid use  NO  Pregnant  NO  Diabetes Mellitus  YES  Heart disease  NO  Asthma  NO Health care worker at home  [address] care worker  NO Is there a Pregnant Woman in the home  NO Dialysis pt in the home   NO a large number of people living in the home    Learning Assessment 1/14/2022   PRIMARY LEARNER Patient   [facility]   LEARNER PREFERENCE PRIMARY READING     -   ANSWERED BY patient   RELATIONSHIP SELF     Fall Risk Assessment, last 12 mths 1/14/2022   Able to walk?  Yes   Fall in past 15 months? 0   Do you feel unsteady? 0   Are you worried about falling 0   Number of falls in past 12 months -   Fall with injury? -       3 most recent PHQ Screens 1/14/2022   Little interest or pleasure in doing things Not at all   Feeling down, depressed, irritable, or hopeless Not at all   Total Score PHQ 2 0     Abuse Screening Questionnaire 1/14/2022   Do you ever feel afraid of your partner? N   Are you in a relationship with someone who physically or mentally threatens you? N   Is it safe for you to go home? Y       ADL Assessment 1/14/2022   Feeding yourself No Help Needed   Getting from bed to chair No Help Needed   Getting dressed No Help Needed   Bathing or showering No Help Needed   Walk across the room (includes cane/walker) No Help Needed   Using the telphone No Help Needed   Taking your medications No Help Needed   Preparing meals No Help Needed   Managing money (expenses/bills) No Help Needed   Moderately strenuous housework (laundry) No Help Needed   Shopping for personal items (toiletries/medicines) No Help Needed   Shopping for groceries No Help Needed   Driving No Help Needed   Climbing a flight of stairs No Help Needed   Getting to places beyond walking distances No Help Needed      Advance Care Planning 4/15/2021   Confirm Advance Directive None   Patient Would Like to Complete Advance Directive Yes        This is the Subsequent Medicare Annual Wellness Exam, performed 12 months or more after the Initial AWV or the last Subsequent AWV    I have reviewed the patient's medical history in detail and updated the computerized patient record. Assessment/Plan   Education and counseling provided:  Are appropriate based on today's review and evaluation  Pneumococcal Vaccine  Influenza Vaccine    1. Medicare annual wellness visit, subsequent  2. Chronic atrial fibrillation (HCC)  3. Type 2 diabetes with nephropathy ([facility]  4. Acquired hypothyroidism  5. Severe obesity ([facility]  6.  Mixed hyperlipidemia  7. Encounter for immunization  -     FLU (FLUAD QUAD INFLUENZA VACCINE,QUAD,ADJUVANTED)  -     ADMIN INFLUENZA VIRUS VAC       Depression Risk Factor Screening     3 most recent PHQ Screens 1/14/2022   Little interest or pleasure in doing things Not at all   Feeling down, depressed, irritable, or hopeless Not at all   Total Score PHQ 2 0       Alcohol & Drug Abuse Risk Screen    Do you average more than 1 drink per night or more than 7 drinks a week:  No    On any one occasion in the past three months have you have had more than 3 drinks containing alcohol:  No          Functional Ability and Level of Safety    Hearing: Hearing is good. Activities of Daily Living: The home contains: no safety equipment. Patient does total self care      Ambulation: with no difficulty     Fall Risk:  Fall Risk Assessment, last 12 mths 1/14/2022   Able to walk? Yes   Fall in past 12 months? 0   Do you feel unsteady? 0   Are you worried about falling 0   Number of falls in past 12 months -   Fall with injury?  -      Abuse Screen:  Patient is not abused       Cognitive Screening    Has your family/caregiver stated any concerns about your memory: no       Health Maintenance Due     Health Maintenance Due   Topic Date Due    MICROALBUMIN Q1  07/07/2021    Flu Vaccine (1) 09/01/2021    Eye Exam Retinal or Dilated  09/23/2021    Foot Exam Q1  10/19/2021       Patient Care Team   Patient Care Team:  Braden Segura MD as PCP - General (Internal Medicine)  Braden Segura MD as PCP - [facility]    History     Patient Active Problem List   Diagnosis Code    Hyperlipidemia E78.5    Chronic atrial fibrillation ([address] I48.20    Cholecystitis K81.9    Aortic systolic murmur on examination I35.8    Hypothyroidism due to acquired atrophy of thyroid E03.4    Essential hypertension I10    Type 2 diabetes with nephropathy ([address] E11.21    Severe obesity ([address] E66.01    Anxiety F41.9    Auditory vertigo H81.319    GERD (gastroesophageal reflux disease) K21.9    Obstructive sleep apnea G47.33    Unspecified osteoarthritis, unspecified site M19.90     Past Medical History:   Diagnosis Date    Atrial fibrillation ([facility]     Cholecystitis July 6, 2016    Diabetes [facility])     Dizziness     GERD (gastroesophageal reflux disease) 3/26/2014    Hyperlipidemia     Hypertension     Hypothyroidism     OA (osteoarthritis) of knee     Pain, wrist     Carpal tunnel?  Severe obesity ([facility] [facility] 4/5/2019    Unspecified osteoarthritis, unspecified site 1/9/2022    Unspecified sleep apnea     CPAP mask      Past Surgical History:   Procedure Laterality Date    HX AFIB ABLATION      HX APPENDECTOMY      HX BREAST LUMPECTOMY  1971    Right breast     HX CHOLECYSTECTOMY  2016    HX HYSTERECTOMY      HX TONSIL AND ADENOIDECTOMY      CO COLONOSCOPY FLX DX W/COLLJ SPEC WHEN PFRMD  11/14/2013          Current Outpatient Medications   Medication Sig Dispense Refill    metFORMIN (GLUCOPHAGE) 500 mg tablet TAKE 1 TABLET 3 TIMES DAILYWITH MEALS 270 Tablet 3    Synthroid 125 mcg tablet TAKE 1 TABLET DAILY BEFORE BREAKFAST 90 Tablet 3    rosuvastatin (CRESTOR) 10 mg tablet TAKE 1 TABLET NIGHTLY 90 Tablet 3    Eliquis 5 mg tablet TAKE 1 TABLET TWICE A  Tablet 3    amLODIPine (NORVASC) 2.5 mg tablet TAKE 1 TABLET DAILY ALONG  WITH 5MG FOR TOTAL OF 7.5MGDAILY 90 Tablet 3    metoprolol succinate (TOPROL-XL) 100 mg tablet TAKE 1 TABLET DAILY FOR    HEART AND PRESSURE         (REPLACES METOPROLOL       TARTRATE) 90 Tab 3    losartan-hydroCHLOROthiazide (HYZAAR) 100-25 mg per tablet TAKE 1 TABLET DAILY 90 Tab 3    glucose blood VI test strips (OneTouch Verio test strips) strip USE 1 STRIP TO CHECK GLUCOSE ONCE DAILY 100 Strip 4    meclizine (ANTIVERT) 25 mg tablet Take 1 Tab by mouth three (3) times daily as needed for Dizziness. 60 Tab 2    amLODIPine (NORVASC) 5 mg tablet Take 1 Tab by mouth daily.  90 Tab 4    lancets (One Touch Delica) 33 gauge [facility] USE 1 LANCET EACH TIME TO TEST BLOOD SUGAR DAILY 100 Lancet 5    glucose blood VI test strips (OneTouch Ultra Test) strip by Does Not Apply route daily. Use to test glucose daily E 11.9 100 Strip 4    BLOOD SUGAR DIAGNOSTIC (ONE TOUCH TEST) by Does Not Apply route daily. Indications: One Touch Verioflex Meter      therapeutic multivitamin (THERAGRAN) tablet Take 1 Tab by mouth daily. Allergies   Allergen Reactions    Penicillins Unknown (comments)       Family History   Problem Relation Age of Onset    Cancer Father         lung    Diabetes Mother      Social History     Tobacco Use    Smoking status: Never Smoker    Smokeless tobacco: Never Used   Substance Use Topics    Alcohol use: No     Alcohol/week: 0.0 standard drinks       After obtaining consent, and per orders of Dr. Coel Marin, injection of Fluad to (R) deltoid given by Deon Masters. Patient instructed to remain in clinic for 20 minutes afterwards, and to report any adverse reaction to me immediately.     Deon Masters

## 2022-01-15 LAB
ALBUMIN SERPL-MCNC: 4.5 G/DL (ref 3.5–5)
ALBUMIN/GLOB SERPL: 1.3 {RATIO} (ref 1.1–2.2)
ALP SERPL-CCNC: 93 U/L (ref 45–117)
ALT SERPL-CCNC: 20 U/L (ref 12–78)
ANION GAP SERPL CALC-SCNC: 5 MMOL/L (ref 5–15)
AST SERPL-CCNC: 18 U/L (ref 15–37)
BASOPHILS # BLD: 0.1 K/UL (ref 0–0.1)
BASOPHILS NFR BLD: 1 % (ref 0–1)
BILIRUB SERPL-MCNC: 0.5 MG/DL (ref 0.2–1)
BUN SERPL-MCNC: 18 MG/DL (ref 6–20)
BUN/CREAT SERPL: 19 (ref 12–20)
CALCIUM SERPL-MCNC: 10.1 MG/DL (ref 8.5–10.1)
CHLORIDE SERPL-SCNC: 102 MMOL/L (ref 97–108)
CHOLEST SERPL-MCNC: 200 MG/DL
CO2 SERPL-SCNC: 30 MMOL/L (ref 21–32)
CREAT SERPL-MCNC: 0.95 MG/DL (ref 0.55–1.02)
CREAT UR-MCNC: 104 MG/DL
DIFFERENTIAL METHOD BLD: NORMAL
EOSINOPHIL # BLD: 0.3 K/UL (ref 0–0.4)
EOSINOPHIL NFR BLD: 4 % (ref 0–7)
ERYTHROCYTE [DISTWIDTH] IN BLOOD BY AUTOMATED COUNT: 12.6 % (ref 11.5–14.5)
EST. AVERAGE GLUCOSE BLD GHB EST-MCNC: 166 MG/DL
GLOBULIN SER CALC-MCNC: 3.4 G/DL (ref 2–4)
GLUCOSE SERPL-MCNC: 157 MG/DL (ref 65–100)
HBA1C MFR BLD: 7.4 % (ref 4–5.6)
HCT VFR BLD AUTO: 40.2 % (ref 35–47)
HDLC SERPL-MCNC: 66 MG/DL
HDLC SERPL: 3 {RATIO} (ref 0–5)
HGB BLD-MCNC: 12.9 G/DL (ref 11.5–16)
IMM GRANULOCYTES # BLD AUTO: 0 K/UL (ref 0–0.04)
IMM GRANULOCYTES NFR BLD AUTO: 0 % (ref 0–0.5)
LDLC SERPL CALC-MCNC: 104.4 MG/DL (ref 0–100)
LYMPHOCYTES # BLD: 2.7 K/UL (ref 0.8–3.5)
LYMPHOCYTES NFR BLD: 36 % (ref 12–49)
MCH RBC QN AUTO: 30.1 PG (ref 26–34)
MCHC RBC AUTO-ENTMCNC: 32.1 G/DL (ref 30–36.5)
MCV RBC AUTO: 93.7 FL (ref 80–99)
MICROALBUMIN UR-MCNC: 7.68 MG/DL
MICROALBUMIN/CREAT UR-RTO: 74 MG/G (ref 0–30)
MONOCYTES # BLD: 0.7 K/UL (ref 0–1)
MONOCYTES NFR BLD: 10 % (ref 5–13)
NEUTS SEG # BLD: 3.7 K/UL (ref 1.8–8)
NEUTS SEG NFR BLD: 49 % (ref 32–75)
NRBC # BLD: 0 K/UL (ref 0–0.01)
NRBC BLD-RTO: 0 PER 100 WBC
PLATELET # BLD AUTO: 288 K/UL (ref 150–400)
PMV BLD AUTO: 11.1 FL (ref 8.9–12.9)
POTASSIUM SERPL-SCNC: 4 MMOL/L (ref 3.5–5.1)
PROT SERPL-MCNC: 7.9 G/DL (ref 6.4–8.2)
RBC # BLD AUTO: 4.29 M/UL (ref 3.8–5.2)
SODIUM SERPL-SCNC: 137 MMOL/L (ref 136–145)
TRIGL SERPL-MCNC: 148 MG/DL (ref ?–150)
TSH SERPL DL<=0.05 MIU/L-ACNC: 0.14 UIU/ML (ref 0.36–3.74)
VLDLC SERPL CALC-MCNC: 29.6 MG/DL
WBC # BLD AUTO: 7.5 K/UL (ref 3.6–11)

## 2022-01-16 DIAGNOSIS — E03.9 ACQUIRED HYPOTHYROIDISM: Primary | ICD-10-CM

## 2022-01-23 NOTE — PROGRESS NOTES
Ms. Himanshu Brady is a 68 y.o. female who is here for evaluation of   Chief Complaint   Patient presents with    Irregular Heart Beat     Saw Farrah Taveras yesterday. Echo and EKG completed. Was told she may need to have a valve replacement. Report printed    Labs     Here for a followup of abnormal TSH. States has not had any biotin since ABN lab   . ASSESSMENT AND PLAN:    1. Acquired hypothyroidism  Presently off biotin. Repeating labs today  - T4, FREE; Future  - T4 (THYROXINE); Future  - TSH 3RD GENERATION; Future  - TSH 3RD GENERATION  - T4 (THYROXINE)  - T4, FREE    2. Primary hypertension  Continue to monitor blood pressure. 3. Aortic valve stenosis, etiology of cardiac valve disease unspecified  Echocardiogram at [facility] demonstrated an aortic valve area of 1 cm². Reviewed with Dr. Farrah Taveras. May need TAVR in a year or 2. Currently feels well. Orders Placed This Encounter    T4, FREE     Standing Status:   Future     Number of Occurrences:   1     Standing Expiration Date:   2/4/2022    T4 (THYROXINE)     Standing Status:   Future     Number of Occurrences:   1     Standing Expiration Date:   2/4/2022    TSH 3RD GENERATION     Standing Status:   Future     Number of Occurrences:   1     Standing Expiration Date:   2/4/2022           HPI   66-year-old woman with atrial fibrillation and hypertension who was seen 2 weeks ago and was found to be hypertensive in addition to having a suppressed TSH while on thyroid replacement therapy. Follow-up telephone visit revealed that she had been taking biotin for several weeks and arrangements were made for her to return to clinic today for reassessment of her thyroid and blood pressure. She has not been taking the biotin since 16 January. Seen at [facility] yesterday by Dr. Farrah Taveras. Echocardiogram demonstrated aortic valve area of 1 cm². Mean gradient 17 mmHg. ROS:  Denies  fever, chills, cough, chest pain, SOB,  nausea, vomiting, or diarrhea. Denies wt loss, wt gain, hemoptysis, hematochezia or melena. Physical Examination:    Visit Vitals  BP (!) 158/52 (BP 1 Location: Left upper arm, BP Patient Position: Sitting, BP Cuff Size: Adult long)   Pulse 63   Temp 98.2 °F (36.8 °C) (Temporal)   Resp 18   Ht 5' 7\" (1.702 m)   Wt 188 lb 6.4 oz (85.5 kg)   SpO2 98%   BMI 29.51 kg/m²      General:  Alert, cooperative, no distress. Head:  Normocephalic, without obvious abnormality, atraumatic. Eyes:  Conjunctivae/corneas clear. Pupils equal, round, reactive to light. Extraocular movements intact. Lungs:   Clear to auscultation bilaterally. Chest wall:  No tenderness or deformity. Cardiac:  RRR 3/6 TOM   Abdomen:   Soft, non-tender. Bowel sounds normal. No masses. No organomegaly. Extremities: Extremities normal, atraumatic, no cyanosis or edema. Pulses: 2+ and symmetric all extremities. Skin: Skin color, texture, turgor normal. No rashes or lesions. Lymph nodes: Cervical, supraclavicular, and axillary nodes normal.   Neurologic: CNII-XII intact. Normal strength, sensation, and reflexes throughout. On this date 01/28/2022 I have spent 30 minutes reviewing previous notes, test results and face to face with the patient discussing the diagnosis and importance of compliance with the treatment plan as well as documenting on the day of the visit.     Connie Snider MD FACP    (signed electronically) on 1/28/2022 at 4:19 PM

## 2022-01-27 ENCOUNTER — TELEPHONE (OUTPATIENT)
Dept: SLEEP MEDICINE | Age: 77
End: 2022-01-27

## 2022-01-27 NOTE — TELEPHONE ENCOUNTER
Patient requested that we resend the supply order to her DME company, [facility] [employer] [facility], as they have canceled the previous order due to authorization issues. The patient also requested the order to be amended to include additional supplies per shipment. After reviewing her insurance coverage, per Medicare she is only allotted the one unit per item. Patient was called back to make her aware of this information.

## 2022-01-28 ENCOUNTER — OFFICE VISIT (OUTPATIENT)
Dept: FAMILY MEDICINE CLINIC | Age: 77
End: 2022-01-28
Payer: MEDICARE

## 2022-01-28 VITALS
HEART RATE: 63 BPM | TEMPERATURE: 98.2 F | OXYGEN SATURATION: 98 % | RESPIRATION RATE: 18 BRPM | BODY MASS INDEX: 29.57 KG/M2 | SYSTOLIC BLOOD PRESSURE: 158 MMHG | DIASTOLIC BLOOD PRESSURE: 52 MMHG | WEIGHT: 188.4 LBS | HEIGHT: 67 IN

## 2022-01-28 DIAGNOSIS — I10 PRIMARY HYPERTENSION: ICD-10-CM

## 2022-01-28 DIAGNOSIS — I35.0 AORTIC VALVE STENOSIS, ETIOLOGY OF CARDIAC VALVE DISEASE UNSPECIFIED: ICD-10-CM

## 2022-01-28 DIAGNOSIS — E03.9 ACQUIRED HYPOTHYROIDISM: Primary | ICD-10-CM

## 2022-01-28 PROCEDURE — 99214 OFFICE O/P EST MOD 30 MIN: CPT | Performed by: INTERNAL MEDICINE

## 2022-01-28 NOTE — PROGRESS NOTES
Daisy Lam is a 68 y.o. female presenting for/with:    Chief Complaint   Patient presents with    Irregular Heart Beat     Saw Anali Nova yesterday. Echo and EKG completed. Was told she may need to have a valve replacement. Report printed    Labs     Here for a followup of abnormal TSH. States has not had any biotin since ABN lab       Visit Vitals  BP (!) 158/52 (BP 1 Location: Left upper arm, BP Patient Position: Sitting, BP Cuff Size: Adult long)   Pulse 63   Temp 98.2 °F (36.8 °C) (Temporal)   Resp 18   Ht 5' 7\" (1.702 m)   Wt 188 lb 6.4 oz (85.5 kg)   SpO2 98%   BMI 29.51 kg/m²     Pain Scale: 0 - No pain/10  Pain Location:     1. Have you been to the ER, urgent care clinic since your last visit? Hospitalized since your last visit? NO    2. Have you seen or consulted any other health care providers outside of the [address] [place] [address] since your last visit? Include any pap smears or colon screening.  NO    Symptom review:  NO  Fever   NO  Shaking chills  NO  Cough  NO  Body aches  NO  Coughing up blood  NO  Chest congestion  NO  Chest pain  NO  Shortness of breath  NO  Profound Loss of smell/taste  NO  Nausea/Vomiting   NO  Loose stool/Diarrhea  NO  any skin issues    Patient Risk Factors Reviewed as follows:  NO  have you been in Close contact with confirmed COVID19 patient   NO  History of recent travel to affected geographical areas within the past 14 days  NO  COPD  NO  Active Cancer/Leukemia/Lymphoma/Chemotherapy  NO  Oral steroid use  NO  Pregnant  YES  Diabetes Mellitus  YES  Heart disease  NO  Asthma  NO Health care worker at home  [address] care worker  NO Is there a Pregnant Woman in the home  NO Dialysis pt in the home   NO a large number of people living in the home    Learning Assessment 1/14/2022   PRIMARY LEARNER Patient   BARRIERS PRIMARY LEARNER -   CO-LEARNER CAREGIVER -   PRIMARY LANGUAGE ENGLISH   LEARNER PREFERENCE PRIMARY READING     -   ANSWERED BY patient   RELATIONSHIP SELF Fall Risk Assessment, last 12 mths 1/14/2022   Able to walk? Yes   Fall in past 12 months? 0   Do you feel unsteady? 0   Are you worried about falling 0   Number of falls in past 12 months -   Fall with injury? -       3 most recent PHQ Screens 1/14/2022   Little interest or pleasure in doing things Not at all   Feeling down, depressed, irritable, or hopeless Not at all   Total Score PHQ 2 0     Abuse Screening Questionnaire 1/14/2022   Do you ever feel afraid of your partner? N   Are you in a relationship with someone who physically or mentally threatens you? N   Is it safe for you to go home?  Y       ADL Assessment 1/14/2022   Feeding yourself No Help Needed   Getting from bed to chair No Help Needed   Getting dressed No Help Needed   Bathing or showering No Help Needed   Walk across the room (includes cane/walker) No Help Needed   Using the telphone No Help Needed   Taking your medications No Help Needed   Preparing meals No Help Needed   Managing money (expenses/bills) No Help Needed   Moderately strenuous housework (laundry) No Help Needed   Shopping for personal items (toiletries/medicines) No Help Needed   Shopping for groceries No Help Needed   Driving No Help Needed   Climbing a flight of stairs No Help Needed   Getting to places beyond walking distances No Help Needed      Advance Care Planning 4/15/2021   Confirm Advance Directive None   Patient Would Like to Complete Advance Directive Yes

## 2022-01-29 LAB
T4 FREE SERPL-MCNC: 1.5 NG/DL (ref 0.8–1.5)
T4 SERPL-MCNC: 14.3 UG/DL (ref 4.8–13.9)
TSH SERPL DL<=0.05 MIU/L-ACNC: 0.06 UIU/ML (ref 0.36–3.74)

## 2022-01-30 DIAGNOSIS — E03.9 ACQUIRED HYPOTHYROIDISM: Primary | ICD-10-CM

## 2022-01-30 RX ORDER — LEVOTHYROXINE SODIUM 112 UG/1
112 TABLET ORAL
Qty: 90 TABLET | Refills: 4 | Status: SHIPPED | OUTPATIENT
Start: 2022-01-30 | End: 2022-03-15 | Stop reason: SDUPTHER

## 2022-01-30 NOTE — PROGRESS NOTES
Patient notified by phone of lab results. Currently she takes 125 mcg of Synthroid. Based on her laboratory values today she is over replaced. We will decrease to 112 mcg a day and check the TSH and total T4 again in about 8 weeks which would be around the first week in April 2022. Also communicated the information from [facility] Dr. Tabitha Martinez who said that her aortic valve area was 1 cm² and perhaps within a year or 2 she may need a TAVR

## 2022-02-21 ENCOUNTER — COMPLETE EYE EXAM (OUTPATIENT)
Dept: URBAN - METROPOLITAN AREA CLINIC 34 | Facility: CLINIC | Age: 77
End: 2022-02-21

## 2022-02-21 DIAGNOSIS — H52.4: ICD-10-CM

## 2022-02-21 DIAGNOSIS — H52.13: ICD-10-CM

## 2022-02-21 DIAGNOSIS — H25.813: ICD-10-CM

## 2022-02-21 DIAGNOSIS — E11.9: ICD-10-CM

## 2022-02-21 PROCEDURE — 92014 COMPRE OPH EXAM EST PT 1/>: CPT

## 2022-02-21 PROCEDURE — 92015 DETERMINE REFRACTIVE STATE: CPT

## 2022-02-21 ASSESSMENT — VISUAL ACUITY
OD_CC: 20/20
OU_SC: J1
OS_GLARE: 20/80
OD_GLARE: 20/60
OS_CC: 20/20

## 2022-02-21 ASSESSMENT — TONOMETRY
OD_IOP_MMHG: 16
OS_IOP_MMHG: 16

## 2022-03-15 ENCOUNTER — HOSPITAL ENCOUNTER (OUTPATIENT)
Dept: GENERAL RADIOLOGY | Age: 77
Discharge: HOME | End: 2022-03-15
Payer: MEDICARE

## 2022-03-15 ENCOUNTER — OFFICE VISIT (OUTPATIENT)
Dept: FAMILY MEDICINE CLINIC | Age: 77
End: 2022-03-15
Payer: MEDICARE

## 2022-03-15 VITALS
OXYGEN SATURATION: 98 % | HEIGHT: 67 IN | SYSTOLIC BLOOD PRESSURE: 122 MMHG | RESPIRATION RATE: 18 BRPM | TEMPERATURE: 98.6 F | BODY MASS INDEX: 29.47 KG/M2 | DIASTOLIC BLOOD PRESSURE: 60 MMHG | WEIGHT: 187.8 LBS | HEART RATE: 67 BPM

## 2022-03-15 DIAGNOSIS — M25.562 CHRONIC PAIN OF LEFT KNEE: Primary | ICD-10-CM

## 2022-03-15 DIAGNOSIS — G89.29 CHRONIC PAIN OF LEFT KNEE: Primary | ICD-10-CM

## 2022-03-15 DIAGNOSIS — E03.9 ACQUIRED HYPOTHYROIDISM: ICD-10-CM

## 2022-03-15 DIAGNOSIS — M25.562 CHRONIC PAIN OF LEFT KNEE: ICD-10-CM

## 2022-03-15 DIAGNOSIS — G89.29 CHRONIC PAIN OF LEFT KNEE: ICD-10-CM

## 2022-03-15 PROCEDURE — 99214 OFFICE O/P EST MOD 30 MIN: CPT | Performed by: NURSE PRACTITIONER

## 2022-03-15 PROCEDURE — 73562 X-RAY EXAM OF KNEE 3: CPT

## 2022-03-15 RX ORDER — LEVOTHYROXINE SODIUM 112 UG/1
112 TABLET ORAL
Qty: 90 TABLET | Refills: 4 | Status: SHIPPED | OUTPATIENT
Start: 2022-03-15

## 2022-03-15 NOTE — PROGRESS NOTES
Sobia Junior is a 68 y.o. female presenting for/with:    Chief Complaint   Patient presents with   Dalia Singh Fall     fell out of a chair in a restaurant back in february. Has had constant L knee pain since. Has been icing/ elevating area as well. Visit Vitals  /60 (BP 1 Location: Left arm, BP Patient Position: Sitting, BP Cuff Size: Adult long)   Pulse 67   Temp 98.6 °F (37 °C) (Temporal)   Resp 18   Ht 5' 7\" (1.702 m)   Wt 187 lb 12.8 oz (85.2 kg)   SpO2 98%   BMI 29.41 kg/m²     Pain Scale: 3/10  Pain Location: Knee (left )      3 most recent PHQ Screens 3/15/2022   Little interest or pleasure in doing things Several days   Feeling down, depressed, irritable, or hopeless Several days   Total Score PHQ 2 2     Learning Assessment 3/15/2022   PRIMARY LEARNER Patient   BARRIERS PRIMARY LEARNER -   CO-LEARNER CAREGIVER -   PRIMARY LANGUAGE ENGLISH   LEARNER PREFERENCE PRIMARY READING     -   ANSWERED BY patient   RELATIONSHIP SELF     Fall Risk Assessment, last 12 mths 3/15/2022   Able to walk? Yes   Fall in past 12 months? 1   Do you feel unsteady? 0   Are you worried about falling 0   Is TUG test greater than 12 seconds? 0   Is the gait abnormal? 0   Number of falls in past 12 months 1   Fall with injury? 1     Abuse Screening Questionnaire 3/15/2022   Do you ever feel afraid of your partner? N   Are you in a relationship with someone who physically or mentally threatens you? N   Is it safe for you to go home?  Y     ADL Assessment 3/15/2022   Feeding yourself No Help Needed   Getting from bed to chair No Help Needed   Getting dressed No Help Needed   Bathing or showering No Help Needed   Walk across the room (includes cane/walker) No Help Needed   Using the telphone No Help Needed   Taking your medications No Help Needed   Preparing meals No Help Needed   Managing money (expenses/bills) No Help Needed   Moderately strenuous housework (laundry) No Help Needed   Shopping for personal items (toiletries/medicines) No Help Needed   Shopping for groceries No Help Needed   Driving No Help Needed   Climbing a flight of stairs No Help Needed   Getting to places beyond walking distances No Help Needed       1. \"Have you been to the ER, urgent care clinic since your last visit? Hospitalized since your last visit? \" No    2. \"Have you seen or consulted any other health care providers outside of the [address] [place] [address] [place] since your last visit? \" No     3. For patients aged 39-70: Has the patient had a colonoscopy / FIT/ Cologuard? Yes - Care Gap present. Most recent result on file      If the patient is female:    4. For patients aged 41-77: Has the patient had a mammogram within the past 2 years? Yes - Care Gap present. Most recent result on file      5. For patients aged 21-65: Has the patient had a pap smear? Yes - Care Gap present. Most recent result on file          Symptom review:    NO  Fever   NO  Shaking chills  NO  Cough  NO  Body aches  NO  Coughing up blood  NO  Chest congestion  NO  Chest pain  NO  Shortness of breath  NO  Profound Loss of smell/taste  NO  Nausea/Vomiting   NO  Loose stool/Diarrhea  NO  any skin issues    Patient Risk Factors Reviewed as follows:  NO  have you been in Close contact with confirmed COVID19 patient   NO  History of recent travel to affected geographical areas within the past 14 days  NO  COPD  NO  Active Cancer/Leukemia/Lymphoma/Chemotherapy  NO  Oral steroid use  NO  Pregnant  NO  Diabetes Mellitus  NO  Heart disease  NO  Asthma  NO Health care worker at home  NO Health care worker  NO Is there a Pregnant Woman in the home  NO Dialysis pt in the home   NO a large number of people living in the home  Recent Travel Screening and Travel History documentation     Travel Screening     Question   Response    In the last month, have you been in contact with someone who was confirmed or suspected to have Coronavirus / COVID-19?   No / Unsure    Have you had a COVID-19 viral test in the last 14 days? No    Do you have any of the following new or worsening symptoms? None of these    Have you traveled internationally or domestically in the last month?   No      Travel History   Travel since 02/15/22    No documented travel since 02/15/22               Advance Care Planning 3/15/2022   Patient's Healthcare Decision Maker is: Legal Next of Kin   Confirm Advance Directive None   Patient Would Like to Complete Advance Directive No

## 2022-03-15 NOTE — PROGRESS NOTES
Chief Complaint   Patient presents with   Sandra Smith     fell out of a chair in a restaurant back in february. Has had constant L knee pain since. Has been icing/ elevating area as well. HPI:       is a 68 y.o. female. Follows with Dr. Shakir Julian for her chronic issues. She has a past medical history of Atrial fibrillation, Cholecystitis (July 6, 2016), Diabetes, Dizziness, GERD, Hyperlipidemia, Hypertension, Hypothyroidism, OA of knee and Unspecified sleep apnea. New Issues:  She is here for left knee pain. She stood up from a chair in a restaurant back in February. She caught her right foot and fell forward. Caught herself with her hands and hit her left knee. Ever since she has had constant knee pain. She has used ice and elevation. Pain is all over. Worse on the left side of the knee. Has not seen anyone for this issue. Has been taking APAP sparingly. She is requesting a refill of her Levothyroxine. Labs last checking in January. Allergies   Allergen Reactions    Penicillins Unknown (comments)       Current Outpatient Medications   Medication Sig    levothyroxine (SYNTHROID) 112 mcg tablet Take 1 Tablet by mouth Daily (before breakfast).  amLODIPine (NORVASC) 5 mg tablet Take 1 Tablet by mouth daily.  meclizine (ANTIVERT) 25 mg tablet Take 1 Tablet by mouth three (3) times daily as needed for Dizziness.  amLODIPine (NORVASC) 2.5 mg tablet TAKE 1 TABLET DAILY ALONG  WITH 5MG FOR TOTAL OF 7.5MGDAILY    apixaban (Eliquis) 5 mg tablet TAKE 1 TABLET TWICE A DAY    losartan-hydroCHLOROthiazide (HYZAAR) 100-25 mg per tablet Take 1 Tablet by mouth nightly.     metoprolol succinate (TOPROL-XL) 100 mg tablet TAKE 1 TABLET DAILY FOR    HEART AND PRESSURE         (REPLACES METOPROLOL       TARTRATE)    metFORMIN (GLUCOPHAGE) 500 mg tablet TAKE 1 TABLET 3 TIMES DAILYWITH MEALS    rosuvastatin (CRESTOR) 10 mg tablet TAKE 1 TABLET NIGHTLY    glucose blood VI test strips (OneTouch Verio test strips) strip USE 1 STRIP TO CHECK GLUCOSE ONCE DAILY    lancets (One Touch Delica) 33 gauge misc USE 1 LANCET EACH TIME TO TEST BLOOD SUGAR DAILY    glucose blood VI test strips (OneTouch Ultra Test) strip by Does Not Apply route daily. Use to test glucose daily E 11.9    BLOOD SUGAR DIAGNOSTIC (ONE TOUCH TEST) by Does Not Apply route daily. Indications: One Touch Verioflex Meter    therapeutic multivitamin (THERAGRAN) tablet Take 1 Tab by mouth daily. No current facility-administered medications for this visit. Past Medical History:   Diagnosis Date    Atrial fibrillation [facility])     Cholecystitis July 6, 2016    Diabetes [facility])     Dizziness     GERD (gastroesophageal reflux disease) 3/26/2014    Hyperlipidemia     Hypertension     Hypothyroidism     OA (osteoarthritis) of knee     Pain, wrist     Carpal tunnel?     Severe obesity ([address] 4/5/2019    Unspecified osteoarthritis, unspecified site 1/9/2022    Unspecified sleep apnea     CPAP mask       Past Surgical History:   Procedure Laterality Date    HX AFIB ABLATION      HX APPENDECTOMY      HX BREAST LUMPECTOMY  1971    Right breast     HX CHOLECYSTECTOMY  2016    HX HYSTERECTOMY      HX TONSIL AND ADENOIDECTOMY      FL COLONOSCOPY FLX DX W/COLLJ SPEC WHEN PFRMD  11/14/2013            Social History     Socioeconomic History    Marital status:     Number of children: 2   Occupational History    Occupation: Facilitator     Employer: RETIRED   Tobacco Use    Smoking status: Never Smoker    Smokeless tobacco: Never Used   Vaping Use    Vaping Use: Never used   Substance and Sexual Activity    Alcohol use: No     Alcohol/week: 0.0 standard drinks    Drug use: No    Sexual activity: Not Currently   Other Topics Concern     Service No    Blood Transfusions No    Caffeine Concern No    Occupational Exposure No    Hobby Hazards No    Sleep Concern No    Stress Concern No    Weight Concern Yes    Special Diet Yes     Comment: Diabetic    Back Care No    Exercise No    Bike Helmet No    Seat Belt Yes    Self-Exams Yes       Family History   Problem Relation Age of Onset    Cancer Father         lung    Diabetes Mother        Above history reviewed. ROS:  Denies fever, chills, cough, chest pain, SOB,  nausea, vomiting, or diarrhea. Denies wt loss, wt gain, hemoptysis, hematochezia or melena. Physical Examination:    /60 (BP 1 Location: Left arm, BP Patient Position: Sitting, BP Cuff Size: Adult long)   Pulse 67   Temp 98.6 °F (37 °C) (Temporal)   Resp 18   Ht 5' 7\" (1.702 m)   Wt 187 lb 12.8 oz (85.2 kg)   SpO2 98%   BMI 29.41 kg/m²     General: Alert and Ox3, Fluent speech  Neck:  Supple, no adenopathy, JVD, mass or bruit  Chest:  Clear to Ausculation, without wheezes, rales, rubs or ronchi  Cardiac: loud murmur  Extremities:  No cyanosis, clubbing or edema  Knee exam:  Left knee: No erythema or bruising. Nontender to quadriceps tendon Nontender to patellar tendon or tibial tuberosity. No laxity to cruiciate or collateral ligaments. No menisceal sign medial or lateral aspect. Mild joint effusion noted laterally. Mildly TTP along medial and lateral aspect of the knee. Mild crepitus with full ROM. Neurologic:  Ambulatory without assist, CN 2-12 grossly intact. Moves all extremities. Skin: no rash  Lymphadenopathy: no cervical or supraclavicular nodes    ASSESSMENT AND PLAN:     1. Acquired hypothyroidism  Refilling   Following with PCP for labs   - levothyroxine (SYNTHROID) 112 mcg tablet; Take 1 Tablet by mouth Daily (before breakfast). Dispense: 90 Tablet; Refill: 4    2.  Chronic pain of left knee  Not improved with conservative measures  Continue ice, elevation and APAP  Ordering plain films  Sending to ortho for evaluation   - XR KNEE LT MAX 2 VWS; Future  - REFERRAL TO ORTHOPEDICS     [facility] PRBARRINGTON Whitney NP

## 2022-03-18 PROBLEM — E03.4 HYPOTHYROIDISM DUE TO ACQUIRED ATROPHY OF THYROID: Status: ACTIVE | Noted: 2017-08-09

## 2022-03-18 PROBLEM — I10 ESSENTIAL HYPERTENSION: Status: ACTIVE | Noted: 2017-08-15

## 2022-03-19 PROBLEM — M19.90 UNSPECIFIED OSTEOARTHRITIS, UNSPECIFIED SITE: Status: ACTIVE | Noted: 2022-01-09

## 2022-03-19 PROBLEM — F41.9 ANXIETY: Status: ACTIVE | Noted: 2017-09-25

## 2022-03-19 PROBLEM — E11.21 TYPE 2 DIABETES WITH NEPHROPATHY (HCC): Status: ACTIVE | Noted: 2018-02-26

## 2022-03-19 PROBLEM — E66.01 SEVERE OBESITY (HCC): Status: ACTIVE | Noted: 2019-04-05

## 2022-05-24 PROBLEM — N18.30 CHRONIC RENAL DISEASE, STAGE III (HCC): Status: ACTIVE | Noted: 2022-05-24

## 2022-05-24 NOTE — PROGRESS NOTES
Ms. Marianne Henao is a 68 y.o. female who is here for evaluation of   Chief Complaint   Patient presents with    Diabetes     Has log of checks - ranges from 130-188    Hypertension     Has daily checks of BPs. Ranges from 120-150 /80s    Fall     3 recent falls. States frequently trips over things.  Anxiety     Was previously on zoloft, requesting to restart medications   . ASSESSMENT AND PLAN:    1. Type 2 diabetes with nephropathy (HCC)  - HEMOGLOBIN A1C WITH EAG; Future  - HEMOGLOBIN A1C WITH EAG    2. Stage 3a chronic kidney disease (HCC)  - METABOLIC PANEL, COMPREHENSIVE; Future  - METABOLIC PANEL, COMPREHENSIVE    3. Acquired hypothyroidism  Discussion today regarding TSH and other thyroid hormone levels. These will be checked today and we will contact her by phone with further instructions on her dose.  - TSH 3RD GENERATION; Future  - T4 (THYROXINE); Future  - T4, FREE; Future  - T4, FREE  - T4 (THYROXINE)  - TSH 3RD GENERATION    4. Chronic atrial fibrillation (HCC)  Follow[facility] by [facility]. Carlos Manning at [facility]. Status post ablation. Rate controlled. 5. Mixed hyperlipidemia  She is due for labs  - CBC WITH AUTOMATED DIFF; Future  - LIPID PANEL; Future  - METABOLIC PANEL, COMPREHENSIVE; Future  - METABOLIC PANEL, COMPREHENSIVE  - LIPID PANEL  - CBC WITH AUTOMATED DIFF    6. Primary hypertension  In the event that her blood pressure is not optimal at her next visit, we will discontinue her losartan HCTZ in favor of olmesartan and chlorthalidone.  - CBC WITH AUTOMATED DIFF; Future  - METABOLIC PANEL, COMPREHENSIVE;  Future  - METABOLIC PANEL, COMPREHENSIVE  - CBC WITH AUTOMATED DIFF      Orders Placed This Encounter    TSH 3RD GENERATION     Standing Status:   Future     Number of Occurrences:   1     Standing Expiration Date:   6/3/2022    T4 (THYROXINE)     Standing Status:   Future     Number of Occurrences:   1     Standing Expiration Date:   6/3/2022    T4, FREE     Standing Status:   Future Number of Occurrences:   1     Standing Expiration Date:   6/3/2022    CBC WITH AUTOMATED DIFF     Standing Status:   Future     Number of Occurrences:   1     Standing Expiration Date:   6/3/2022    HEMOGLOBIN A1C WITH EAG     Standing Status:   Future     Number of Occurrences:   1     Standing Expiration Date:   6/3/2022    LIPID PANEL     Standing Status:   Future     Number of Occurrences:   1     Standing Expiration Date:   9/7/5934    METABOLIC PANEL, COMPREHENSIVE     Standing Status:   Future     Number of Occurrences:   1     Standing Expiration Date:   6/3/2022    sertraline (ZOLOFT) 50 mg tablet     Sig: Take 1 Tablet by mouth daily. Dispense:  90 Tablet     Refill:  3           HPI  80-year-old woman who has a history of chronic atrial fibrillation anticoagulated with Eliquis, hypertension treated with losartan-HCTZ and metoprolol with amlodipine, type 2 diabetes treated with metformin and finally hyperlipidemia for which she takes rosuvastatin 10 mg daily arrives for interval assessment. Blood pressure is elevated. She does take losartan HCTZ at bedtime. History of hypothyroidism. Needs labs today. She is followed by Dr. Robert Thomas. She has had an ablation for her atrial fibrillation. She has aortic stenosis and her last recorded [facility] echo was August 2021 which demonstrated an aortic valve area of 1.1 cm², a peak gradient of 31 mmHg and a mean gradient of 20 mmHg. She was noted to have significant diastolic dysfunction. Diabetes-most recent A1c was 4 months ago at 7.4.     ROS:  Denies  fever, chills, cough, chest pain, SOB,  nausea, vomiting, or diarrhea. Denies wt loss, wt gain, hemoptysis, hematochezia or melena.     Physical Examination:    Visit Vitals  BP (!) 154/60 (BP 1 Location: Left upper arm, BP Patient Position: Sitting, BP Cuff Size: Adult long)   Pulse 62   Temp 97.9 °F (36.6 °C) (Temporal)   Resp 17   Ht 5' 7\" (1.702 m)   Wt 183 lb 6.4 oz (83.2 kg)   SpO2 96% BMI 28.72 kg/m²      General:  Alert, cooperative, no distress. Head:  Normocephalic, without obvious abnormality, atraumatic. Eyes:  Conjunctivae/corneas clear. Pupils equal, round, reactive to light. Extraocular movements intact. Lungs:   Clear to auscultation bilaterally. Chest wall:  No tenderness or deformity. Cardiac:   Regular rate and rhythm with a 3/6 systolic murmur heard best over the aorta. Abdomen:   Soft, non-tender. Bowel sounds normal. No masses. No organomegaly. Extremities: Extremities normal, atraumatic, no cyanosis or edema. Pulses: 2+ and symmetric all extremities. Skin: Skin color, texture, turgor normal. No rashes or lesions. Lymph nodes: Cervical, supraclavicular, and axillary nodes normal.   Neurologic: CNII-XII intact. Normal strength, sensation, and reflexes throughout. On this date 05/27/2022 I have spent 30 minutes reviewing previous notes, test results and face to face with the patient discussing the diagnosis and importance of compliance with the treatment plan as well as documenting on the day of the visit.     López Lu MD FACP    (signed electronically) on 5/27/2022 at 12:05 PM

## 2022-05-27 ENCOUNTER — OFFICE VISIT (OUTPATIENT)
Dept: FAMILY MEDICINE CLINIC | Age: 77
End: 2022-05-27
Payer: MEDICARE

## 2022-05-27 VITALS
SYSTOLIC BLOOD PRESSURE: 154 MMHG | HEIGHT: 67 IN | OXYGEN SATURATION: 96 % | HEART RATE: 62 BPM | RESPIRATION RATE: 17 BRPM | BODY MASS INDEX: 28.79 KG/M2 | WEIGHT: 183.4 LBS | DIASTOLIC BLOOD PRESSURE: 60 MMHG | TEMPERATURE: 97.9 F

## 2022-05-27 DIAGNOSIS — I48.20 CHRONIC ATRIAL FIBRILLATION (HCC): ICD-10-CM

## 2022-05-27 DIAGNOSIS — N18.31 STAGE 3A CHRONIC KIDNEY DISEASE (HCC): ICD-10-CM

## 2022-05-27 DIAGNOSIS — E78.2 MIXED HYPERLIPIDEMIA: ICD-10-CM

## 2022-05-27 DIAGNOSIS — I10 PRIMARY HYPERTENSION: ICD-10-CM

## 2022-05-27 DIAGNOSIS — E11.21 TYPE 2 DIABETES WITH NEPHROPATHY (HCC): Primary | ICD-10-CM

## 2022-05-27 DIAGNOSIS — E03.9 ACQUIRED HYPOTHYROIDISM: ICD-10-CM

## 2022-05-27 PROCEDURE — 99214 OFFICE O/P EST MOD 30 MIN: CPT | Performed by: INTERNAL MEDICINE

## 2022-05-27 RX ORDER — SERTRALINE HYDROCHLORIDE 50 MG/1
50 TABLET, FILM COATED ORAL DAILY
Qty: 90 TABLET | Refills: 3 | Status: SHIPPED | OUTPATIENT
Start: 2022-05-27

## 2022-05-27 NOTE — PROGRESS NOTES
Margarita Whitaker is a 68 y.o. female presenting for/with:    Chief Complaint   Patient presents with    Diabetes     Has log of checks - ranges from 130-188    Hypertension     Has daily checks of BPs. Ranges from 120-150 /80s    Fall     3 recent falls. States frequently trips over things.  Anxiety     Was previously on zoloft, requesting to restart medications       Visit Vitals  BP (!) 154/60 (BP 1 Location: Left upper arm, BP Patient Position: Sitting, BP Cuff Size: Adult long)   Pulse 62   Temp 97.9 °F (36.6 °C) (Temporal)   Resp 17   Ht 5' 7\" (1.702 m)   Wt 183 lb 6.4 oz (83.2 kg)   SpO2 96%   BMI 28.72 kg/m²     Pain Scale: 0 - No pain/10  Pain Location:     1. \"Have you been to the ER, urgent care clinic since your last visit? Hospitalized since your last visit? \" No    2. \"Have you seen or consulted any other health care providers outside of the [address] [place] [address] since your last visit? \" No     3. For patients aged 39-70: Has the patient had a colonoscopy / FIT/ Cologuard? NA - based on age      If the patient is female:    4. For patients aged 41-77: Has the patient had a mammogram within the past 2 years? NA - based on age or sex      11. For patients aged 21-65: Has the patient had a pap smear?  NA - based on age or sex      Symptom review:  NO  Fever   NO  Shaking chills  NO  Cough  NO  Body aches  NO  Coughing up blood  NO  Chest congestion  NO  Chest pain  NO  Shortness of breath  NO  Profound Loss of smell/taste  NO  Nausea/Vomiting   NO  Loose stool/Diarrhea  NO  any skin issues    Patient Risk Factors Reviewed as follows:  NO  have you been in Close contact with confirmed COVID19 patient   NO  History of recent travel to affected geographical areas within the past 14 days  NO  COPD  NO  Active Cancer/Leukemia/Lymphoma/Chemotherapy  NO  Oral steroid use  NO  Pregnant  YES  Diabetes Mellitus  YES  Heart disease  NO  Asthma  NO Health care worker at home  NO Health care worker  NO Is there a Pregnant Woman in the home  NO Dialysis pt in the home   NO a large number of people living in the home    Learning Assessment 3/15/2022   PRIMARY LEARNER Patient   BARRIERS PRIMARY LEARNER -   CO-LEARNER CAREGIVER -   PRIMARY LANGUAGE ENGLISH   LEARNER PREFERENCE PRIMARY READING     -   ANSWERED BY patient   RELATIONSHIP SELF     Fall Risk Assessment, last 12 mths 5/27/2022   Able to walk? Yes   Fall in past 12 months? 1   Do you feel unsteady? 0   Are you worried about falling 1   Is TUG test greater than 12 seconds? -   Is the gait abnormal? 0   Number of falls in past 12 months 2   Fall with injury? 0       3 most recent PHQ Screens 5/27/2022   Little interest or pleasure in doing things Not at all   Feeling down, depressed, irritable, or hopeless Not at all   Total Score PHQ 2 0     Abuse Screening Questionnaire 5/27/2022   Do you ever feel afraid of your partner? N   Are you in a relationship with someone who physically or mentally threatens you? N   Is it safe for you to go home?  Y       ADL Assessment 5/27/2022   Feeding yourself No Help Needed   Getting from bed to chair No Help Needed   Getting dressed No Help Needed   Bathing or showering No Help Needed   Walk across the room (includes cane/walker) No Help Needed   Using the telphone No Help Needed   Taking your medications No Help Needed   Preparing meals No Help Needed   Managing money (expenses/bills) No Help Needed   Moderately strenuous housework (laundry) No Help Needed   Shopping for personal items (toiletries/medicines) No Help Needed   Shopping for groceries No Help Needed   Driving No Help Needed   Climbing a flight of stairs No Help Needed   Getting to places beyond walking distances No Help Needed      Advance Care Planning 3/15/2022   Patient's Healthcare Decision Maker is: Legal Next of Kin   Confirm Advance Directive None   Patient Would Like to Complete Advance Directive No

## 2022-05-28 LAB
ALBUMIN SERPL-MCNC: 4.4 G/DL (ref 3.5–5)
ALBUMIN/GLOB SERPL: 1.2 {RATIO} (ref 1.1–2.2)
ALP SERPL-CCNC: 100 U/L (ref 45–117)
ALT SERPL-CCNC: 18 U/L (ref 12–78)
ANION GAP SERPL CALC-SCNC: 7 MMOL/L (ref 5–15)
AST SERPL-CCNC: 15 U/L (ref 15–37)
BASOPHILS # BLD: 0.1 K/UL (ref 0–0.1)
BASOPHILS NFR BLD: 1 % (ref 0–1)
BILIRUB SERPL-MCNC: 0.4 MG/DL (ref 0.2–1)
BUN SERPL-MCNC: 18 MG/DL (ref 6–20)
BUN/CREAT SERPL: 16 (ref 12–20)
CALCIUM SERPL-MCNC: 9.7 MG/DL (ref 8.5–10.1)
CHLORIDE SERPL-SCNC: 101 MMOL/L (ref 97–108)
CHOLEST SERPL-MCNC: 202 MG/DL
CO2 SERPL-SCNC: 29 MMOL/L (ref 21–32)
CREAT SERPL-MCNC: 1.11 MG/DL (ref 0.55–1.02)
DIFFERENTIAL METHOD BLD: NORMAL
EOSINOPHIL # BLD: 0.3 K/UL (ref 0–0.4)
EOSINOPHIL NFR BLD: 3 % (ref 0–7)
ERYTHROCYTE [DISTWIDTH] IN BLOOD BY AUTOMATED COUNT: 13.3 % (ref 11.5–14.5)
EST. AVERAGE GLUCOSE BLD GHB EST-MCNC: 160 MG/DL
GLOBULIN SER CALC-MCNC: 3.7 G/DL (ref 2–4)
GLUCOSE SERPL-MCNC: 130 MG/DL (ref 65–100)
HBA1C MFR BLD: 7.2 % (ref 4–5.6)
HCT VFR BLD AUTO: 39.6 % (ref 35–47)
HDLC SERPL-MCNC: 68 MG/DL
HDLC SERPL: 3 {RATIO} (ref 0–5)
HGB BLD-MCNC: 12.8 G/DL (ref 11.5–16)
IMM GRANULOCYTES # BLD AUTO: 0 K/UL (ref 0–0.04)
IMM GRANULOCYTES NFR BLD AUTO: 0 % (ref 0–0.5)
LDLC SERPL CALC-MCNC: 104.4 MG/DL (ref 0–100)
LYMPHOCYTES # BLD: 2.7 K/UL (ref 0.8–3.5)
LYMPHOCYTES NFR BLD: 34 % (ref 12–49)
MCH RBC QN AUTO: 30.2 PG (ref 26–34)
MCHC RBC AUTO-ENTMCNC: 32.3 G/DL (ref 30–36.5)
MCV RBC AUTO: 93.4 FL (ref 80–99)
MONOCYTES # BLD: 0.7 K/UL (ref 0–1)
MONOCYTES NFR BLD: 9 % (ref 5–13)
NEUTS SEG # BLD: 4.1 K/UL (ref 1.8–8)
NEUTS SEG NFR BLD: 53 % (ref 32–75)
NRBC # BLD: 0 K/UL (ref 0–0.01)
NRBC BLD-RTO: 0 PER 100 WBC
PLATELET # BLD AUTO: 293 K/UL (ref 150–400)
PMV BLD AUTO: 10.6 FL (ref 8.9–12.9)
POTASSIUM SERPL-SCNC: 4 MMOL/L (ref 3.5–5.1)
PROT SERPL-MCNC: 8.1 G/DL (ref 6.4–8.2)
RBC # BLD AUTO: 4.24 M/UL (ref 3.8–5.2)
SODIUM SERPL-SCNC: 137 MMOL/L (ref 136–145)
T4 FREE SERPL-MCNC: 1.2 NG/DL (ref 0.8–1.5)
T4 SERPL-MCNC: 12.2 UG/DL (ref 4.8–13.9)
TRIGL SERPL-MCNC: 148 MG/DL (ref ?–150)
TSH SERPL DL<=0.05 MIU/L-ACNC: 0.3 UIU/ML (ref 0.36–3.74)
VLDLC SERPL CALC-MCNC: 29.6 MG/DL
WBC # BLD AUTO: 7.9 K/UL (ref 3.6–11)

## 2022-05-30 ENCOUNTER — APPOINTMENT (OUTPATIENT)
Dept: CT IMAGING | Age: 77
End: 2022-05-30
Attending: EMERGENCY MEDICINE
Payer: MEDICARE

## 2022-05-30 ENCOUNTER — HOSPITAL ENCOUNTER (EMERGENCY)
Age: 77
Discharge: HOME | End: 2022-05-30
Attending: EMERGENCY MEDICINE
Payer: MEDICARE

## 2022-05-30 VITALS
DIASTOLIC BLOOD PRESSURE: 71 MMHG | SYSTOLIC BLOOD PRESSURE: 171 MMHG | OXYGEN SATURATION: 97 % | HEART RATE: 57 BPM | RESPIRATION RATE: 17 BRPM | WEIGHT: 177 LBS | BODY MASS INDEX: 27.78 KG/M2 | TEMPERATURE: 98.2 F | HEIGHT: 67 IN

## 2022-05-30 DIAGNOSIS — R10.9 FLANK PAIN: Primary | ICD-10-CM

## 2022-05-30 LAB
ALBUMIN SERPL-MCNC: 4.3 G/DL (ref 3.5–5)
ALBUMIN/GLOB SERPL: 1.1 {RATIO} (ref 1.1–2.2)
ALP SERPL-CCNC: 96 U/L (ref 45–117)
ALT SERPL-CCNC: 21 U/L (ref 12–78)
ANION GAP SERPL CALC-SCNC: 12 MMOL/L (ref 5–15)
APPEARANCE UR: CLEAR
AST SERPL-CCNC: 20 U/L (ref 15–37)
BACTERIA URNS QL MICRO: NEGATIVE /HPF
BASOPHILS # BLD: 0.1 K/UL (ref 0–0.1)
BASOPHILS NFR BLD: 1 % (ref 0–1)
BILIRUB SERPL-MCNC: 0.4 MG/DL (ref 0.2–1)
BILIRUB UR QL: NEGATIVE
BUN SERPL-MCNC: 16 MG/DL (ref 6–20)
BUN/CREAT SERPL: 14 (ref 12–20)
CALCIUM SERPL-MCNC: 9.7 MG/DL (ref 8.5–10.1)
CHLORIDE SERPL-SCNC: 101 MMOL/L (ref 97–108)
CO2 SERPL-SCNC: 27 MMOL/L (ref 21–32)
COLOR UR: ABNORMAL
CREAT SERPL-MCNC: 1.14 MG/DL (ref 0.55–1.02)
DIFFERENTIAL METHOD BLD: NORMAL
EOSINOPHIL # BLD: 0.2 K/UL (ref 0–0.4)
EOSINOPHIL NFR BLD: 3 % (ref 0–7)
EPITH CASTS URNS QL MICRO: NORMAL /LPF
ERYTHROCYTE [DISTWIDTH] IN BLOOD BY AUTOMATED COUNT: 13.2 % (ref 11.5–14.5)
GLOBULIN SER CALC-MCNC: 3.9 G/DL (ref 2–4)
GLUCOSE SERPL-MCNC: 170 MG/DL (ref 65–100)
GLUCOSE UR STRIP.AUTO-MCNC: NEGATIVE MG/DL
HCT VFR BLD AUTO: 37.1 % (ref 35–47)
HGB BLD-MCNC: 12.5 G/DL (ref 11.5–16)
HGB UR QL STRIP: NEGATIVE
IMM GRANULOCYTES # BLD AUTO: 0 K/UL (ref 0–0.04)
IMM GRANULOCYTES NFR BLD AUTO: 0 % (ref 0–0.5)
KETONES UR QL STRIP.AUTO: NEGATIVE MG/DL
LEUKOCYTE ESTERASE UR QL STRIP.AUTO: ABNORMAL
LYMPHOCYTES # BLD: 2.3 K/UL (ref 0.8–3.5)
LYMPHOCYTES NFR BLD: 32 % (ref 12–49)
MCH RBC QN AUTO: 29.8 PG (ref 26–34)
MCHC RBC AUTO-ENTMCNC: 33.7 G/DL (ref 30–36.5)
MCV RBC AUTO: 88.3 FL (ref 80–99)
MONOCYTES # BLD: 0.6 K/UL (ref 0–1)
MONOCYTES NFR BLD: 8 % (ref 5–13)
NEUTS SEG # BLD: 3.9 K/UL (ref 1.8–8)
NEUTS SEG NFR BLD: 56 % (ref 32–75)
NITRITE UR QL STRIP.AUTO: NEGATIVE
NRBC # BLD: 0 K/UL (ref 0–0.01)
NRBC BLD-RTO: 0 PER 100 WBC
PH UR STRIP: 6.5 [PH] (ref 5–8)
PLATELET # BLD AUTO: 276 K/UL (ref 150–400)
PMV BLD AUTO: 9.8 FL (ref 8.9–12.9)
POTASSIUM SERPL-SCNC: 3.8 MMOL/L (ref 3.5–5.1)
PROT SERPL-MCNC: 8.2 G/DL (ref 6.4–8.2)
PROT UR STRIP-MCNC: NEGATIVE MG/DL
RBC # BLD AUTO: 4.2 M/UL (ref 3.8–5.2)
RBC #/AREA URNS HPF: NORMAL /HPF (ref 0–5)
SODIUM SERPL-SCNC: 140 MMOL/L (ref 136–145)
SP GR UR REFRACTOMETRY: 1.01 (ref 1–1.03)
UROBILINOGEN UR QL STRIP.AUTO: 0.2 EU/DL (ref 0.2–1)
WBC # BLD AUTO: 7.1 K/UL (ref 3.6–11)
WBC URNS QL MICRO: NORMAL /HPF (ref 0–4)

## 2022-05-30 PROCEDURE — 81001 URINALYSIS AUTO W/SCOPE: CPT

## 2022-05-30 PROCEDURE — 99284 EMERGENCY DEPT VISIT MOD MDM: CPT

## 2022-05-30 PROCEDURE — 36415 COLL VENOUS BLD VENIPUNCTURE: CPT

## 2022-05-30 PROCEDURE — 85025 COMPLETE CBC W/AUTO DIFF WBC: CPT

## 2022-05-30 PROCEDURE — 74176 CT ABD & PELVIS W/O CONTRAST: CPT

## 2022-05-30 PROCEDURE — 80053 COMPREHEN METABOLIC PANEL: CPT

## 2022-05-30 PROCEDURE — 74011250637 HC RX REV CODE- 250/637: Performed by: EMERGENCY MEDICINE

## 2022-05-30 RX ORDER — SODIUM CHLORIDE 0.9 % (FLUSH) 0.9 %
5-10 SYRINGE (ML) INJECTION ONCE
Status: DISCONTINUED | OUTPATIENT
Start: 2022-05-30 | End: 2022-05-30 | Stop reason: HOSPADM

## 2022-05-30 RX ORDER — ACETAMINOPHEN 500 MG
1000 TABLET ORAL
Status: COMPLETED | OUTPATIENT
Start: 2022-05-30 | End: 2022-05-30

## 2022-05-30 RX ADMIN — ACETAMINOPHEN 1000 MG: 500 TABLET ORAL at 10:22

## 2022-05-30 NOTE — ED PROVIDER NOTES
EMERGENCY DEPARTMENT HISTORY AND PHYSICAL EXAM          Date: 5/30/2022  Patient Name: Eleuterio Lanza    History of Presenting Illness     Chief Complaint   Patient presents with    Flank Pain     pt comes with rightsided flank pain x 2 days with soreness x 5 days after a fall 1 week agot, no LOC, no OSB, pt states she triped       History Provided By: Patient    HPI: Eleuterio Lanza is a 68 y.o. female, pmhx hypertension, diabetes, A. fib on anticoagulants, GERD, who presents ambulatory to the ED c/o right flank pain    She explains that last week she was walking into the house when she tripped over the door jam falling onto the right side hitting the right side of her body. She states she was extremely sore for 2 days and her symptoms started to get better. However, over the weekend she started with some right mid flank pain and soreness. She states that first the muscle was extremely tight and so she was using Salonpas and Tylenol. Last patch was on yesterday evening and today she used some heat. She has not had any Tylenol today and currently rates her pain at 6/10. She denies any associated fevers, chills, nausea, vomiting, diarrhea, hematuria or pain with urination. She notes she did take her blood pressure medication about 1 hour ago. Patient been vaccinated against shingles. PCP: Abundio Howard MD    Allergies: Penicillin  There are no other complaints, changes, or physical findings at this time. Current Facility-Administered Medications   Medication Dose Route Frequency Provider Last Rate Last Admin    sodium chloride (NS) flush 5-10 mL  5-10 mL IntraVENous ONCE FabioeerMichael MD         Current Outpatient Medications   Medication Sig Dispense Refill    sertraline (ZOLOFT) 50 mg tablet Take 1 Tablet by mouth daily. 90 Tablet 3    levothyroxine (SYNTHROID) 112 mcg tablet Take 1 Tablet by mouth Daily (before breakfast).  90 Tablet 4    amLODIPine (NORVASC) 5 mg tablet Take 1 Tablet by mouth daily. 90 Tablet 4    meclizine (ANTIVERT) 25 mg tablet Take 1 Tablet by mouth three (3) times daily as needed for Dizziness. 60 Tablet 2    amLODIPine (NORVASC) 2.5 mg tablet TAKE 1 TABLET DAILY ALONG  WITH 5MG FOR TOTAL OF 7.5MGDAILY 90 Tablet 3    apixaban (Eliquis) 5 mg tablet TAKE 1 TABLET TWICE A  Tablet 3    losartan-hydroCHLOROthiazide (HYZAAR) 100-25 mg per tablet Take 1 Tablet by mouth nightly. 90 Tablet 3    metoprolol succinate (TOPROL-XL) 100 mg tablet TAKE 1 TABLET DAILY FOR    HEART AND PRESSURE         (REPLACES METOPROLOL       TARTRATE) 90 Tablet 3    metFORMIN (GLUCOPHAGE) 500 mg tablet TAKE 1 TABLET 3 TIMES DAILYWITH MEALS 270 Tablet 3    rosuvastatin (CRESTOR) 10 mg tablet TAKE 1 TABLET NIGHTLY 90 Tablet 3    glucose blood VI test strips (OneTouch Verio test strips) strip USE 1 STRIP TO CHECK GLUCOSE ONCE DAILY 100 Strip 4    lancets (One Touch Delica) 33 gauge misc USE 1 LANCET EACH TIME TO TEST BLOOD SUGAR DAILY 100 Lancet 5    glucose blood VI test strips (OneTouch Ultra Test) strip by Does Not Apply route daily. Use to test glucose daily E 11.9 100 Strip 4    BLOOD SUGAR DIAGNOSTIC (ONE TOUCH TEST) by Does Not Apply route daily. Indications: One Touch Verioflex Meter      therapeutic multivitamin (THERAGRAN) tablet Take 1 Tab by mouth daily. Past History     Past Medical History:  Past Medical History:   Diagnosis Date    Atrial fibrillation ([facility] [address]     Cholecystitis July 6, 2016    Diabetes [facility])     Dizziness     GERD (gastroesophageal reflux disease) 3/26/2014    Hyperlipidemia     Hypertension     Hypothyroidism     OA (osteoarthritis) of knee     Pain, wrist     Carpal tunnel?     Severe obesity ([address] 4/5/2019    Unspecified osteoarthritis, unspecified site 1/9/2022    Unspecified sleep apnea     CPAP mask       Past Surgical History:  Past Surgical History:   Procedure Laterality Date    HX AFIB ABLATION      HX APPENDECTOMY      HX BREAST LUMPECTOMY  1971    Right breast     HX CHOLECYSTECTOMY  2016    HX HYSTERECTOMY      HX TONSIL AND ADENOIDECTOMY      ND COLONOSCOPY FLX DX W/COLLJ SPEC WHEN PFRMD  11/14/2013            Family History:  Family History   Problem Relation Age of Onset    Cancer Father         lung    Diabetes Mother        Social History:  Social History     Tobacco Use    Smoking status: Never Smoker    Smokeless tobacco: Never Used   Vaping Use    Vaping Use: Never used   Substance Use Topics    Alcohol use: No     Alcohol/week: 0.0 standard drinks    Drug use: No       Allergies: Allergies   Allergen Reactions    Penicillins Unknown (comments)         Review of Systems   Review of Systems   Constitutional: Negative for activity change, appetite change, chills, fever and unexpected weight change. HENT: Negative for congestion. Eyes: Negative for pain and visual disturbance. Respiratory: Negative for cough and shortness of breath. Cardiovascular: Negative for chest pain. Gastrointestinal: Negative for abdominal pain, diarrhea, nausea and vomiting. Genitourinary: Positive for flank pain. Negative for difficulty urinating, dysuria and frequency. Musculoskeletal: Negative for back pain. Skin: Negative for rash. Neurological: Negative for headaches. Physical Exam   Physical Exam  Vitals and nursing note reviewed. Constitutional:       Appearance: She is well-developed. She is not diaphoretic. Comments: Elderly female appears comfortable sitting on the gurney but with significantly elevated blood pressure   HENT:      Head: Normocephalic and atraumatic. Eyes:      General:         Right eye: No discharge. Left eye: No discharge. Conjunctiva/sclera: Conjunctivae normal.      Pupils: Pupils are equal, round, and reactive to light. Cardiovascular:      Rate and Rhythm: Normal rate and regular rhythm. Heart sounds: Normal heart sounds. No murmur heard.       Pulmonary: Effort: Pulmonary effort is normal. No respiratory distress. Breath sounds: Normal breath sounds. No wheezing or rales. Abdominal:      General: Bowel sounds are normal. There is no distension. Palpations: Abdomen is soft. Tenderness: There is no abdominal tenderness. Musculoskeletal:         General: Tenderness (Right flank at site of red patch) present. Normal range of motion. Cervical back: Normal range of motion and neck supple. Right lower leg: No edema. Left lower leg: No edema. Skin:     General: Skin is warm and dry. Findings: No rash. Comments: Red patch noted at the right flank without overlying blistering   Neurological:      Mental Status: She is alert and oriented to person, place, and time. Cranial Nerves: No cranial nerve deficit. Motor: No abnormal muscle tone.        Diagnostic Study Results     Labs -     Recent Results (from the past 12 hour(s))   URINALYSIS W/ RFLX MICROSCOPIC    Collection Time: 05/30/22  9:58 AM   Result Value Ref Range    Color YELLOW/STRAW      Appearance CLEAR CLEAR      Specific gravity 1.010 1.003 - 1.030      pH (UA) 6.5 5.0 - 8.0      Protein Negative NEG mg/dL    Glucose Negative NEG mg/dL    Ketone Negative NEG mg/dL    Bilirubin Negative NEG      Blood Negative NEG      Urobilinogen 0.2 0.2 - 1.0 EU/dL    Nitrites Negative NEG      Leukocyte Esterase SMALL (A) NEG     URINE MICROSCOPIC ONLY    Collection Time: 05/30/22  9:58 AM   Result Value Ref Range    WBC 0-4 0 - 4 /hpf    RBC 0-5 0 - 5 /hpf    Epithelial cells FEW FEW /lpf    Bacteria Negative NEG /hpf   CBC WITH AUTOMATED DIFF    Collection Time: 05/30/22 10:19 AM   Result Value Ref Range    WBC 7.1 3.6 - 11.0 K/uL    RBC 4.20 3.80 - 5.20 M/uL    HGB 12.5 11.5 - 16.0 g/dL    HCT 37.1 35.0 - 47.0 %    MCV 88.3 80.0 - 99.0 FL    MCH 29.8 26.0 - 34.0 PG    MCHC 33.7 30.0 - 36.5 g/dL    RDW 13.2 11.5 - 14.5 %    PLATELET 517 086 - 171 K/uL    MPV 9.8 8.9 - 12.9 FL    NRBC 0.0 0  WBC    ABSOLUTE NRBC 0.00 0.00 - 0.01 K/uL    NEUTROPHILS 56 32 - 75 %    LYMPHOCYTES 32 12 - 49 %    MONOCYTES 8 5 - 13 %    EOSINOPHILS 3 0 - 7 %    BASOPHILS 1 0 - 1 %    IMMATURE GRANULOCYTES 0 0.0 - 0.5 %    ABS. NEUTROPHILS 3.9 1.8 - 8.0 K/UL    ABS. LYMPHOCYTES 2.3 0.8 - 3.5 K/UL    ABS. MONOCYTES 0.6 0.0 - 1.0 K/UL    ABS. EOSINOPHILS 0.2 0.0 - 0.4 K/UL    ABS. BASOPHILS 0.1 0.0 - 0.1 K/UL    ABS. IMM. GRANS. 0.0 0.00 - 0.04 K/UL    DF AUTOMATED     METABOLIC PANEL, COMPREHENSIVE    Collection Time: 05/30/22 10:19 AM   Result Value Ref Range    Sodium 140 136 - 145 mmol/L    Potassium 3.8 3.5 - 5.1 mmol/L    Chloride 101 97 - 108 mmol/L    CO2 27 21 - 32 mmol/L    Anion gap 12 5 - 15 mmol/L    Glucose 170 (H) 65 - 100 mg/dL    BUN 16 6 - 20 MG/DL    Creatinine 1.14 (H) 0.55 - 1.02 MG/DL    BUN/Creatinine ratio 14 12 - 20      GFR est AA 56 (L) >60 ml/min/1.73m2    GFR est non-AA 46 (L) >60 ml/min/1.73m2    Calcium 9.7 8.5 - 10.1 MG/DL    Bilirubin, total 0.4 0.2 - 1.0 MG/DL    ALT (SGPT) 21 12 - 78 U/L    AST (SGOT) 20 15 - 37 U/L    Alk. phosphatase 96 45 - 117 U/L    Protein, total 8.2 6.4 - 8.2 g/dL    Albumin 4.3 3.5 - 5.0 g/dL    Globulin 3.9 2.0 - 4.0 g/dL    A-G Ratio 1.1 1.1 - 2.2         Radiologic Studies -   CT ABD PELV WO CONT   Final Result   No acute intra-abdominal pathology. No evidence of nephrolithiasis. CT Results  (Last 48 hours)               05/30/22 1036  CT ABD PELV WO CONT Final result    Impression:  No acute intra-abdominal pathology. No evidence of nephrolithiasis. Narrative:  EXAM: CT ABD PELV WO CONT       INDICATION: right flank pain       COMPARISON: July 2016       IV CONTRAST: None. ORAL CONTRAST: None       TECHNIQUE:    Thin axial images were obtained through the abdomen and pelvis. Coronal and   sagittal reformats were generated.  CT dose reduction was achieved through use of   a standardized protocol tailored for this examination and automatic exposure   control for dose modulation. The absence of intravenous contrast material reduces the sensitivity for   evaluation of the vasculature and solid organs. FINDINGS:    LOWER THORAX: No significant abnormality in the incidentally imaged lower chest.   LIVER: No mass. BILIARY TREE: Status post cholecystectomy. CBD is not dilated. SPLEEN: within normal limits. PANCREAS: No focal abnormality. ADRENALS: Unremarkable. KIDNEYS/URETERS: No calculus or hydronephrosis. STOMACH: Unremarkable. SMALL BOWEL: No dilatation or wall thickening. COLON: No dilatation or wall thickening. APPENDIX: Nonvisualized   PERITONEUM: No ascites or pneumoperitoneum. RETROPERITONEUM: No lymphadenopathy or aortic aneurysm. REPRODUCTIVE ORGANS: Status post hysterectomy   URINARY BLADDER: No mass or calculus. BONES: No destructive bone lesion. ABDOMINAL WALL: No mass or hernia. ADDITIONAL COMMENTS: N/A               CXR Results  (Last 48 hours)    None            Medical Decision Making   I am the first provider for this patient. I reviewed the vital signs, available nursing notes, past medical history, past surgical history, family history and social history. Vital Signs-Reviewed the patient's vital signs. Patient Vitals for the past 12 hrs:   Temp Pulse Resp BP SpO2   05/30/22 1109 -- (!) 57 17 (!) 171/71 97 %   05/30/22 1007 98.2 °F (36.8 °C) 67 16 (!) 196/71 100 %       Pulse Oximetry Analysis - 100% on RA    Records Reviewed: Nursing Notes and Old Medical Records    Provider Notes (Medical Decision Making):   MDM: Elderly female presenting with right flank pain without urinary symptoms. Patient is afebrile but significantly hypertensive. Did have a fall a week ago and is on anticoagulants who could have some true peritoneal pathology or renal pathology but given the red patch at the site of her symptoms concerned she could be developing shingles.   Offered symptomatic medication for pain control but patient states she like to start with Tylenol at this time. CT scan and labs with urinalysis to be obtained. ED Course:   Initial assessment performed. The patients presenting problems have been discussed, and they are in agreement with the care plan formulated and outlined with them. I have encouraged them to ask questions as they arise throughout their visit. PROGRESS NOTE:  11:15 AM  Pt appears comfortable. Blood pressure remains elevated although improved. Discussed management at home with Tylenol and avoiding NSAIDs given her anticoagulant therapy. Recommend back stretches to help with her soreness and follow-up in primary care. Discharge note:  Pt re-evaluated and noted to be feeling better, ready for discharge. Updated pt on all final lab and imaging findings. Will follow up as instructed. All questions have been answered, pt voiced understanding and agreement with plan. Specific return precautions provided as well as instructions to return to the ED should sx worsen at any time. Vital signs stable for discharge. Critical Care Time:   0      Diagnosis     Clinical Impression:   1. Flank pain        PLAN:  1. Discharge Medication List as of 5/30/2022 12:11 PM        2. Follow-up Information     Follow up With Specialties Details Why Contact Info    Kiara Finch MD Internal Medicine Physician   [phone]          Return to ED if worse     Disposition:  Home       Please note, this dictation was completed with MyMosa, the computer voice recognition software. Quite often unanticipated grammatical, syntax, homophones, and other interpretive errors are inadvertently transcribed by the computer software. Please disregard these errors. Please excuse any errors that have escaped final proof reading.

## 2022-05-30 NOTE — ED TRIAGE NOTES
Pt arrived to ED via Private vehicle for complaint of intermittent sharp right flank pain x 2 days, also states she fell x 1 week ago on to that side and has had a dull aching x 5 days, today 6/10 state use of lidocaine patch, heating pad, and tylenol for the past 2 days. Pt ambulatory on arrival, and states no fevers, SOB, or LOC. Vitals are stable.

## 2022-07-11 ENCOUNTER — TELEPHONE (OUTPATIENT)
Dept: FAMILY MEDICINE CLINIC | Age: 77
End: 2022-07-11

## 2022-07-11 NOTE — TELEPHONE ENCOUNTER
Please call Vaughn Mcdowell [phone]  Reference # [id number]    Re: question about pt medication Amlodipine

## 2022-09-25 NOTE — PROGRESS NOTES
Ms. Nona Burr is a 68 y.o. female who is here for evaluation of   Chief Complaint   Patient presents with    Diabetes     Does not routinely check glucoses    Hypertension    Immunization/Injection     Flu vaccine provided. Will get COVID in 2 weeks   . ASSESSMENT AND PLAN:    1. Type 2 diabetes with nephropathy ([facility] [address]  Due for A1c  - HEMOGLOBIN A1C WITH EAG; Future  - HEMOGLOBIN A1C WITH EAG    2. Stage 3a chronic kidney disease ([facility] [address]  3. Acquired hypothyroidism  - TSH 3RD GENERATION; Future  - T4 (THYROXINE); Future  - THYROID ANTIBODY PANEL; Future  - THYROID ANTIBODY PANEL  - T4 (THYROXINE)  - TSH 3RD GENERATION    4. Chronic atrial fibrillation (HCC)    5. Primary hypertension  - LIPID PANEL; Future  - METABOLIC PANEL, COMPREHENSIVE; Future  - CBC WITH AUTOMATED DIFF; Future  - CBC WITH AUTOMATED DIFF  - METABOLIC PANEL, COMPREHENSIVE  - LIPID PANEL    6.  Needs flu shot  - INFLUENZA, FLUAD, (AGE 72 Y+), IM, PF, 0.5 ML      Orders Placed This Encounter    Influenza Vaccine, QUAD, 65 Yrs +  IM  (Fluad 44257 )    HEMOGLOBIN A1C WITH EAG     Standing Status:   Future     Number of Occurrences:   1     Standing Expiration Date:   11/3/2022    TSH 3RD GENERATION     Standing Status:   Future     Number of Occurrences:   1     Standing Expiration Date:   11/3/2022    T4 (THYROXINE)     Standing Status:   Future     Number of Occurrences:   1     Standing Expiration Date:   11/3/2022    LIPID PANEL     Standing Status:   Future     Number of Occurrences:   1     Standing Expiration Date:   39/0/8453    METABOLIC PANEL, COMPREHENSIVE     Standing Status:   Future     Number of Occurrences:   1     Standing Expiration Date:   11/3/2022    CBC WITH AUTOMATED DIFF     Standing Status:   Future     Number of Occurrences:   1     Standing Expiration Date:   11/3/2022    THYROID ANTIBODY PANEL     Standing Status:   Future     Number of Occurrences:   1     Standing Expiration Date:   11/3/2022           HPI   49-year-old woman who has a history of chronic atrial fibrillation anticoagulated with Eliquis, hypertension treated with losartan-HCTZ and metoprolol with amlodipine, type 2 diabetes treated with metformin and finally hyperlipidemia for which she takes rosuvastatin 10 mg daily arrives for interval assessment. Blood pressure is elevated. She does take losartan HCTZ at bedtime. History of hypothyroidism. Lab Results   Component Value Date/Time    TSH 0.30 (L) 05/27/2022 12:00 PM        She is followed by Dr. Rossana Sawant. She has had an ablation for her atrial fibrillation. She has aortic stenosis and her last recorded [facility] echo was August 2021 which demonstrated an aortic valve area of 1.1 cm², a peak gradient of 31 mmHg and a mean gradient of 20 mmHg. She was noted to have significant diastolic dysfunction. Diabetes-most recent A1c was 4 months ago at 7.2.      ROS:  Denies  fever, chills, cough, chest pain, SOB,  nausea, vomiting, or diarrhea. Denies wt loss, wt gain, hemoptysis, hematochezia or melena. Physical Examination:    Visit Vitals  /62 (BP 1 Location: Left upper arm, BP Patient Position: Sitting, BP Cuff Size: Adult long)   Pulse (!) 57   Temp 98.2 °F (36.8 °C) (Temporal)   Resp 16   Ht 5' 7\" (1.702 m)   Wt 176 lb 9.6 oz (80.1 kg)   SpO2 97%   BMI 27.66 kg/m²      General:  Alert, cooperative, no distress. Head:  Normocephalic, without obvious abnormality, atraumatic. Eyes:  Conjunctivae/corneas clear. Pupils equal, round, reactive to light. Extraocular movements intact. Lungs:   Clear to auscultation bilaterally. Chest wall:  No tenderness or deformity. Cardiac:  RRR 3/6 TOM   Abdomen:   Soft, non-tender. Bowel sounds normal. No masses. No organomegaly. Extremities: Extremities normal, atraumatic, no cyanosis or edema. Pulses: 2+ and symmetric all extremities. Skin: Skin color, texture, turgor normal. No rashes or lesions.    Lymph nodes: Cervical, supraclavicular, and axillary nodes normal.   Neurologic: CNII-XII intact. Normal strength, sensation, and reflexes throughout. On this date 09/29/2022 I have spent 30 minutes reviewing previous notes, test results and face to face with the patient discussing the diagnosis and importance of compliance with the treatment plan as well as documenting on the day of the visit.     Barak Leary MD FACP    (signed electronically) on 9/29/2022 at 11:35 AM

## 2022-09-29 ENCOUNTER — OFFICE VISIT (OUTPATIENT)
Dept: FAMILY MEDICINE CLINIC | Age: 77
End: 2022-09-29
Payer: MEDICARE

## 2022-09-29 VITALS
TEMPERATURE: 98.2 F | DIASTOLIC BLOOD PRESSURE: 62 MMHG | WEIGHT: 176.6 LBS | OXYGEN SATURATION: 97 % | HEIGHT: 67 IN | RESPIRATION RATE: 16 BRPM | BODY MASS INDEX: 27.72 KG/M2 | SYSTOLIC BLOOD PRESSURE: 138 MMHG | HEART RATE: 57 BPM

## 2022-09-29 DIAGNOSIS — I48.20 CHRONIC ATRIAL FIBRILLATION (HCC): ICD-10-CM

## 2022-09-29 DIAGNOSIS — E11.21 TYPE 2 DIABETES WITH NEPHROPATHY (HCC): Primary | ICD-10-CM

## 2022-09-29 DIAGNOSIS — N18.31 STAGE 3A CHRONIC KIDNEY DISEASE (HCC): ICD-10-CM

## 2022-09-29 DIAGNOSIS — Z23 NEEDS FLU SHOT: ICD-10-CM

## 2022-09-29 DIAGNOSIS — E03.9 ACQUIRED HYPOTHYROIDISM: ICD-10-CM

## 2022-09-29 DIAGNOSIS — I10 PRIMARY HYPERTENSION: ICD-10-CM

## 2022-09-29 PROCEDURE — G0008 ADMIN INFLUENZA VIRUS VAC: HCPCS | Performed by: INTERNAL MEDICINE

## 2022-09-29 PROCEDURE — 99214 OFFICE O/P EST MOD 30 MIN: CPT | Performed by: INTERNAL MEDICINE

## 2022-09-29 PROCEDURE — 90694 VACC AIIV4 NO PRSRV 0.5ML IM: CPT | Performed by: INTERNAL MEDICINE

## 2022-09-29 NOTE — PATIENT INSTRUCTIONS
Vaccine Information Statement    Influenza (Flu) Vaccine (Inactivated or Recombinant): What You Need to Know    Many vaccine information statements are available in French and other languages. See www.immunize.org/vis. Hojas de información sobre vacunas están disponibles en español y en muchos otros idiomas. Visite www.immunize.org/vis. 1. Why get vaccinated? Influenza vaccine can prevent influenza (flu). Flu is a contagious disease that spreads around the [facility] every year, usually between October and May. Anyone can get the flu, but it is more dangerous for some people. Infants and young children, people 72 years and older, pregnant people, and people with certain health conditions or a weakened immune system are at greatest risk of flu complications. Pneumonia, bronchitis, sinus infections, and ear infections are examples of flu-related complications. If you have a medical condition, such as heart disease, cancer, or diabetes, flu can make it worse. Flu can cause fever and chills, sore throat, muscle aches, fatigue, cough, headache, and runny or stuffy nose. Some people may have vomiting and diarrhea, though this is more common in children than adults. In an average year, thousands of people in the [facility] die from flu, and many more are hospitalized. Flu vaccine prevents millions of illnesses and flu-related visits to the doctor each year. 2. Influenza vaccines     CDC recommends everyone 6 months and older get vaccinated every flu season. Children 6 months through 6years of age may need 2 doses during a single flu season. Everyone else needs only 1 dose each flu season. It takes about 2 weeks for protection to develop after vaccination. There are many flu viruses, and they are always changing. Each year a new flu vaccine is made to protect against the influenza viruses believed to be likely to cause disease in the upcoming flu season.  Even when the vaccine doesnt exactly match these viruses, it may still provide some protection. Influenza vaccine does not cause flu. Influenza vaccine may be given at the same time as other vaccines. 3. Talk with your health care provider    Tell your vaccination provider if the person getting the vaccine:  Has had an allergic reaction after a previous dose of influenza vaccine, or has any severe, life-threatening allergies   Has ever had Guillain-Barré Syndrome (also called GBS)    In some cases, your health care provider may decide to postpone influenza vaccination until a future visit. Influenza vaccine can be administered at any time during pregnancy. People who are or will be pregnant during influenza season should receive inactivated influenza vaccine. People with minor illnesses, such as a cold, may be vaccinated. People who are moderately or severely ill should usually wait until they recover before getting influenza vaccine. Your health care provider can give you more information. 4. Risks of a vaccine reaction    Soreness, redness, and swelling where the shot is given, fever, muscle aches, and headache can happen after influenza vaccination. There may be a very small increased risk of Guillain-Barré Syndrome (GBS) after inactivated influenza vaccine (the flu shot). Rola Rabago children who get the flu shot along with pneumococcal vaccine (PCV13) and/or DTaP vaccine at the same time might be slightly more likely to have a seizure caused by fever. Tell your health care provider if a child who is getting flu vaccine has ever had a seizure. People sometimes faint after medical procedures, including vaccination. Tell your provider if you feel dizzy or have vision changes or ringing in the ears. As with any medicine, there is a very remote chance of a vaccine causing a severe allergic reaction, other serious injury, or death. 5. What if there is a serious problem?     An allergic reaction could occur after the vaccinated person leaves the clinic. If you see signs of a severe allergic reaction (hives, swelling of the face and throat, difficulty breathing, a fast heartbeat, dizziness, or weakness), call 9-1-1 and get the person to the nearest hospital.    For other signs that concern you, call your health care provider. Adverse reactions should be reported to the Vaccine Adverse Event Reporting System (VAERS). Your health care provider will usually file this report, or you can do it yourself. Visit the VAERS website at www.vaers. [facility].gov or call 1-872.960.2361. VAERS is only for reporting reactions, and VAERS staff members do not give medical advice. 6. The National Vaccine Injury Compensation Program    The [facility] Vaccine Injury Compensation Program (VICP) is a federal program that was created to compensate people who may have been injured by certain vaccines. Claims regarding alleged injury or death due to vaccination have a time limit for filing, which may be as short as two years. Visit the VICP website at www.[facility].gov/vaccinecompensation or call 8-209.973.8853 to learn about the program and about filing a claim. 7. How can I learn more? Ask your health care provider. Call your local or state health department. Visit the website of the Food and Drug Administration (FDA) for vaccine package inserts and additional information at www.fda.gov/vaccines-blood-biologics/vaccines. Contact the Centers for Disease Control and Prevention (CDC): Call 6-131.921.3270 (1-800-CDC-INFO) or  Visit CDCs influenza website at www.cdc.gov/flu. Vaccine Information Statement   Inactivated Influenza Vaccine   8/6/2021  42 FARHAN Daniels [id number]   Department of Health and Human Services  Centers for Disease Control and Prevention    Office Use Only

## 2022-09-29 NOTE — PROGRESS NOTES
Catherine Faith is a 68 y.o. female presenting for/with:    Chief Complaint   Patient presents with    Diabetes     Does not routinely check glucoses    Hypertension    Immunization/Injection     Flu vaccine provided. Will get COVID in 2 weeks       Visit Vitals  /62 (BP 1 Location: Left upper arm, BP Patient Position: Sitting, BP Cuff Size: Adult long)   Pulse (!) 57   Temp 98.2 °F (36.8 °C) (Temporal)   Resp 16   Ht 5' 7\" (1.702 m)   Wt 176 lb 9.6 oz (80.1 kg)   SpO2 97%   BMI 27.66 kg/m²     Pain Scale: 0 - No pain/10  Pain Location:     1. \"Have you been to the ER, urgent care clinic since your last visit? Hospitalized since your last visit? \" No    2. \"Have you seen or consulted any other health care providers outside of the [address] [place] [address] since your last visit? \" No     3. For patients aged 39-70: Has the patient had a colonoscopy / FIT/ Cologuard? NA - based on age      If the patient is female:    4. For patients aged 41-77: Has the patient had a mammogram within the past 2 years? NA - based on age or sex      11. For patients aged 21-65: Has the patient had a pap smear? NA - based on age or sex      Learning Assessment 3/15/2022   PRIMARY LEARNER Patient   BARRIERS PRIMARY LEARNER -   CO-LEARNER CAREGIVER -   PRIMARY LANGUAGE ENGLISH   LEARNER PREFERENCE PRIMARY READING     -   ANSWERED BY patient   RELATIONSHIP SELF     Fall Risk Assessment, last 12 mths 9/29/2022   Able to walk? Yes   Fall in past 12 months? 0   Do you feel unsteady? 0   Are you worried about falling 0   Is TUG test greater than 12 seconds? -   Is the gait abnormal? -   Number of falls in past 12 months -   Fall with injury? -       3 most recent PHQ Screens 9/29/2022   Little interest or pleasure in doing things Not at all   Feeling down, depressed, irritable, or hopeless Not at all   Total Score PHQ 2 0     Abuse Screening Questionnaire 9/29/2022   Do you ever feel afraid of your partner?  N   Are you in a relationship with someone who physically or mentally threatens you? N   Is it safe for you to go home? Y       ADL Assessment 5/27/2022   Feeding yourself No Help Needed   Getting from bed to chair No Help Needed   Getting dressed No Help Needed   Bathing or showering No Help Needed   Walk across the room (includes cane/walker) No Help Needed   Using the telphone No Help Needed   Taking your medications No Help Needed   Preparing meals No Help Needed   Managing money (expenses/bills) No Help Needed   Moderately strenuous housework (laundry) No Help Needed   Shopping for personal items (toiletries/medicines) No Help Needed   Shopping for groceries No Help Needed   Driving No Help Needed   Climbing a flight of stairs No Help Needed   Getting to places beyond walking distances No Help Needed      Advance Care Planning 3/15/2022   Patient's Healthcare Decision Maker is: Legal Next of Kin   Confirm Advance Directive None   Patient Would Like to Complete Advance Directive No      After obtaining consent, and per orders of Dr. Monique Chauhan, injection of Fluad to (L) deltoid given by Zach Lundy. Patient instructed to remain in clinic for 20 minutes afterwards, and to report any adverse reaction to me immediately.

## 2022-09-30 LAB
ALBUMIN SERPL-MCNC: 4.6 G/DL (ref 3.5–5)
ALBUMIN/GLOB SERPL: 1.4 {RATIO} (ref 1.1–2.2)
ALP SERPL-CCNC: 91 U/L (ref 45–117)
ALT SERPL-CCNC: 21 U/L (ref 12–78)
ANION GAP SERPL CALC-SCNC: 3 MMOL/L (ref 5–15)
AST SERPL-CCNC: 14 U/L (ref 15–37)
BASOPHILS # BLD: 0.1 K/UL (ref 0–0.1)
BASOPHILS NFR BLD: 1 % (ref 0–1)
BILIRUB SERPL-MCNC: 0.4 MG/DL (ref 0.2–1)
BUN SERPL-MCNC: 27 MG/DL (ref 6–20)
BUN/CREAT SERPL: 25 (ref 12–20)
CALCIUM SERPL-MCNC: 10.1 MG/DL (ref 8.5–10.1)
CHLORIDE SERPL-SCNC: 104 MMOL/L (ref 97–108)
CHOLEST SERPL-MCNC: 185 MG/DL
CO2 SERPL-SCNC: 31 MMOL/L (ref 21–32)
CREAT SERPL-MCNC: 1.09 MG/DL (ref 0.55–1.02)
DIFFERENTIAL METHOD BLD: NORMAL
EOSINOPHIL # BLD: 0.3 K/UL (ref 0–0.4)
EOSINOPHIL NFR BLD: 4 % (ref 0–7)
ERYTHROCYTE [DISTWIDTH] IN BLOOD BY AUTOMATED COUNT: 12.4 % (ref 11.5–14.5)
EST. AVERAGE GLUCOSE BLD GHB EST-MCNC: 154 MG/DL
GLOBULIN SER CALC-MCNC: 3.4 G/DL (ref 2–4)
GLUCOSE SERPL-MCNC: 112 MG/DL (ref 65–100)
HBA1C MFR BLD: 7 % (ref 4–5.6)
HCT VFR BLD AUTO: 39.3 % (ref 35–47)
HDLC SERPL-MCNC: 69 MG/DL
HDLC SERPL: 2.7 {RATIO} (ref 0–5)
HGB BLD-MCNC: 12.6 G/DL (ref 11.5–16)
IMM GRANULOCYTES # BLD AUTO: 0 K/UL (ref 0–0.04)
IMM GRANULOCYTES NFR BLD AUTO: 0 % (ref 0–0.5)
LDLC SERPL CALC-MCNC: 95.8 MG/DL (ref 0–100)
LYMPHOCYTES # BLD: 2.8 K/UL (ref 0.8–3.5)
LYMPHOCYTES NFR BLD: 34 % (ref 12–49)
MCH RBC QN AUTO: 30.2 PG (ref 26–34)
MCHC RBC AUTO-ENTMCNC: 32.1 G/DL (ref 30–36.5)
MCV RBC AUTO: 94.2 FL (ref 80–99)
MONOCYTES # BLD: 0.8 K/UL (ref 0–1)
MONOCYTES NFR BLD: 9 % (ref 5–13)
NEUTS SEG # BLD: 4.5 K/UL (ref 1.8–8)
NEUTS SEG NFR BLD: 52 % (ref 32–75)
NRBC # BLD: 0 K/UL (ref 0–0.01)
NRBC BLD-RTO: 0 PER 100 WBC
PLATELET # BLD AUTO: 299 K/UL (ref 150–400)
PMV BLD AUTO: 10.7 FL (ref 8.9–12.9)
POTASSIUM SERPL-SCNC: 4.2 MMOL/L (ref 3.5–5.1)
PROT SERPL-MCNC: 8 G/DL (ref 6.4–8.2)
RBC # BLD AUTO: 4.17 M/UL (ref 3.8–5.2)
SODIUM SERPL-SCNC: 138 MMOL/L (ref 136–145)
T4 SERPL-MCNC: 12.4 UG/DL (ref 4.8–13.9)
TRIGL SERPL-MCNC: 101 MG/DL (ref ?–150)
TSH SERPL DL<=0.05 MIU/L-ACNC: 0.25 UIU/ML (ref 0.36–3.74)
VLDLC SERPL CALC-MCNC: 20.2 MG/DL
WBC # BLD AUTO: 8.5 K/UL (ref 3.6–11)

## 2022-10-01 PROBLEM — E06.3 HASHIMOTO'S THYROIDITIS: Status: ACTIVE | Noted: 2022-09-30

## 2022-10-01 LAB
THYROGLOB AB SERPL-ACNC: <1 IU/ML (ref 0–0.9)
THYROPEROXIDASE AB SERPL-ACNC: 154 IU/ML (ref 0–34)

## 2022-12-01 ENCOUNTER — TELEPHONE (OUTPATIENT)
Dept: SLEEP MEDICINE | Age: 77
End: 2022-12-01

## 2022-12-01 NOTE — TELEPHONE ENCOUNTER
LVM to reschedule patient's appointment at [address] on 2/2/23 due to NPJ out of the office. Patient does have remote access if she would like to make a VV appointment. Openings available.

## 2023-01-06 DIAGNOSIS — E78.5 HYPERLIPIDEMIA, UNSPECIFIED HYPERLIPIDEMIA TYPE: ICD-10-CM

## 2023-01-06 RX ORDER — METFORMIN HYDROCHLORIDE 500 MG/1
TABLET ORAL
Qty: 270 TABLET | Refills: 3 | Status: SHIPPED | OUTPATIENT
Start: 2023-01-06

## 2023-01-06 RX ORDER — ROSUVASTATIN CALCIUM 10 MG/1
10 TABLET, COATED ORAL
Qty: 90 TABLET | Refills: 3 | Status: SHIPPED | OUTPATIENT
Start: 2023-01-06

## 2023-02-05 DIAGNOSIS — E03.9 ACQUIRED HYPOTHYROIDISM: ICD-10-CM

## 2023-02-05 DIAGNOSIS — E11.21 TYPE 2 DIABETES WITH NEPHROPATHY (HCC): Primary | ICD-10-CM

## 2023-02-05 DIAGNOSIS — I10 PRIMARY HYPERTENSION: ICD-10-CM

## 2023-02-05 NOTE — PROGRESS NOTES
Ms. Johnny Gutierrez is a 66 y.o. female who is here for evaluation of   Chief Complaint   Patient presents with    Annual Wellness Visit    Irregular Heart Beat     Has seen Dr Ruby Dominique recently. Had Echo completed. Due to revisit in August. States is being followed for a valve replacement. Diabetes    Immunization/Injection     VIIS verified. Labs     Review labs. Hypertension     Routine F/U. [facility] ASSESSMENT AND PLAN:    1. Medicare annual wellness visit, subsequent  2. Type 2 diabetes with nephropathy (HCC)  Good control  3. Chronic atrial fibrillation (HCC)  Anticoagulated  - metoprolol succinate (TOPROL-XL) 100 mg tablet; TAKE 1 TABLET DAILY FOR    HEART AND PRESSURE         (REPLACES METOPROLOL       TARTRATE)  Dispense: 90 Tablet; Refill: 3  - apixaban (Eliquis) 5 mg tablet; TAKE 1 TABLET TWICE A DAY  Dispense: 180 Tablet; Refill: 3    4. Stage 3a chronic kidney disease ([facility] [address]  stable    5. Acquired hypothyroidism  On Saturdays and Sundays only take one half of the levothyroxine tablet. Check TSH after 6 weeks  - levothyroxine (SYNTHROID) 112 mcg tablet; Take 1 Tablet by mouth Daily (before breakfast). Dispense: 90 Tablet; Refill: 4    6. Primary hypertension  Good control  - losartan-hydroCHLOROthiazide (HYZAAR) 100-25 mg per tablet; Take 1 Tablet by mouth nightly. Dispense: 90 Tablet; Refill: 3  - amLODIPine (NORVASC) 5 mg tablet; Take 1 Tablet by mouth daily. Dispense: 90 Tablet; Refill: 4  - amLODIPine (NORVASC) 2.5 mg tablet; TAKE 1 TABLET DAILY ALONG  WITH 5MG FOR TOTAL OF 7.5MGDAILY  Dispense: 90 Tablet; Refill: 3    7. Aortic stenosis: I reviewed her echo with her today. Her aortic valve area is 1.1 cm² and her calculated mean pressure gradient across her aortic valve is 22 mmHg. Currently not symptomatic but would be a good candidate for TAVR. Followed by Dr. Ruby Dominique up at [facility].   Orders Placed This Encounter    levothyroxine (SYNTHROID) 112 mcg tablet     Sig: Take 1 Tablet by mouth Daily (before breakfast). Dispense:  90 Tablet     Refill:  4    losartan-hydroCHLOROthiazide (HYZAAR) 100-25 mg per tablet     Sig: Take 1 Tablet by mouth nightly. Dispense:  90 Tablet     Refill:  3    metoprolol succinate (TOPROL-XL) 100 mg tablet     Sig: TAKE 1 TABLET DAILY FOR    HEART AND PRESSURE         (REPLACES METOPROLOL       TARTRATE)     Dispense:  90 Tablet     Refill:  3    sertraline (ZOLOFT) 50 mg tablet     Sig: Take 1 Tablet by mouth daily. Dispense:  90 Tablet     Refill:  3    amLODIPine (NORVASC) 5 mg tablet     Sig: Take 1 Tablet by mouth daily. Dispense:  90 Tablet     Refill:  4    amLODIPine (NORVASC) 2.5 mg tablet     Sig: TAKE 1 TABLET DAILY ALONG  WITH 5MG FOR TOTAL OF 7.5MGDAILY     Dispense:  90 Tablet     Refill:  3    apixaban (Eliquis) 5 mg tablet     Sig: TAKE 1 TABLET TWICE A DAY     Dispense:  180 Tablet     Refill:  3           HPI  6year-old woman who has a history of chronic atrial fibrillation anticoagulated with Eliquis, hypertension treated with losartan-HCTZ and metoprolol with amlodipine, type 2 diabetes treated with metformin and finally hyperlipidemia for which she takes rosuvastatin 10 mg daily arrives for interval assessment.      Blood pressure:  stable on meds:    Current Outpatient Medications   Medication Instructions    amLODIPine (NORVASC) 2.5 mg tablet TAKE 1 TABLET DAILY ALONG  WITH 5MG FOR TOTAL OF 7.5MGDAILY    amLODIPine (NORVASC) 5 mg, Oral, DAILY    apixaban (Eliquis) 5 mg tablet TAKE 1 TABLET TWICE A DAY    BLOOD SUGAR DIAGNOSTIC (ONE TOUCH TEST) Does Not Apply, DAILY    glucose blood VI test strips (OneTouch Ultra Test) strip Does Not Apply, DAILY, Use to test glucose daily E 11.9    glucose blood VI test strips (OneTouch Verio test strips) strip USE 1 STRIP TO CHECK GLUCOSE ONCE DAILY    lancets (One Touch Delica) 33 gauge misc USE 1 LANCET EACH TIME TO TEST BLOOD SUGAR DAILY    levothyroxine (SYNTHROID) 112 mcg, Oral, DAILY BEFORE BREAKFAST    losartan-hydroCHLOROthiazide (HYZAAR) 100-25 mg per tablet 1 Tablet, Oral, EVERY BEDTIME    metFORMIN (GLUCOPHAGE) 500 mg tablet TAKE 1 TABLET 3 TIMES DAILYWITH MEALS    metoprolol succinate (TOPROL-XL) 100 mg tablet TAKE 1 TABLET DAILY FOR    HEART AND PRESSURE         (REPLACES METOPROLOL       TARTRATE)    rosuvastatin (CRESTOR) 10 mg, Oral, EVERY BEDTIME    sertraline (ZOLOFT) 50 mg, Oral, DAILY    therapeutic multivitamin (THERAGRAN) tablet 1 Tablet, Oral, DAILY     BP Readings from Last 3 Encounters:   09/29/22 138/62   05/30/22 (!) 171/71   05/27/22 (!) 154/60        History of hypothyroidism. ([place])  Lab Results   Component Value Date/Time    TSH 0.25 (L) 09/29/2022 11:32 AM        She is followed by Dr. Carmen Gil. She has had an ablation for her atrial fibrillation. She has aortic stenosis and her last recorded [facility] echo was August 2021 which demonstrated an aortic valve area of 1.1 cm², a peak gradient of 31 mmHg and a mean gradient of 20 mmHg. She was noted to have significant diastolic dysfunction. Diabetes-  Lab Results   Component Value Date/Time    Hemoglobin A1c 7.0 (H) 09/29/2022 11:32 AM        ROS:  Denies  fever, chills, cough, chest pain, SOB,  nausea, vomiting, or diarrhea. Denies wt loss, wt gain, hemoptysis, hematochezia or melena. Physical Examination:    Visit Vitals  /60 (BP 1 Location: Left upper arm, BP Patient Position: Sitting, BP Cuff Size: Adult long)   Pulse (!) 57   Resp 17   Ht 5' 7\" (1.702 m)   Wt 179 lb 12.8 oz (81.6 kg)   SpO2 97%   BMI 28.16 kg/m²      General:  Alert, cooperative, no distress. Head:  Normocephalic, without obvious abnormality, atraumatic. Eyes:  Conjunctivae/corneas clear. Pupils equal, round, reactive to light. Extraocular movements intact. Lungs:   Clear to auscultation bilaterally. Chest wall:  No tenderness or deformity. Cardiac:  RRR 3/6 TOM   Abdomen:   Soft, non-tender.  Bowel sounds normal. No masses. No organomegaly. Extremities: Extremities normal, atraumatic, no cyanosis or edema. Pulses: 2+ and symmetric all extremities. Skin: Skin color, texture, turgor normal. No rashes or lesions. Lymph nodes: Cervical, supraclavicular, and axillary nodes normal.   Neurologic: CNII-XII intact. Normal strength, sensation, and reflexes throughout. On this date 02/09/2023 I have spent 30 minutes reviewing previous notes, test results and face to face with the patient discussing the diagnosis and importance of compliance with the treatment plan as well as documenting on the day of the visit.     Anthony Cosby MD FACP    (signed electronically) on 2/9/2023 at 10:31 AM

## 2023-02-06 ENCOUNTER — APPOINTMENT (OUTPATIENT)
Dept: FAMILY MEDICINE CLINIC | Age: 78
End: 2023-02-06

## 2023-02-06 DIAGNOSIS — I10 PRIMARY HYPERTENSION: ICD-10-CM

## 2023-02-06 DIAGNOSIS — E03.9 ACQUIRED HYPOTHYROIDISM: ICD-10-CM

## 2023-02-06 DIAGNOSIS — E11.21 TYPE 2 DIABETES WITH NEPHROPATHY (HCC): ICD-10-CM

## 2023-02-07 LAB
ALBUMIN SERPL-MCNC: 4.3 G/DL (ref 3.5–5)
ALBUMIN/GLOB SERPL: 1.3 (ref 1.1–2.2)
ALP SERPL-CCNC: 91 U/L (ref 45–117)
ALT SERPL-CCNC: 18 U/L (ref 12–78)
ANION GAP SERPL CALC-SCNC: 5 MMOL/L (ref 5–15)
AST SERPL-CCNC: 14 U/L (ref 15–37)
BASOPHILS # BLD: 0.1 K/UL (ref 0–0.1)
BASOPHILS NFR BLD: 1 % (ref 0–1)
BILIRUB SERPL-MCNC: 0.4 MG/DL (ref 0.2–1)
BUN SERPL-MCNC: 25 MG/DL (ref 6–20)
BUN/CREAT SERPL: 23 (ref 12–20)
CALCIUM SERPL-MCNC: 9.5 MG/DL (ref 8.5–10.1)
CHLORIDE SERPL-SCNC: 102 MMOL/L (ref 97–108)
CHOLEST SERPL-MCNC: 212 MG/DL
CO2 SERPL-SCNC: 30 MMOL/L (ref 21–32)
CREAT SERPL-MCNC: 1.1 MG/DL (ref 0.55–1.02)
CREAT UR-MCNC: 57.4 MG/DL
DIFFERENTIAL METHOD BLD: NORMAL
EOSINOPHIL # BLD: 0.2 K/UL (ref 0–0.4)
EOSINOPHIL NFR BLD: 3 % (ref 0–7)
ERYTHROCYTE [DISTWIDTH] IN BLOOD BY AUTOMATED COUNT: 12.3 % (ref 11.5–14.5)
EST. AVERAGE GLUCOSE BLD GHB EST-MCNC: 151 MG/DL
GLOBULIN SER CALC-MCNC: 3.3 G/DL (ref 2–4)
GLUCOSE SERPL-MCNC: 154 MG/DL (ref 65–100)
HBA1C MFR BLD: 6.9 % (ref 4–5.6)
HCT VFR BLD AUTO: 39 % (ref 35–47)
HDLC SERPL-MCNC: 68 MG/DL
HDLC SERPL: 3.1 (ref 0–5)
HGB BLD-MCNC: 12 G/DL (ref 11.5–16)
IMM GRANULOCYTES # BLD AUTO: 0 K/UL (ref 0–0.04)
IMM GRANULOCYTES NFR BLD AUTO: 0 % (ref 0–0.5)
LDLC SERPL CALC-MCNC: 123.6 MG/DL (ref 0–100)
LYMPHOCYTES # BLD: 2.4 K/UL (ref 0.8–3.5)
LYMPHOCYTES NFR BLD: 38 % (ref 12–49)
MCH RBC QN AUTO: 28.8 PG (ref 26–34)
MCHC RBC AUTO-ENTMCNC: 30.8 G/DL (ref 30–36.5)
MCV RBC AUTO: 93.8 FL (ref 80–99)
MICROALBUMIN UR-MCNC: 1.78 MG/DL
MICROALBUMIN/CREAT UR-RTO: 31 MG/G (ref 0–30)
MONOCYTES # BLD: 0.6 K/UL (ref 0–1)
MONOCYTES NFR BLD: 9 % (ref 5–13)
NEUTS SEG # BLD: 3 K/UL (ref 1.8–8)
NEUTS SEG NFR BLD: 49 % (ref 32–75)
NRBC # BLD: 0 K/UL (ref 0–0.01)
NRBC BLD-RTO: 0 PER 100 WBC
PLATELET # BLD AUTO: 274 K/UL (ref 150–400)
PMV BLD AUTO: 11 FL (ref 8.9–12.9)
POTASSIUM SERPL-SCNC: 3.9 MMOL/L (ref 3.5–5.1)
PROT SERPL-MCNC: 7.6 G/DL (ref 6.4–8.2)
RBC # BLD AUTO: 4.16 M/UL (ref 3.8–5.2)
SODIUM SERPL-SCNC: 137 MMOL/L (ref 136–145)
TRIGL SERPL-MCNC: 102 MG/DL (ref ?–150)
TSH SERPL DL<=0.05 MIU/L-ACNC: 0.2 UIU/ML (ref 0.36–3.74)
VLDLC SERPL CALC-MCNC: 20.4 MG/DL
WBC # BLD AUTO: 6.2 K/UL (ref 3.6–11)

## 2023-02-09 ENCOUNTER — OFFICE VISIT (OUTPATIENT)
Dept: FAMILY MEDICINE CLINIC | Age: 78
End: 2023-02-09
Payer: MEDICARE

## 2023-02-09 VITALS
BODY MASS INDEX: 28.22 KG/M2 | DIASTOLIC BLOOD PRESSURE: 60 MMHG | SYSTOLIC BLOOD PRESSURE: 128 MMHG | WEIGHT: 179.8 LBS | OXYGEN SATURATION: 97 % | RESPIRATION RATE: 17 BRPM | HEIGHT: 67 IN | HEART RATE: 57 BPM

## 2023-02-09 DIAGNOSIS — E11.21 TYPE 2 DIABETES WITH NEPHROPATHY (HCC): ICD-10-CM

## 2023-02-09 DIAGNOSIS — E03.9 ACQUIRED HYPOTHYROIDISM: ICD-10-CM

## 2023-02-09 DIAGNOSIS — Z00.00 MEDICARE ANNUAL WELLNESS VISIT, SUBSEQUENT: Primary | ICD-10-CM

## 2023-02-09 DIAGNOSIS — N18.31 STAGE 3A CHRONIC KIDNEY DISEASE (HCC): ICD-10-CM

## 2023-02-09 DIAGNOSIS — I10 PRIMARY HYPERTENSION: ICD-10-CM

## 2023-02-09 DIAGNOSIS — I48.20 CHRONIC ATRIAL FIBRILLATION (HCC): ICD-10-CM

## 2023-02-09 PROCEDURE — 1090F PRES/ABSN URINE INCON ASSESS: CPT | Performed by: INTERNAL MEDICINE

## 2023-02-09 PROCEDURE — 1101F PT FALLS ASSESS-DOCD LE1/YR: CPT | Performed by: INTERNAL MEDICINE

## 2023-02-09 PROCEDURE — 3044F HG A1C LEVEL LT 7.0%: CPT | Performed by: INTERNAL MEDICINE

## 2023-02-09 PROCEDURE — G8417 CALC BMI ABV UP PARAM F/U: HCPCS | Performed by: INTERNAL MEDICINE

## 2023-02-09 PROCEDURE — 1123F ACP DISCUSS/DSCN MKR DOCD: CPT | Performed by: INTERNAL MEDICINE

## 2023-02-09 PROCEDURE — 3078F DIAST BP <80 MM HG: CPT | Performed by: INTERNAL MEDICINE

## 2023-02-09 PROCEDURE — G8427 DOCREV CUR MEDS BY ELIG CLIN: HCPCS | Performed by: INTERNAL MEDICINE

## 2023-02-09 PROCEDURE — G8536 NO DOC ELDER MAL SCRN: HCPCS | Performed by: INTERNAL MEDICINE

## 2023-02-09 PROCEDURE — G8510 SCR DEP NEG, NO PLAN REQD: HCPCS | Performed by: INTERNAL MEDICINE

## 2023-02-09 PROCEDURE — 99214 OFFICE O/P EST MOD 30 MIN: CPT | Performed by: INTERNAL MEDICINE

## 2023-02-09 PROCEDURE — G0439 PPPS, SUBSEQ VISIT: HCPCS | Performed by: INTERNAL MEDICINE

## 2023-02-09 PROCEDURE — G8399 PT W/DXA RESULTS DOCUMENT: HCPCS | Performed by: INTERNAL MEDICINE

## 2023-02-09 PROCEDURE — 3074F SYST BP LT 130 MM HG: CPT | Performed by: INTERNAL MEDICINE

## 2023-02-09 RX ORDER — LOSARTAN POTASSIUM AND HYDROCHLOROTHIAZIDE 25; 100 MG/1; MG/1
1 TABLET ORAL
Qty: 90 TABLET | Refills: 3 | Status: SHIPPED | OUTPATIENT
Start: 2023-02-09

## 2023-02-09 RX ORDER — SERTRALINE HYDROCHLORIDE 50 MG/1
50 TABLET, FILM COATED ORAL DAILY
Qty: 90 TABLET | Refills: 3 | Status: SHIPPED | OUTPATIENT
Start: 2023-02-09

## 2023-02-09 RX ORDER — LEVOTHYROXINE SODIUM 112 UG/1
112 TABLET ORAL
Qty: 90 TABLET | Refills: 4 | Status: SHIPPED | OUTPATIENT
Start: 2023-02-09

## 2023-02-09 RX ORDER — METOPROLOL SUCCINATE 100 MG/1
TABLET, EXTENDED RELEASE ORAL
Qty: 90 TABLET | Refills: 3 | Status: SHIPPED | OUTPATIENT
Start: 2023-02-09

## 2023-02-09 RX ORDER — AMLODIPINE BESYLATE 2.5 MG/1
TABLET ORAL
Qty: 90 TABLET | Refills: 3 | Status: SHIPPED | OUTPATIENT
Start: 2023-02-09

## 2023-02-09 RX ORDER — AMLODIPINE BESYLATE 5 MG/1
5 TABLET ORAL DAILY
Qty: 90 TABLET | Refills: 4 | Status: SHIPPED | OUTPATIENT
Start: 2023-02-09

## 2023-02-09 NOTE — PROGRESS NOTES
Isabelle Smith is a 66 y.o. female presenting for/with:    Chief Complaint   Patient presents with    Annual Wellness Visit    Irregular Heart Beat     Has seen Dr Red Goddard recently. Had Echo completed. Due to revisit in August. States is being followed for a valve replacement. Diabetes    Immunization/Injection     VIIS verified. Labs     Review labs. Hypertension     Routine F/U. Visit Vitals  /60 (BP 1 Location: Left upper arm, BP Patient Position: Sitting, BP Cuff Size: Adult long)   Pulse (!) 57   Resp 17   Ht 5' 7\" (1.702 m)   Wt 179 lb 12.8 oz (81.6 kg)   SpO2 97%   BMI 28.16 kg/m²     Pain Scale: 0 - No pain/10  Pain Location:     1. \"Have you been to the ER, urgent care clinic since your last visit? Hospitalized since your last visit? \" No    2. \"Have you seen or consulted any other health care providers outside of the [address] [place] [address] since your last visit? \" Yes [facility]      3. For patients aged 39-70: Has the patient had a colonoscopy / FIT/ Cologuard? NA - based on age      If the patient is female:    4. For patients aged 41-77: Has the patient had a mammogram within the past 2 years? NA - based on age or sex      11. For patients aged 21-65: Has the patient had a pap smear? NA - based on age or sex      Learning Assessment 2/9/2023   PRIMARY LEARNER Patient   BARRIERS PRIMARY LEARNER -   CO-LEARNER CAREGIVER -   PRIMARY LANGUAGE ENGLISH   LEARNER PREFERENCE PRIMARY LISTENING     -   ANSWERED BY self   RELATIONSHIP SELF     Fall Risk Assessment, last 12 mths 2/9/2023   Able to walk? Yes   Fall in past 12 months? 0   Do you feel unsteady? 0   Are you worried about falling 0   Is TUG test greater than 12 seconds? -   Is the gait abnormal? -   Number of falls in past 12 months -   Fall with injury?  -       3 most recent PHQ Screens 2/9/2023   Little interest or pleasure in doing things Not at all   Feeling down, depressed, irritable, or hopeless Not at all   Total Score PHQ 2 0     Abuse Screening Questionnaire 2/9/2023   Do you ever feel afraid of your partner? N   Are you in a relationship with someone who physically or mentally threatens you? N   Is it safe for you to go home? Y       ADL Assessment 2/9/2023   Feeding yourself No Help Needed   Getting from bed to chair No Help Needed   Getting dressed No Help Needed   Bathing or showering No Help Needed   Walk across the room (includes cane/walker) No Help Needed   Using the telphone No Help Needed   Taking your medications No Help Needed   Preparing meals No Help Needed   Managing money (expenses/bills) No Help Needed   Moderately strenuous housework (laundry) No Help Needed   Shopping for personal items (toiletries/medicines) No Help Needed   Shopping for groceries No Help Needed   Driving No Help Needed   Climbing a flight of stairs No Help Needed   Getting to places beyond walking distances No Help Needed      Advance Care Planning 2/9/2023   Patient's Healthcare Decision Maker is: Legal Next of Kin   Confirm Advance Directive None   Patient Would Like to Complete Advance Directive No      This is the Subsequent Medicare Annual Wellness Exam, performed 12 months or more after the Initial AWV or the last Subsequent AWV    I have reviewed the patient's medical history in detail and updated the computerized patient record. Assessment/Plan   Education and counseling provided:  Are appropriate based on today's review and evaluation    1. Medicare annual wellness visit, subsequent  2. Type 2 diabetes with nephropathy ([facility] [address]  3.  Chronic atrial fibrillation (HCC)  4. Stage 3a chronic kidney disease (HCC)       Depression Risk Factor Screening     3 most recent PHQ Screens 2/9/2023   Little interest or pleasure in doing things Not at all   Feeling down, depressed, irritable, or hopeless Not at all   Total Score PHQ 2 0       Alcohol & Drug Abuse Risk Screen    Do you average more than 1 drink per night or more than 7 drinks a week:  No    On any one occasion in the past three months have you have had more than 3 drinks containing alcohol:  No          Functional Ability and Level of Safety    Hearing: Hearing is good. Activities of Daily Living: The home contains: no safety equipment. Patient does total self care      Ambulation: with no difficulty     Fall Risk:  Fall Risk Assessment, last 12 mths 2/9/2023   Able to walk? Yes   Fall in past 12 months? 0   Do you feel unsteady? 0   Are you worried about falling 0   Is TUG test greater than 12 seconds? -   Is the gait abnormal? -   Number of falls in past 12 months -   Fall with injury?  -      Abuse Screen:  Patient is not abused       Cognitive Screening    Has your family/caregiver stated any concerns about your memory: no     Health Maintenance Due     Health Maintenance Due   Topic Date Due    Eye Exam Retinal or Dilated  09/23/2021    Foot Exam Q1  10/19/2021       Patient Care Team   Patient Care Team:  Leanna Borden MD as PCP - General (Internal Medicine Physician)  Leanna Borden MD as PCP - [facility] Empaneled Provider    History     Patient Active Problem List   Diagnosis Code    Hyperlipidemia E78.5    Chronic atrial fibrillation ([address] I48.20    Cholecystitis T03.3    Aortic systolic murmur on examination I35.8    Hypothyroidism due to acquired atrophy of thyroid E03.4    Essential hypertension I10    Type 2 diabetes with nephropathy ([address] E11.21    Severe obesity ([address] E66.01    Anxiety F41.9    Auditory vertigo H81.319    GERD (gastroesophageal reflux disease) K21.9    Obstructive sleep apnea G47.33    Unspecified osteoarthritis, unspecified site M19.90    Chronic renal disease, stage III N18.30    Hashimoto's thyroiditis E06.3     Past Medical History:   Diagnosis Date    Atrial fibrillation ([address]     Cholecystitis 07/06/2016    Diabetes ([address]     Dizziness     GERD (gastroesophageal reflux disease) 03/26/2014    Hashimoto's thyroiditis 09/30/2022    Positive anti TPO antibodies    Hyperlipidemia     Hypertension     Hypothyroidism     OA (osteoarthritis) of knee     Pain, wrist     Carpal tunnel? Severe obesity ([facility] [address] 04/05/2019    Unspecified osteoarthritis, unspecified site 01/09/2022    Unspecified sleep apnea     CPAP mask      Past Surgical History:   Procedure Laterality Date    HX AFIB ABLATION      HX APPENDECTOMY      HX BREAST LUMPECTOMY  1971    Right breast     HX CHOLECYSTECTOMY  2016    HX HYSTERECTOMY      HX TONSIL AND ADENOIDECTOMY      TN COLONOSCOPY FLX DX W/COLLJ SPEC WHEN PFRMD  11/14/2013          Current Outpatient Medications   Medication Sig Dispense Refill    metFORMIN (GLUCOPHAGE) 500 mg tablet TAKE 1 TABLET 3 TIMES DAILYWITH MEALS 270 Tablet 3    rosuvastatin (CRESTOR) 10 mg tablet Take 1 Tablet by mouth nightly. 90 Tablet 3    sertraline (ZOLOFT) 50 mg tablet Take 1 Tablet by mouth daily. 90 Tablet 3    levothyroxine (SYNTHROID) 112 mcg tablet Take 1 Tablet by mouth Daily (before breakfast). 90 Tablet 4    amLODIPine (NORVASC) 5 mg tablet Take 1 Tablet by mouth daily. 90 Tablet 4    amLODIPine (NORVASC) 2.5 mg tablet TAKE 1 TABLET DAILY ALONG  WITH 5MG FOR TOTAL OF 7.5MGDAILY 90 Tablet 3    apixaban (Eliquis) 5 mg tablet TAKE 1 TABLET TWICE A  Tablet 3    losartan-hydroCHLOROthiazide (HYZAAR) 100-25 mg per tablet Take 1 Tablet by mouth nightly. 90 Tablet 3    metoprolol succinate (TOPROL-XL) 100 mg tablet TAKE 1 TABLET DAILY FOR    HEART AND PRESSURE         (REPLACES METOPROLOL       TARTRATE) 90 Tablet 3    glucose blood VI test strips (OneTouch Verio test strips) strip USE 1 STRIP TO CHECK GLUCOSE ONCE DAILY 100 Strip 4    lancets (One Touch Delica) 33 gauge [facility] USE 1 LANCET EACH TIME TO TEST BLOOD SUGAR DAILY 100 Lancet 5    glucose blood VI test strips (OneTouch Ultra Test) strip by Does Not Apply route daily. Use to test glucose daily E 11.9 100 Strip 4    BLOOD SUGAR DIAGNOSTIC (ONE TOUCH TEST) by Does Not Apply route daily. Indications: One Touch Verioflex Meter      therapeutic multivitamin (THERAGRAN) tablet Take 1 Tab by mouth daily.        Allergies   Allergen Reactions    Penicillins Unknown (comments)       Family History   Problem Relation Age of Onset    Cancer Father         lung    Diabetes Mother      Social History     Tobacco Use    Smoking status: Never    Smokeless tobacco: Never   Substance Use Topics    Alcohol use: No     Alcohol/week: 0.0 standard drinks         Kendall Pagan

## 2023-02-09 NOTE — PATIENT INSTRUCTIONS
Medicare Wellness Visit, Female     The best way to live healthy is to have a lifestyle where you eat a well-balanced diet, exercise regularly, limit alcohol use, and quit all forms of tobacco/nicotine, if applicable. Regular preventive services are another way to keep healthy. Preventive services (vaccines, screening tests, monitoring & exams) can help personalize your care plan, which helps you manage your own care. Screening tests can find health problems at the earliest stages, when they are easiest to treat. [facility] follows the current, evidence-based guidelines published by the [facility] Jere Goldberg ([facility]) when recommending preventive services for our patients. Because we follow these guidelines, sometimes recommendations change over time as research supports it. (For example, mammograms used to be recommended annually. Even though Medicare will still pay for an annual mammogram, the newer guidelines recommend a mammogram every two years for women of average risk). Of course, you and your doctor may decide to screen more often for some diseases, based on your risk and your co-morbidities (chronic disease you are already diagnosed with). Preventive services for you include:  - Medicare offers their members a free annual wellness visit, which is time for you and your primary care provider to discuss and plan for your preventive service needs.  Take advantage of this benefit every year!    -Over the age of 72 should receive the recommended pneumonia vaccines.    -All adults should have a flu vaccine yearly.  -All adults should have a tetanus vaccine every 10 years.   -Over the age 48 should receive the shingles vaccines.        -All adults should be screened once for Hepatitis C.  -All adults age 38-68 who are overweight should have a diabetes screening test once every three years.   -Other screening tests and preventive services for persons with diabetes include: an eye exam to screen for diabetic retinopathy, a kidney function test, a foot exam, and stricter control over your cholesterol.   -Cardiovascular screening for adults with routine risk involves an electrocardiogram (ECG) at intervals determined by your doctor.     -Colorectal cancer screenings should be done for adults age 39-70 with no increased risk factors for colorectal cancer. There are a number of acceptable methods of screening for this type of cancer. Each test has its own benefits and drawbacks. Discuss with your doctor what is most appropriate for you during your annual wellness visit. The different tests include: colonoscopy (considered the best screening method), a fecal occult blood test, a fecal DNA test, and sigmoidoscopy.    -Lung cancer screening is recommended annually with a low dose CT scan for adults between age 54 and 68, who have smoked at least 30 pack years (equivalent of 1 pack per day for 30 days), and who is a current smoker or quit less than 15 years ago.    -A bone mass density test is recommended when a woman turns 65 to screen for osteoporosis. This test is only recommended one time, as a screening. Some providers will use this same test as a disease monitoring tool if you already have osteoporosis. -Breast cancer screenings are recommended every other year for women of normal risk, age 54-69.    -Cervical cancer screenings for women over age 72 are only recommended with certain risk factors.      Here is a list of your current Health Maintenance items (your personalized list of preventive services) with a due date:  Health Maintenance Due   Topic Date Due    Eye Exam  09/23/2021    Diabetic Foot Care  10/19/2021    COVID-19 Vaccine (4 - Booster for [employer] series) 01/10/2022

## 2023-02-22 ENCOUNTER — COMPLETE EYE EXAM (OUTPATIENT)
Dept: URBAN - METROPOLITAN AREA CLINIC 34 | Facility: CLINIC | Age: 78
End: 2023-02-22

## 2023-02-22 DIAGNOSIS — H25.813: ICD-10-CM

## 2023-02-22 DIAGNOSIS — H52.13: ICD-10-CM

## 2023-02-22 DIAGNOSIS — H52.4: ICD-10-CM

## 2023-02-22 DIAGNOSIS — E11.9: ICD-10-CM

## 2023-02-22 PROCEDURE — 92015 DETERMINE REFRACTIVE STATE: CPT

## 2023-02-22 PROCEDURE — 99214 OFFICE O/P EST MOD 30 MIN: CPT

## 2023-02-22 ASSESSMENT — VISUAL ACUITY
OU_SC: J1+
OD_CC: 20/20
OS_CC: 20/20-1

## 2023-02-22 ASSESSMENT — TONOMETRY
OS_IOP_MMHG: 16
OD_IOP_MMHG: 18

## 2023-03-16 ENCOUNTER — HOSPITAL ENCOUNTER (OUTPATIENT)
Dept: GENERAL RADIOLOGY | Age: 78
Discharge: HOME | End: 2023-03-16
Payer: MEDICARE

## 2023-03-16 ENCOUNTER — TRANSCRIBE ORDER (OUTPATIENT)
Dept: REGISTRATION | Age: 78
End: 2023-03-16

## 2023-03-16 DIAGNOSIS — M25.511 RIGHT SHOULDER PAIN: Primary | ICD-10-CM

## 2023-03-16 DIAGNOSIS — M25.511 RIGHT SHOULDER PAIN: ICD-10-CM

## 2023-03-16 PROCEDURE — 73030 X-RAY EXAM OF SHOULDER: CPT

## 2023-04-03 PROBLEM — E11.9 TYPE 2 DIABETES MELLITUS WITHOUT COMPLICATION, WITHOUT LONG-TERM CURRENT USE OF INSULIN (HCC): Status: RESOLVED | Noted: 2017-08-09 | Resolved: 2022-01-09

## 2023-04-28 ENCOUNTER — TRANSCRIBE ORDER (OUTPATIENT)
Dept: SCHEDULING | Age: 78
End: 2023-04-28

## 2023-05-08 ENCOUNTER — TELEPHONE (OUTPATIENT)
Age: 78
End: 2023-05-08

## 2023-05-08 NOTE — TELEPHONE ENCOUNTER
Attempted to call, no answer. Patient has pending message in [address], two calls made today. Patient should contact office if she still requests curbside visit.

## 2023-05-08 NOTE — TELEPHONE ENCOUNTER
Pt requesting appt for today, call back # [phone]    Re: ears, throat hurt/cold symptoms x 1 day

## 2023-05-09 ENCOUNTER — HOSPITAL ENCOUNTER (EMERGENCY)
Facility: HOSPITAL | Age: 78
Discharge: HOME | End: 2023-05-09
Attending: EMERGENCY MEDICINE
Payer: MEDICARE

## 2023-05-09 VITALS
TEMPERATURE: 98.1 F | HEART RATE: 78 BPM | RESPIRATION RATE: 18 BRPM | SYSTOLIC BLOOD PRESSURE: 141 MMHG | BODY MASS INDEX: 25.58 KG/M2 | DIASTOLIC BLOOD PRESSURE: 76 MMHG | WEIGHT: 163 LBS | OXYGEN SATURATION: 99 % | HEIGHT: 67 IN

## 2023-05-09 DIAGNOSIS — H65.02 ACUTE SEROUS OTITIS MEDIA OF LEFT EAR, RECURRENCE NOT SPECIFIED: Primary | ICD-10-CM

## 2023-05-09 LAB
FLUAV RNA SPEC QL NAA+PROBE: NOT DETECTED
FLUBV RNA SPEC QL NAA+PROBE: NOT DETECTED
SARS-COV-2 RNA RESP QL NAA+PROBE: NOT DETECTED

## 2023-05-09 PROCEDURE — 99283 EMERGENCY DEPT VISIT LOW MDM: CPT

## 2023-05-09 PROCEDURE — 87636 SARSCOV2 & INF A&B AMP PRB: CPT

## 2023-05-09 RX ORDER — AZITHROMYCIN 250 MG/1
250 TABLET, FILM COATED ORAL SEE ADMIN INSTRUCTIONS
Qty: 6 TABLET | Refills: 0 | Status: SHIPPED | OUTPATIENT
Start: 2023-05-09 | End: 2023-05-14

## 2023-05-09 ASSESSMENT — LIFESTYLE VARIABLES
HOW MANY STANDARD DRINKS CONTAINING ALCOHOL DO YOU HAVE ON A TYPICAL DAY: PATIENT DOES NOT DRINK
HOW OFTEN DO YOU HAVE A DRINK CONTAINING ALCOHOL: NEVER

## 2023-05-09 ASSESSMENT — ENCOUNTER SYMPTOMS
VOMITING: 0
COUGH: 0
DIARRHEA: 0
ABDOMINAL PAIN: 0
SHORTNESS OF BREATH: 0
SORE THROAT: 1
EYE REDNESS: 0
NAUSEA: 0

## 2023-05-09 ASSESSMENT — PAIN - FUNCTIONAL ASSESSMENT
PAIN_FUNCTIONAL_ASSESSMENT: 0-10
PAIN_FUNCTIONAL_ASSESSMENT: ACTIVITIES ARE NOT PREVENTED
PAIN_FUNCTIONAL_ASSESSMENT: 0-10

## 2023-05-09 ASSESSMENT — PAIN DESCRIPTION - ONSET: ONSET: ON-GOING

## 2023-05-09 ASSESSMENT — PAIN DESCRIPTION - PAIN TYPE: TYPE: ACUTE PAIN

## 2023-05-09 ASSESSMENT — PAIN DESCRIPTION - LOCATION
LOCATION: THROAT
LOCATION: THROAT;EAR

## 2023-05-09 ASSESSMENT — PAIN DESCRIPTION - DESCRIPTORS: DESCRIPTORS: ACHING

## 2023-05-09 ASSESSMENT — PAIN SCALES - GENERAL
PAINLEVEL_OUTOF10: 0
PAINLEVEL_OUTOF10: 7

## 2023-05-09 NOTE — ED TRIAGE NOTES
Pt arrived by  POV for cold symptoms since Friday. Pt reports she has had a headache, with cough, sore throat, and left ear ache. Pt reports no concerns for Covid or the Flu. Pt stated she took Nyquil last night which helped her sleep.   Pt is awake alert and oriented X 4, pt educated on ER flow

## 2023-05-09 NOTE — ED PROVIDER NOTES
%           Records Reviewed: Nursing notes reviewed    Provider Notes (Medical Decision Making):   DDX: 79-year-old female with left otitis media. Mild sore throat with hoarse voice. Tolerating p.o. without difficulty. Offered COVID testing and influenza testing here. She initially refused it since she had not been exposed to anyone that she knew of but then decided to have it done anyway. We will plan on discharge with Zithromax. Patient well-appearing, afebrile and tolerating p.o.  99% on room air. ED Course:   Initial assessment performed. The patients presenting problems have been discussed, and they are in agreement with the care plan formulated and outlined with them. I have encouraged them to ask questions as they arise throughout their visit. Progress note:    Pt noted to be feeling better and ready for discharge. Updated pt and/or family on all final lab and/or  imaging findings. Will follow up as instructed. All questions have been answered, pt voiced understanding and agreement with plan. Abx were prescribed, pt advised that diarrhea and rash are possible side effects of the medications. Specific return precautions provided as well as instructions to return to the ED should sx worsen at any time. Vital signs stable for discharge. I have also put together some discharge instructions for them that include: 1) educational information regarding their diagnosis, 2) how to care for their diagnosis at home, as well a 3) list of reasons why they would want to return to the ED prior to their follow-up appointment, should their condition change. Written by Abel Kerns MD        Critical Care Time:   0    Disposition:  Discharge    PLAN:  1.       Medication List        START taking these medications      azithromycin 250 MG tablet  Commonly known as: ZITHROMAX  Take 1 tablet by mouth See Admin Instructions for 5 days 500mg on day 1 followed by 250mg on days 2 - 5

## 2023-05-31 ENCOUNTER — TELEPHONE (OUTPATIENT)
Age: 78
End: 2023-05-31

## 2023-05-31 ASSESSMENT — SLEEP AND FATIGUE QUESTIONNAIRES
HOW LIKELY ARE YOU TO NOD OFF OR FALL ASLEEP WHILE WATCHING TV: 2
ESS TOTAL SCORE: 5
HOW LIKELY ARE YOU TO NOD OFF OR FALL ASLEEP WHILE SITTING AND TALKING TO SOMEONE: 0
HOW LIKELY ARE YOU TO NOD OFF OR FALL ASLEEP WHILE SITTING QUIETLY AFTER LUNCH WITHOUT ALCOHOL: 0
HOW LIKELY ARE YOU TO NOD OFF OR FALL ASLEEP WHEN YOU ARE A PASSENGER IN A CAR FOR AN HOUR WITHOUT A BREAK: 0
HOW LIKELY ARE YOU TO NOD OFF OR FALL ASLEEP IN A CAR, WHILE STOPPED FOR A FEW MINUTES IN TRAFFIC: 0
HOW LIKELY ARE YOU TO NOD OFF OR FALL ASLEEP WHILE SITTING INACTIVE IN A PUBLIC PLACE: 0
HOW LIKELY ARE YOU TO NOD OFF OR FALL ASLEEP WHILE SITTING AND READING: 0
HOW LIKELY ARE YOU TO NOD OFF OR FALL ASLEEP WHILE LYING DOWN TO REST IN THE AFTERNOON WHEN CIRCUMSTANCES PERMIT: 3

## 2023-05-31 NOTE — TELEPHONE ENCOUNTER
Called to confirm and triage for tomorrows visit at [facility].  answered (on hipaa) stating patient was on the other line but confirmed her appt for tomorrow. Asked she return our call to prepare for tomorrows appt (Florentino and Jaspal). He'll ask she return our call.

## 2023-06-01 ENCOUNTER — OFFICE VISIT (OUTPATIENT)
Age: 78
End: 2023-06-01
Payer: MEDICARE

## 2023-06-01 VITALS
DIASTOLIC BLOOD PRESSURE: 59 MMHG | SYSTOLIC BLOOD PRESSURE: 135 MMHG | WEIGHT: 170 LBS | HEART RATE: 67 BPM | BODY MASS INDEX: 26.68 KG/M2 | HEIGHT: 67 IN | OXYGEN SATURATION: 96 %

## 2023-06-01 DIAGNOSIS — G47.33 OBSTRUCTIVE SLEEP APNEA (ADULT) (PEDIATRIC): Primary | ICD-10-CM

## 2023-06-01 PROCEDURE — 1090F PRES/ABSN URINE INCON ASSESS: CPT | Performed by: NURSE PRACTITIONER

## 2023-06-01 PROCEDURE — 3078F DIAST BP <80 MM HG: CPT | Performed by: NURSE PRACTITIONER

## 2023-06-01 PROCEDURE — 99213 OFFICE O/P EST LOW 20 MIN: CPT | Performed by: NURSE PRACTITIONER

## 2023-06-01 PROCEDURE — 1036F TOBACCO NON-USER: CPT | Performed by: NURSE PRACTITIONER

## 2023-06-01 PROCEDURE — 1123F ACP DISCUSS/DSCN MKR DOCD: CPT | Performed by: NURSE PRACTITIONER

## 2023-06-01 PROCEDURE — G8427 DOCREV CUR MEDS BY ELIG CLIN: HCPCS | Performed by: NURSE PRACTITIONER

## 2023-06-01 PROCEDURE — 3075F SYST BP GE 130 - 139MM HG: CPT | Performed by: NURSE PRACTITIONER

## 2023-06-01 PROCEDURE — G8419 CALC BMI OUT NRM PARAM NOF/U: HCPCS | Performed by: NURSE PRACTITIONER

## 2023-06-01 PROCEDURE — G8399 PT W/DXA RESULTS DOCUMENT: HCPCS | Performed by: NURSE PRACTITIONER

## 2023-06-01 NOTE — PATIENT INSTRUCTIONS
[address] [place], [address]  Tel.  [phone]  Fax. [address] [facility] [address]  [place], [address] [facility]  Tel.  [phone]  Fax. [phone] [address]han Burgess  Tel.  [phone]  Fax. [phone]     Learning About CPAP for Sleep Apnea  What is CPAP? CPAP is a small machine that you use at home every night while you sleep. It increases air pressure in your throat to keep your airway open. When you have sleep apnea, this can help you sleep better so you feel much better. CPAP stands for \"continuous positive airway pressure. \"  The CPAP machine will have one of the following:  A mask that covers your nose and mouth  Prongs that fit into your nose  A mask that covers your nose only, the most common type. This type is called NCPAP. The N stands for \"nasal.\"  Why is it done? CPAP is usually the best treatment for obstructive sleep apnea. It is the first treatment choice and the most widely used. Your doctor may suggest CPAP if you have: Moderate to severe sleep apnea. Sleep apnea and coronary artery disease (CAD) or heart failure. How does it help? CPAP can help you have more normal sleep, so you feel less sleepy and more alert during the daytime. CPAP may help keep heart failure or other heart problems from getting worse. NCPAP may help lower your blood pressure. If you use CPAP, your bed partner may also sleep better because you are not snoring or restless. What are the side effects? Some people who use CPAP have:  A dry or stuffy nose and a sore throat. Irritated skin on the face. Sore eyes. Bloating. If you have any of these problems, work with your doctor to fix them. Here are some things you can try:  Be sure the mask or nasal prongs fit well. See if your doctor can adjust the pressure of your CPAP. If your nose is dry, try a humidifier.   If your nose is runny or stuffy, try decongestant medicine or a steroid

## 2023-06-01 NOTE — PROGRESS NOTES
Chamber for Humidifier (Replace) 1 every 6 months. Kamlesh Faria AGPCNP-BC; NPI: [id number]    Electronically signed. Date:- 06/01/23       * Counseling was provided regarding the importance of regular PAP use with emphasis on ensuring sufficient total sleep time, proper sleep hygiene, and safe driving. * Re-enforced proper and regular cleaning for the device. * She was asked to contact our office for any problems regarding PAP therapy. 2. Encouraged continued weight management program through appropriate diet and exercise regimen as significant weight reduction has been shown to reduce severity of obstructive sleep apnea. She has lost 20 pounds since prior visit, she has been walking. SUBJECTIVE/OBJECTIVE:    She  is seen today for follow up on PAP device and reports no problems using the device. The following concerns identified:    Drowsiness no Problems exhaling no   Snoring no Forget to put on no   Mask Comfortable yes Can't fall asleep no   Dry Mouth sometimes Mask falls off no   Air Leaking no Frequent awakenings no       She reports that her sleep has improved on PAP therapy using nasal mask and heated tubing. She would tiffanie to trial full face mask to help with dry mouth. We reviewed recent device data as well as data from April that showed some nights with elevated AHI. Review of detail device data shows some nights with cluster of events in early morning hours, likely REM/position related. She will trial using pillow to position with slight elevation. Auto pressure: 10-16 cmH2O;Peak Avg Pressure: 13.7 cmH2O;  Avg. Device Pressure <= 90 %: 11.8 cmH2O    Average % Night in Large Leak:  No intake/output data recorded. CMS Compliance % Used Days >= 4 hours: 83. Average daily use of 8:14 hours. Therapy Apnea Index averaged over PAP use: 5.2 /hr which reflects improved sleep breathing condition.     [place] Sleepiness Score: 5 and Modified F.O.S.Q. Score Total / 2: 19.5

## 2023-06-02 ENCOUNTER — CLINICAL DOCUMENTATION (OUTPATIENT)
Age: 78
End: 2023-06-02

## 2023-06-05 ENCOUNTER — CLINICAL DOCUMENTATION (OUTPATIENT)
Age: 78
End: 2023-06-05

## 2023-08-11 ENCOUNTER — OFFICE VISIT (OUTPATIENT)
Age: 78
End: 2023-08-11
Payer: MEDICARE

## 2023-08-11 VITALS
HEART RATE: 68 BPM | SYSTOLIC BLOOD PRESSURE: 130 MMHG | WEIGHT: 170.8 LBS | RESPIRATION RATE: 17 BRPM | BODY MASS INDEX: 26.81 KG/M2 | OXYGEN SATURATION: 96 % | DIASTOLIC BLOOD PRESSURE: 70 MMHG | HEIGHT: 67 IN

## 2023-08-11 DIAGNOSIS — E11.21 TYPE 2 DIABETES MELLITUS WITH DIABETIC NEPHROPATHY, WITHOUT LONG-TERM CURRENT USE OF INSULIN (HCC): Primary | ICD-10-CM

## 2023-08-11 DIAGNOSIS — E03.9 HYPOTHYROIDISM, UNSPECIFIED TYPE: ICD-10-CM

## 2023-08-11 DIAGNOSIS — N18.31 CHRONIC KIDNEY DISEASE, STAGE 3A (HCC): ICD-10-CM

## 2023-08-11 DIAGNOSIS — I35.0 NONRHEUMATIC AORTIC (VALVE) STENOSIS: ICD-10-CM

## 2023-08-11 DIAGNOSIS — I48.20 CHRONIC ATRIAL FIBRILLATION, UNSPECIFIED (HCC): ICD-10-CM

## 2023-08-11 DIAGNOSIS — E78.2 MIXED HYPERLIPIDEMIA: ICD-10-CM

## 2023-08-11 PROCEDURE — 1123F ACP DISCUSS/DSCN MKR DOCD: CPT | Performed by: INTERNAL MEDICINE

## 2023-08-11 PROCEDURE — 3075F SYST BP GE 130 - 139MM HG: CPT | Performed by: INTERNAL MEDICINE

## 2023-08-11 PROCEDURE — 99214 OFFICE O/P EST MOD 30 MIN: CPT | Performed by: INTERNAL MEDICINE

## 2023-08-11 PROCEDURE — 1036F TOBACCO NON-USER: CPT | Performed by: INTERNAL MEDICINE

## 2023-08-11 PROCEDURE — G8399 PT W/DXA RESULTS DOCUMENT: HCPCS | Performed by: INTERNAL MEDICINE

## 2023-08-11 PROCEDURE — G8419 CALC BMI OUT NRM PARAM NOF/U: HCPCS | Performed by: INTERNAL MEDICINE

## 2023-08-11 PROCEDURE — 3044F HG A1C LEVEL LT 7.0%: CPT | Performed by: INTERNAL MEDICINE

## 2023-08-11 PROCEDURE — 1090F PRES/ABSN URINE INCON ASSESS: CPT | Performed by: INTERNAL MEDICINE

## 2023-08-11 PROCEDURE — 3078F DIAST BP <80 MM HG: CPT | Performed by: INTERNAL MEDICINE

## 2023-08-11 PROCEDURE — G8427 DOCREV CUR MEDS BY ELIG CLIN: HCPCS | Performed by: INTERNAL MEDICINE

## 2023-08-11 SDOH — ECONOMIC STABILITY: HOUSING INSECURITY
IN THE LAST 12 MONTHS, WAS THERE A TIME WHEN YOU DID NOT HAVE A STEADY PLACE TO SLEEP OR SLEPT IN A SHELTER (INCLUDING NOW)?: NO

## 2023-08-11 SDOH — ECONOMIC STABILITY: FOOD INSECURITY: WITHIN THE PAST 12 MONTHS, YOU WORRIED THAT YOUR FOOD WOULD RUN OUT BEFORE YOU GOT MONEY TO BUY MORE.: NEVER TRUE

## 2023-08-11 SDOH — ECONOMIC STABILITY: INCOME INSECURITY: HOW HARD IS IT FOR YOU TO PAY FOR THE VERY BASICS LIKE FOOD, HOUSING, MEDICAL CARE, AND HEATING?: NOT HARD AT ALL

## 2023-08-11 SDOH — ECONOMIC STABILITY: FOOD INSECURITY: WITHIN THE PAST 12 MONTHS, THE FOOD YOU BOUGHT JUST DIDN'T LAST AND YOU DIDN'T HAVE MONEY TO GET MORE.: NEVER TRUE

## 2023-08-11 ASSESSMENT — PATIENT HEALTH QUESTIONNAIRE - PHQ9
SUM OF ALL RESPONSES TO PHQ QUESTIONS 1-9: 0
2. FEELING DOWN, DEPRESSED OR HOPELESS: 0
SUM OF ALL RESPONSES TO PHQ QUESTIONS 1-9: 0
SUM OF ALL RESPONSES TO PHQ9 QUESTIONS 1 & 2: 0
1. LITTLE INTEREST OR PLEASURE IN DOING THINGS: 0
SUM OF ALL RESPONSES TO PHQ QUESTIONS 1-9: 0
SUM OF ALL RESPONSES TO PHQ QUESTIONS 1-9: 0

## 2023-08-11 NOTE — PROGRESS NOTES
Wt Readings from Last 3 Encounters:   08/11/23 170 lb 12.8 oz (77.5 kg)   06/01/23 170 lb (77.1 kg)   05/09/23 163 lb (73.9 kg)         Duration-30 minutes primarily counseling and education in addition to reviewing prior notes and laboratory tests as well as relevant imaging results. Assessment and Plan:    1. Type 2 diabetes mellitus with diabetic nephropathy, without long-term current use of insulin (HCC)  - Hemoglobin A1C; Future    2. Chronic atrial fibrillation, unspecified ([address])  She is chronically anticoagulated and remains under the care of Dr. Alejandrina Agarwal. Continue Eliquis 5 mg twice a day    3. Chronic kidney disease, stage 3a ([address])  Due for assessment  - CBC with Auto Differential; Future  - Comprehensive Metabolic Panel; Future    4. Hypothyroidism, unspecified type   TSH; Future    5. Nonrheumatic aortic (valve) stenosis  Due for assessment    6. Mixed hyperlipidemia  - Lipid Panel; Future         Ms. Mouna Valle is a 66 y.o. female who is here for evaluation of No chief complaint on file. .             Orders Placed This Encounter   Procedures    CBC with Auto Differential     Standing Status:   Future     Standing Expiration Date:   8/11/2024    Comprehensive Metabolic Panel     Standing Status:   Future     Standing Expiration Date:   8/11/2024    Lipid Panel     Standing Status:   Future     Standing Expiration Date:   8/11/2024    TSH     Standing Status:   Future     Standing Expiration Date:   8/11/2024    Hemoglobin A1C     Standing Status:   Future     Standing Expiration Date:   8/11/2024       No follow-up provider specified.     Current Outpatient Medications   Medication Sig Dispense Refill    amLODIPine (NORVASC) 2.5 MG tablet TAKE 1 TABLET DAILY ALONG  WITH 5MG FOR TOTAL OF 7.5MGDAILY      amLODIPine (NORVASC) 5 MG tablet Take 1 tablet by mouth daily      apixaban (ELIQUIS) 5 MG TABS tablet Take 1 tablet by mouth 2 times daily      levothyroxine (SYNTHROID) 112 MCG tablet Take 1 tablet by

## 2023-08-12 LAB
ALBUMIN SERPL-MCNC: 4.6 G/DL (ref 3.5–5)
ALBUMIN/GLOB SERPL: 1.4 (ref 1.1–2.2)
ALP SERPL-CCNC: 89 U/L (ref 45–117)
ALT SERPL-CCNC: 20 U/L (ref 12–78)
ANION GAP SERPL CALC-SCNC: 7 MMOL/L (ref 5–15)
AST SERPL-CCNC: 19 U/L (ref 15–37)
BASOPHILS # BLD: 0.1 K/UL (ref 0–0.1)
BASOPHILS NFR BLD: 1 % (ref 0–1)
BILIRUB SERPL-MCNC: 0.4 MG/DL (ref 0.2–1)
BUN SERPL-MCNC: 25 MG/DL (ref 6–20)
BUN/CREAT SERPL: 23 (ref 12–20)
CALCIUM SERPL-MCNC: 9.9 MG/DL (ref 8.5–10.1)
CHLORIDE SERPL-SCNC: 102 MMOL/L (ref 97–108)
CHOLEST SERPL-MCNC: 195 MG/DL
CO2 SERPL-SCNC: 28 MMOL/L (ref 21–32)
CREAT SERPL-MCNC: 1.07 MG/DL (ref 0.55–1.02)
DIFFERENTIAL METHOD BLD: NORMAL
EOSINOPHIL # BLD: 0.2 K/UL (ref 0–0.4)
EOSINOPHIL NFR BLD: 3 % (ref 0–7)
ERYTHROCYTE [DISTWIDTH] IN BLOOD BY AUTOMATED COUNT: 12.5 % (ref 11.5–14.5)
EST. AVERAGE GLUCOSE BLD GHB EST-MCNC: 140 MG/DL
GLOBULIN SER CALC-MCNC: 3.3 G/DL (ref 2–4)
GLUCOSE SERPL-MCNC: 120 MG/DL (ref 65–100)
HBA1C MFR BLD: 6.5 % (ref 4–5.6)
HCT VFR BLD AUTO: 39.7 % (ref 35–47)
HDLC SERPL-MCNC: 70 MG/DL
HDLC SERPL: 2.8 (ref 0–5)
HGB BLD-MCNC: 12.6 G/DL (ref 11.5–16)
IMM GRANULOCYTES # BLD AUTO: 0 K/UL (ref 0–0.04)
IMM GRANULOCYTES NFR BLD AUTO: 0 % (ref 0–0.5)
LDLC SERPL CALC-MCNC: 98.6 MG/DL (ref 0–100)
LYMPHOCYTES # BLD: 2.6 K/UL (ref 0.8–3.5)
LYMPHOCYTES NFR BLD: 35 % (ref 12–49)
MCH RBC QN AUTO: 30.1 PG (ref 26–34)
MCHC RBC AUTO-ENTMCNC: 31.7 G/DL (ref 30–36.5)
MCV RBC AUTO: 94.7 FL (ref 80–99)
MONOCYTES # BLD: 0.7 K/UL (ref 0–1)
MONOCYTES NFR BLD: 9 % (ref 5–13)
NEUTS SEG # BLD: 3.9 K/UL (ref 1.8–8)
NEUTS SEG NFR BLD: 52 % (ref 32–75)
NRBC # BLD: 0 K/UL (ref 0–0.01)
NRBC BLD-RTO: 0 PER 100 WBC
PLATELET # BLD AUTO: 282 K/UL (ref 150–400)
PMV BLD AUTO: 10.6 FL (ref 8.9–12.9)
POTASSIUM SERPL-SCNC: 4.1 MMOL/L (ref 3.5–5.1)
PROT SERPL-MCNC: 7.9 G/DL (ref 6.4–8.2)
RBC # BLD AUTO: 4.19 M/UL (ref 3.8–5.2)
SODIUM SERPL-SCNC: 137 MMOL/L (ref 136–145)
TRIGL SERPL-MCNC: 132 MG/DL
TSH SERPL DL<=0.05 MIU/L-ACNC: 0.27 UIU/ML (ref 0.36–3.74)
VLDLC SERPL CALC-MCNC: 26.4 MG/DL
WBC # BLD AUTO: 7.4 K/UL (ref 3.6–11)

## 2023-12-04 RX ORDER — ROSUVASTATIN CALCIUM 10 MG/1
10 TABLET, COATED ORAL NIGHTLY
Qty: 90 TABLET | Refills: 4 | Status: SHIPPED | OUTPATIENT
Start: 2023-12-04

## 2023-12-08 ENCOUNTER — NURSE ONLY (OUTPATIENT)
Age: 78
End: 2023-12-08

## 2023-12-08 DIAGNOSIS — Z23 NEED FOR INFLUENZA VACCINATION: Primary | ICD-10-CM

## 2023-12-08 ASSESSMENT — PATIENT HEALTH QUESTIONNAIRE - PHQ9
2. FEELING DOWN, DEPRESSED OR HOPELESS: 0
SUM OF ALL RESPONSES TO PHQ QUESTIONS 1-9: 0
SUM OF ALL RESPONSES TO PHQ9 QUESTIONS 1 & 2: 0
1. LITTLE INTEREST OR PLEASURE IN DOING THINGS: 0
SUM OF ALL RESPONSES TO PHQ QUESTIONS 1-9: 0

## 2023-12-08 NOTE — PROGRESS NOTES
After obtaining consent, and per orders of Dr. Maria Esther Alcantar, injection of Influenza,Fluad given in Left deltoid by Valdez Mccurdy MA. Patient instructed to remain in clinic for 20 minutes afterwards, and to report any adverse reaction to me immediately.

## 2023-12-12 RX ORDER — ROSUVASTATIN CALCIUM 10 MG/1
10 TABLET, COATED ORAL NIGHTLY
Qty: 90 TABLET | Refills: 4 | Status: SHIPPED | OUTPATIENT
Start: 2023-12-12

## 2024-02-11 NOTE — PROGRESS NOTES
Medicare Annual Wellness Visit    Rin Patterson is here for Medicare AWV, Diabetes (Reports sugars have been \"good\"), Other (Currently wearing holter monitor. Some rashing noted under dressing. ), and Hypertension    Assessment & Plan   Medicare annual wellness visit, subsequent  Type 2 diabetes mellitus with diabetic nephropathy, without long-term current use of insulin (HCC)  -     Hemoglobin A1C; Future  Chronic atrial fibrillation, unspecified (HCC)  -     Comprehensive Metabolic Panel; Future  Chronic kidney disease, stage 3a (HCC)  Hypothyroidism, unspecified type  -     TSH; Future  Mixed hyperlipidemia  -     CBC with Auto Differential; Future  -     Comprehensive Metabolic Panel; Future  -     Lipid Panel; Future    Recommendations for Preventive Services Due: see orders and patient instructions/AVS.  Recommended screening schedule for the next 5-10 years is provided to the patient in written form: see Patient Instructions/AVS.     No follow-ups on file.     Subjective     Patient's complete Health Risk Assessment and screening values have been reviewed and are found in Flowsheets. The following problems were reviewed today and where indicated follow up appointments were made and/or referrals ordered.    Positive Risk Factor Screenings with Interventions:    Fall Risk:  Do you feel unsteady or are you worried about falling? : no  2 or more falls in past year?: no  Fall with injury in past year?: (!) yes       Interventions:    Reviewed medications, home hazards, visual acuity, and co-morbidities that can increase risk for falls            General HRA Questions:  Select all that apply: (!) New or Increased Pain, New or Increased Fatigue, Stress    Pain Interventions:  Patient advised to follow up in the office for further evaluation and treatment    Fatigue Interventions:  Patient advised to follow up in the office for further evaluation and treatment    Stress Interventions:  Patient advised to follow up in

## 2024-02-12 ENCOUNTER — OFFICE VISIT (OUTPATIENT)
Age: 79
End: 2024-02-12
Payer: MEDICARE

## 2024-02-12 VITALS
HEART RATE: 70 BPM | BODY MASS INDEX: 26.09 KG/M2 | HEIGHT: 67 IN | SYSTOLIC BLOOD PRESSURE: 134 MMHG | OXYGEN SATURATION: 97 % | RESPIRATION RATE: 17 BRPM | DIASTOLIC BLOOD PRESSURE: 62 MMHG | WEIGHT: 166.2 LBS

## 2024-02-12 DIAGNOSIS — E11.21 TYPE 2 DIABETES MELLITUS WITH DIABETIC NEPHROPATHY, WITHOUT LONG-TERM CURRENT USE OF INSULIN (HCC): ICD-10-CM

## 2024-02-12 DIAGNOSIS — N18.31 CHRONIC KIDNEY DISEASE, STAGE 3A (HCC): ICD-10-CM

## 2024-02-12 DIAGNOSIS — I48.20 CHRONIC ATRIAL FIBRILLATION, UNSPECIFIED (HCC): ICD-10-CM

## 2024-02-12 DIAGNOSIS — E03.9 HYPOTHYROIDISM, UNSPECIFIED TYPE: ICD-10-CM

## 2024-02-12 DIAGNOSIS — E78.2 MIXED HYPERLIPIDEMIA: ICD-10-CM

## 2024-02-12 DIAGNOSIS — Z00.00 MEDICARE ANNUAL WELLNESS VISIT, SUBSEQUENT: Primary | ICD-10-CM

## 2024-02-12 PROCEDURE — G8399 PT W/DXA RESULTS DOCUMENT: HCPCS | Performed by: INTERNAL MEDICINE

## 2024-02-12 PROCEDURE — 3078F DIAST BP <80 MM HG: CPT | Performed by: INTERNAL MEDICINE

## 2024-02-12 PROCEDURE — G0439 PPPS, SUBSEQ VISIT: HCPCS | Performed by: INTERNAL MEDICINE

## 2024-02-12 PROCEDURE — 1090F PRES/ABSN URINE INCON ASSESS: CPT | Performed by: INTERNAL MEDICINE

## 2024-02-12 PROCEDURE — 99213 OFFICE O/P EST LOW 20 MIN: CPT | Performed by: INTERNAL MEDICINE

## 2024-02-12 PROCEDURE — G8484 FLU IMMUNIZE NO ADMIN: HCPCS | Performed by: INTERNAL MEDICINE

## 2024-02-12 PROCEDURE — G8427 DOCREV CUR MEDS BY ELIG CLIN: HCPCS | Performed by: INTERNAL MEDICINE

## 2024-02-12 PROCEDURE — 1036F TOBACCO NON-USER: CPT | Performed by: INTERNAL MEDICINE

## 2024-02-12 PROCEDURE — 1123F ACP DISCUSS/DSCN MKR DOCD: CPT | Performed by: INTERNAL MEDICINE

## 2024-02-12 PROCEDURE — 3075F SYST BP GE 130 - 139MM HG: CPT | Performed by: INTERNAL MEDICINE

## 2024-02-12 PROCEDURE — G8419 CALC BMI OUT NRM PARAM NOF/U: HCPCS | Performed by: INTERNAL MEDICINE

## 2024-02-12 NOTE — PROGRESS NOTES
Addendum:   Lab Results   Component Value Date    TSH 0.20 (L) 02/06/2023    [id number] 0.16 (L) 02/12/2024     TSH is suppressed.  Decrease Synthroid to 100 mcg daily. Sent to [facility]      1. Medicare annual wellness visit, subsequent  2. Type 2 diabetes mellitus with diabetic nephropathy, without long-term current use of insulin (HCC)  - Hemoglobin A1C; Future  - Hemoglobin A1C    3. Chronic atrial fibrillation, unspecified (HCC)  - Comprehensive Metabolic Panel; Future  - Comprehensive Metabolic Panel    4. Chronic kidney disease, stage 3a (HCC)  5. Hypothyroidism, unspecified type  - TSH; Future  - TSH    6. Mixed hyperlipidemia  - CBC with Auto Differential; Future  - Comprehensive Metabolic Panel; Future  - Lipid Panel; Future  - Lipid Panel  - Comprehensive Metabolic Panel  - CBC with Auto Differential         Chief Complaint   Patient presents with    Medicare AWV    Diabetes     Reports sugars have been \"good\"    Other     Currently wearing holter monitor. Some rashing noted under dressing.     Hypertension         Orders Placed This Encounter   Procedures    CBC with Auto Differential     Standing Status:   Future     Number of Occurrences:   1     Standing Expiration Date:   2/12/2025    Comprehensive Metabolic Panel     Standing Status:   Future     Number of Occurrences:   1     Standing Expiration Date:   2/12/2025    Hemoglobin A1C     Standing Status:   Future     Number of Occurrences:   1     Standing Expiration Date:   2/12/2025    Lipid Panel     Standing Status:   Future     Number of Occurrences:   1     Standing Expiration Date:   2/12/2025    TSH     Standing Status:   Future     Number of Occurrences:   1     Standing Expiration Date:   2/12/2025       James Lr MD, FACP      HPI:         is a 79 y.o. female who arrives for subsequent annual wellness visit and for interval assessment of several concerns including hypertension, hyperlipidemia and hypothyroidism.    She had a fairly

## 2024-02-12 NOTE — PROGRESS NOTES
Rin Patterson is a 79 y.o. female presenting for/with:    Chief Complaint   Patient presents with    Medicare AWV    Diabetes     Reports sugars have been \"good\"    Other     Currently wearing holter monitor. Some rashing noted under dressing.     Hypertension       Vitals:    02/12/24 0931 02/12/24 0939   BP: (!) 144/62 134/62   Site: Left Upper Arm Left Upper Arm   Position: Sitting Sitting   Cuff Size: Medium Adult Medium Adult   Pulse: 70    Resp: 17    SpO2: 97%    Weight: 75.4 kg (166 lb 3.2 oz)    Height: 1.702 m (5' 7\")        Pain Scale: 0 - No pain/10  Pain Location:     \"Have you been to the ER, urgent care clinic since your last visit?  Hospitalized since your last visit?\"    NO    “Have you seen or consulted any other health care providers outside of [facility] since your last visit?”    NO                 2/12/2024     9:39 AM   PHQ-9    Little interest or pleasure in doing things 0   Feeling down, depressed, or hopeless 0   PHQ-2 Score 0   PHQ-9 Total Score 0           2/9/2023    12:00 AM 3/15/2022    12:00 AM 1/14/2022    12:00 AM   [facility] AMB LEARNING ASSESSMENT   Primary Learner Patient Patient Patient   Primary Language ENGLISH ENGLISH ENGLISH   Learning Preference LISTENING READING READING   Answered By self patient patient   Relationship to Learner SELF SELF SELF            2/12/2024     9:39 AM   Amb Fall Risk Assessment and TUG Test   Do you feel unsteady or are you worried about falling?  no   2 or more falls in past year? no   Fall with injury in past year? yes           8/11/2023     8:00 AM   ADL ASSESSMENT   Feeding yourself No Help Needed   Getting from bed to chair No Help Needed   Getting dressed No Help Needed   Bathing or showering No Help Needed   Walk across the room (includes cane/walker) No Help Needed   Using the telphone No Help Needed   Taking your medications No Help Needed   Preparing meals No Help Needed   Managing money (expenses/bills) No Help Needed

## 2024-02-12 NOTE — PATIENT INSTRUCTIONS
Avoid discussing your feelings with people who make you feel worse.  Write. It may help to write about things that are bothering you. This helps you find out how much stress you feel and what is causing it. When you know this, you can find better ways to cope.  What can you do to prevent stress?  You might try some of these things to help prevent stress:  Manage your time. This helps you find time to do the things you want and need to do.  Get enough sleep. Your body recovers from the stresses of the day while you are sleeping.  Get support. Your family, friends, and community can make a difference in how you experience stress.  Limit your news feed. Avoid or limit time on social media or news that may make you feel stressed.  Do something active. Exercise or activity can help reduce stress. Walking is a great way to get started.  Where can you learn more?  Go to https://www.[employer].net/patientEd and enter N032 to learn more about \"Learning About Stress.\"  Current as of: February 26, 2023               Content Version: 13.9  © 5578-5970 [employer].   Care instructions adapted under license by [email or web address]. If you have questions about a medical condition or this instruction, always ask your healthcare professional. [employer] disclaims any warranty or liability for your use of this information.           Learning About Vision Tests  What are vision tests?     The four most common vision tests are visual acuity tests, refraction, visual field tests, and color vision tests.  Visual acuity (sharpness) tests  These tests are used:  To see if you need glasses or contact lenses.  To monitor an eye problem.  To check an eye injury.  Visual acuity tests are done as part of routine exams. You may also have this test when you get your 's license or apply for some types of jobs.  Visual field tests  These tests are used:  To check for vision loss in any area of your range of vision.  To

## 2024-02-13 LAB
ALBUMIN SERPL-MCNC: 4.5 G/DL (ref 3.5–5)
ALBUMIN/GLOB SERPL: 1.4 (ref 1.1–2.2)
ALP SERPL-CCNC: 122 U/L (ref 45–117)
ALT SERPL-CCNC: 20 U/L (ref 12–78)
ANION GAP SERPL CALC-SCNC: 6 MMOL/L (ref 5–15)
AST SERPL-CCNC: 19 U/L (ref 15–37)
BASOPHILS # BLD: 0.1 K/UL (ref 0–0.1)
BASOPHILS NFR BLD: 1 % (ref 0–1)
BILIRUB SERPL-MCNC: 0.4 MG/DL (ref 0.2–1)
BUN SERPL-MCNC: 31 MG/DL (ref 6–20)
BUN/CREAT SERPL: 29 (ref 12–20)
CALCIUM SERPL-MCNC: 10 MG/DL (ref 8.5–10.1)
CHLORIDE SERPL-SCNC: 101 MMOL/L (ref 97–108)
CHOLEST SERPL-MCNC: 223 MG/DL
CO2 SERPL-SCNC: 31 MMOL/L (ref 21–32)
CREAT SERPL-MCNC: 1.08 MG/DL (ref 0.55–1.02)
DIFFERENTIAL METHOD BLD: NORMAL
EOSINOPHIL # BLD: 0.2 K/UL (ref 0–0.4)
EOSINOPHIL NFR BLD: 3 % (ref 0–7)
ERYTHROCYTE [DISTWIDTH] IN BLOOD BY AUTOMATED COUNT: 12 % (ref 11.5–14.5)
EST. AVERAGE GLUCOSE BLD GHB EST-MCNC: 140 MG/DL
GLOBULIN SER CALC-MCNC: 3.3 G/DL (ref 2–4)
GLUCOSE SERPL-MCNC: 136 MG/DL (ref 65–100)
HBA1C MFR BLD: 6.5 % (ref 4–5.6)
HCT VFR BLD AUTO: 39.5 % (ref 35–47)
HDLC SERPL-MCNC: 71 MG/DL
HDLC SERPL: 3.1 (ref 0–5)
HGB BLD-MCNC: 13.3 G/DL (ref 11.5–16)
IMM GRANULOCYTES # BLD AUTO: 0 K/UL (ref 0–0.04)
IMM GRANULOCYTES NFR BLD AUTO: 0 % (ref 0–0.5)
LDLC SERPL CALC-MCNC: 128.2 MG/DL (ref 0–100)
LYMPHOCYTES # BLD: 2.9 K/UL (ref 0.8–3.5)
LYMPHOCYTES NFR BLD: 37 % (ref 12–49)
MCH RBC QN AUTO: 30.8 PG (ref 26–34)
MCHC RBC AUTO-ENTMCNC: 33.7 G/DL (ref 30–36.5)
MCV RBC AUTO: 91.4 FL (ref 80–99)
MONOCYTES # BLD: 0.7 K/UL (ref 0–1)
MONOCYTES NFR BLD: 9 % (ref 5–13)
NEUTS SEG # BLD: 4 K/UL (ref 1.8–8)
NEUTS SEG NFR BLD: 50 % (ref 32–75)
NRBC # BLD: 0 K/UL (ref 0–0.01)
NRBC BLD-RTO: 0 PER 100 WBC
PLATELET # BLD AUTO: 288 K/UL (ref 150–400)
PMV BLD AUTO: 10.6 FL (ref 8.9–12.9)
POTASSIUM SERPL-SCNC: 4.1 MMOL/L (ref 3.5–5.1)
PROT SERPL-MCNC: 7.8 G/DL (ref 6.4–8.2)
RBC # BLD AUTO: 4.32 M/UL (ref 3.8–5.2)
SODIUM SERPL-SCNC: 138 MMOL/L (ref 136–145)
TRIGL SERPL-MCNC: 119 MG/DL
TSH SERPL DL<=0.05 MIU/L-ACNC: 0.16 UIU/ML (ref 0.36–3.74)
VLDLC SERPL CALC-MCNC: 23.8 MG/DL
WBC # BLD AUTO: 7.9 K/UL (ref 3.6–11)

## 2024-02-14 RX ORDER — LEVOTHYROXINE SODIUM 0.1 MG/1
100 TABLET ORAL DAILY
Qty: 90 TABLET | Refills: 4 | Status: SHIPPED | OUTPATIENT
Start: 2024-02-14

## 2024-02-16 RX ORDER — AMLODIPINE BESYLATE 2.5 MG/1
2.5 TABLET ORAL DAILY
Qty: 90 TABLET | Refills: 4 | Status: SHIPPED | OUTPATIENT
Start: 2024-02-16

## 2024-02-16 RX ORDER — ROSUVASTATIN CALCIUM 10 MG/1
10 TABLET, COATED ORAL NIGHTLY
Qty: 90 TABLET | Refills: 4 | Status: SHIPPED | OUTPATIENT
Start: 2024-02-16

## 2024-02-16 RX ORDER — AMLODIPINE BESYLATE 5 MG/1
5 TABLET ORAL DAILY
Qty: 90 TABLET | Refills: 4 | Status: SHIPPED | OUTPATIENT
Start: 2024-02-16

## 2024-02-16 RX ORDER — LOSARTAN POTASSIUM AND HYDROCHLOROTHIAZIDE 25; 100 MG/1; MG/1
1 TABLET ORAL NIGHTLY
Qty: 90 TABLET | Refills: 4 | Status: SHIPPED | OUTPATIENT
Start: 2024-02-16

## 2024-02-16 NOTE — TELEPHONE ENCOUNTER
Please send these scripts as follows to [facility] for refills, Amlodipine, Rosuvastatin, and Losartan

## 2024-04-03 ENCOUNTER — ESTABLISHED COMPREHENSIVE EXAM (OUTPATIENT)
Dept: URBAN - METROPOLITAN AREA CLINIC 34 | Facility: CLINIC | Age: 79
End: 2024-04-03

## 2024-04-03 DIAGNOSIS — E11.9: ICD-10-CM

## 2024-04-03 DIAGNOSIS — H52.13: ICD-10-CM

## 2024-04-03 DIAGNOSIS — H25.813: ICD-10-CM

## 2024-04-03 PROCEDURE — 92015 DETERMINE REFRACTIVE STATE: CPT | Mod: NC

## 2024-04-03 PROCEDURE — 99214 OFFICE O/P EST MOD 30 MIN: CPT

## 2024-04-03 ASSESSMENT — TONOMETRY
OD_IOP_MMHG: 16
OS_IOP_MMHG: 16

## 2024-04-03 ASSESSMENT — VISUAL ACUITY
OS_SC: J1
OS_CC: 20/30+1
OD_SC: J1
OD_CC: 20/25

## 2024-04-22 DIAGNOSIS — F41.9 ANXIETY: ICD-10-CM

## 2024-04-22 DIAGNOSIS — E03.4 HYPOTHYROIDISM DUE TO ACQUIRED ATROPHY OF THYROID: ICD-10-CM

## 2024-04-22 DIAGNOSIS — E11.21 TYPE 2 DIABETES WITH NEPHROPATHY (HCC): ICD-10-CM

## 2024-04-22 DIAGNOSIS — I10 ESSENTIAL HYPERTENSION: Primary | ICD-10-CM

## 2024-04-22 DIAGNOSIS — I48.20 CHRONIC ATRIAL FIBRILLATION (HCC): ICD-10-CM

## 2024-04-22 RX ORDER — AMLODIPINE BESYLATE 2.5 MG/1
2.5 TABLET ORAL DAILY
Qty: 90 TABLET | Refills: 4 | Status: SHIPPED | OUTPATIENT
Start: 2024-04-22

## 2024-04-22 RX ORDER — ROSUVASTATIN CALCIUM 10 MG/1
10 TABLET, COATED ORAL NIGHTLY
Qty: 90 TABLET | Refills: 4 | Status: SHIPPED | OUTPATIENT
Start: 2024-04-22

## 2024-04-22 RX ORDER — LEVOTHYROXINE SODIUM 0.1 MG/1
100 TABLET ORAL DAILY
Qty: 90 TABLET | Refills: 4 | Status: SHIPPED | OUTPATIENT
Start: 2024-04-22

## 2024-04-22 RX ORDER — AMLODIPINE BESYLATE 5 MG/1
5 TABLET ORAL DAILY
Qty: 90 TABLET | Refills: 4 | Status: SHIPPED | OUTPATIENT
Start: 2024-04-22

## 2024-04-22 RX ORDER — METOPROLOL SUCCINATE 100 MG/1
TABLET, EXTENDED RELEASE ORAL
Qty: 90 TABLET | Refills: 3 | Status: SHIPPED | OUTPATIENT
Start: 2024-04-22

## 2024-04-22 RX ORDER — LOSARTAN POTASSIUM AND HYDROCHLOROTHIAZIDE 25; 100 MG/1; MG/1
1 TABLET ORAL NIGHTLY
Qty: 90 TABLET | Refills: 4 | Status: SHIPPED | OUTPATIENT
Start: 2024-04-22

## 2024-05-25 ENCOUNTER — HOSPITAL ENCOUNTER (EMERGENCY)
Facility: HOSPITAL | Age: 79
Discharge: HOME | End: 2024-05-25
Attending: EMERGENCY MEDICINE
Payer: MEDICARE

## 2024-05-25 ENCOUNTER — APPOINTMENT (OUTPATIENT)
Facility: HOSPITAL | Age: 79
End: 2024-05-25
Payer: MEDICARE

## 2024-05-25 VITALS
OXYGEN SATURATION: 97 % | DIASTOLIC BLOOD PRESSURE: 75 MMHG | BODY MASS INDEX: 26.06 KG/M2 | HEIGHT: 67 IN | TEMPERATURE: 97.6 F | HEART RATE: 65 BPM | RESPIRATION RATE: 14 BRPM | SYSTOLIC BLOOD PRESSURE: 136 MMHG | WEIGHT: 166 LBS

## 2024-05-25 DIAGNOSIS — S70.01XA TRAUMATIC HEMATOMA OF RIGHT HIP, INITIAL ENCOUNTER: Primary | ICD-10-CM

## 2024-05-25 PROCEDURE — 99284 EMERGENCY DEPT VISIT MOD MDM: CPT

## 2024-05-25 PROCEDURE — 72125 CT NECK SPINE W/O DYE: CPT

## 2024-05-25 PROCEDURE — 72192 CT PELVIS W/O DYE: CPT

## 2024-05-25 PROCEDURE — 70450 CT HEAD/BRAIN W/O DYE: CPT

## 2024-05-25 ASSESSMENT — PAIN - FUNCTIONAL ASSESSMENT
PAIN_FUNCTIONAL_ASSESSMENT: ACTIVITIES ARE NOT PREVENTED
PAIN_FUNCTIONAL_ASSESSMENT: 0-10

## 2024-05-25 ASSESSMENT — ENCOUNTER SYMPTOMS
SHORTNESS OF BREATH: 0
COUGH: 0
ABDOMINAL PAIN: 0
DIARRHEA: 0
VOMITING: 0
EYE REDNESS: 0
NAUSEA: 0
SORE THROAT: 0

## 2024-05-25 ASSESSMENT — PAIN DESCRIPTION - ORIENTATION: ORIENTATION: RIGHT

## 2024-05-25 ASSESSMENT — PAIN DESCRIPTION - DESCRIPTORS: DESCRIPTORS: ACHING

## 2024-05-25 ASSESSMENT — PAIN DESCRIPTION - LOCATION: LOCATION: PELVIS

## 2024-05-25 NOTE — ED TRIAGE NOTES
Trip and fall yesterday resulting in right hip/groin pelvis pain . Steady gait , + weight bearing , struck head , on thinners. Denies LOC

## 2024-05-25 NOTE — ED PROVIDER NOTES
EMERGENCY DEPARTMENT HISTORY AND PHYSICAL EXAM      Date: 5/25/2024  Patient Name: Rin Patterson    History of Presenting Illness     Chief Complaint   Patient presents with    Hip Pain       History Provided By: Patient    HPI: Rin Patterson, 79 y.o. female with past medical history listed below, presents via private vehicle to the ED with cc of fall.  Patient reports she fell yesterday.  She tripped over her dog.  She fell hitting her head and her right hip buttock area.  She complains of pain in her right hip and groin area and a mild headache.  She is on anticoagulants, Eliquis for chronic A-fib.  She denies any chest pain or shortness of breath.  No abdominal pain.  No nausea vomiting.  No vision changes.  No extremity weakness numbness or tingling.  She has been able to bear weight although painful on the right lower leg.        There are no other complaints, changes, or physical findings at this time.    PCP: James Lr MD    No current facility-administered medications on file prior to encounter.     Current Outpatient Medications on File Prior to Encounter   Medication Sig Dispense Refill    levothyroxine (SYNTHROID) 100 MCG tablet Take 1 tablet by mouth Daily 90 tablet 4    losartan-hydroCHLOROthiazide (HYZAAR) 100-25 MG per tablet Take 1 tablet by mouth nightly 90 tablet 4    metFORMIN (GLUCOPHAGE) 500 MG tablet TAKE 1 TABLET 3 TIMES DAILYWITH MEALS 270 tablet 3    rosuvastatin (CRESTOR) 10 MG tablet Take 1 tablet by mouth nightly 90 tablet 4    apixaban (ELIQUIS) 5 MG TABS tablet Take 1 tablet by mouth 2 times daily 180 tablet 2    amLODIPine (NORVASC) 2.5 MG tablet Take 1 tablet by mouth daily 90 tablet 4    metoprolol succinate (TOPROL XL) 100 MG extended release tablet TAKE 1 TABLET DAILY FOR    HEART AND PRESSURE         (REPLACES METOPROLOL       TARTRATE) 90 tablet 3    sertraline (ZOLOFT) 50 MG tablet Take 1 tablet by mouth daily 90 tablet 3    amLODIPine (NORVASC) 5 MG tablet Take 1  medications      * amLODIPine 2.5 MG tablet  Commonly known as: NORVASC  Take 1 tablet by mouth daily     * amLODIPine 5 MG tablet  Commonly known as: NORVASC  Take 1 tablet by mouth daily     apixaban 5 MG Tabs tablet  Commonly known as: ELIQUIS  Take 1 tablet by mouth 2 times daily     levothyroxine 100 MCG tablet  Commonly known as: SYNTHROID  Take 1 tablet by mouth Daily     losartan-hydroCHLOROthiazide 100-25 MG per tablet  Commonly known as: HYZAAR  Take 1 tablet by mouth nightly     metFORMIN 500 MG tablet  Commonly known as: GLUCOPHAGE  TAKE 1 TABLET 3 TIMES DAILYWITH MEALS     metoprolol succinate 100 MG extended release tablet  Commonly known as: TOPROL XL  TAKE 1 TABLET DAILY FOR    HEART AND PRESSURE         (REPLACES METOPROLOL       TARTRATE)     rosuvastatin 10 MG tablet  Commonly known as: CRESTOR  Take 1 tablet by mouth nightly     sertraline 50 MG tablet  Commonly known as: ZOLOFT  Take 1 tablet by mouth daily           * This list has 2 medication(s) that are the same as other medications prescribed for you. Read the directions carefully, and ask your doctor or other care provider to review them with you.                2. @[facility]@  Return to ED if worse     Diagnosis     Clinical Impression:   1. Traumatic hematoma of right hip, initial encounter              Please note that this dictation was completed with [employer], the computer voice recognition software.  Quite often unanticipated grammatical, syntax, homophones, and other interpretive errors are inadvertently transcribed by the computer software.  Please disregard these errors.  Please excuse any errors that have escaped final proofreading.       Oren Aldrich MD  05/25/24 [id number]

## 2024-06-20 ENCOUNTER — OFFICE VISIT (OUTPATIENT)
Age: 79
End: 2024-06-20
Payer: MEDICARE

## 2024-06-20 VITALS
HEART RATE: 73 BPM | HEIGHT: 67 IN | WEIGHT: 168 LBS | DIASTOLIC BLOOD PRESSURE: 65 MMHG | SYSTOLIC BLOOD PRESSURE: 149 MMHG | OXYGEN SATURATION: 97 % | BODY MASS INDEX: 26.37 KG/M2

## 2024-06-20 DIAGNOSIS — G47.33 OBSTRUCTIVE SLEEP APNEA (ADULT) (PEDIATRIC): Primary | ICD-10-CM

## 2024-06-20 DIAGNOSIS — I48.20 CHRONIC ATRIAL FIBRILLATION (HCC): ICD-10-CM

## 2024-06-20 DIAGNOSIS — I10 ESSENTIAL HYPERTENSION: ICD-10-CM

## 2024-06-20 PROCEDURE — G8399 PT W/DXA RESULTS DOCUMENT: HCPCS | Performed by: NURSE PRACTITIONER

## 2024-06-20 PROCEDURE — G8419 CALC BMI OUT NRM PARAM NOF/U: HCPCS | Performed by: NURSE PRACTITIONER

## 2024-06-20 PROCEDURE — 1036F TOBACCO NON-USER: CPT | Performed by: NURSE PRACTITIONER

## 2024-06-20 PROCEDURE — G8427 DOCREV CUR MEDS BY ELIG CLIN: HCPCS | Performed by: NURSE PRACTITIONER

## 2024-06-20 PROCEDURE — 99214 OFFICE O/P EST MOD 30 MIN: CPT | Performed by: NURSE PRACTITIONER

## 2024-06-20 PROCEDURE — 3077F SYST BP >= 140 MM HG: CPT | Performed by: NURSE PRACTITIONER

## 2024-06-20 PROCEDURE — 3078F DIAST BP <80 MM HG: CPT | Performed by: NURSE PRACTITIONER

## 2024-06-20 PROCEDURE — 1090F PRES/ABSN URINE INCON ASSESS: CPT | Performed by: NURSE PRACTITIONER

## 2024-06-20 PROCEDURE — 1123F ACP DISCUSS/DSCN MKR DOCD: CPT | Performed by: NURSE PRACTITIONER

## 2024-06-20 ASSESSMENT — SLEEP AND FATIGUE QUESTIONNAIRES
HOW LIKELY ARE YOU TO NOD OFF OR FALL ASLEEP WHILE SITTING AND READING: WOULD NEVER DOZE
HOW LIKELY ARE YOU TO NOD OFF OR FALL ASLEEP IN A CAR, WHILE STOPPED FOR A FEW MINUTES IN TRAFFIC: WOULD NEVER DOZE
HOW LIKELY ARE YOU TO NOD OFF OR FALL ASLEEP WHILE SITTING INACTIVE IN A PUBLIC PLACE: WOULD NEVER DOZE
HOW LIKELY ARE YOU TO NOD OFF OR FALL ASLEEP WHILE SITTING QUIETLY AFTER LUNCH WITHOUT ALCOHOL: WOULD NEVER DOZE
HOW LIKELY ARE YOU TO NOD OFF OR FALL ASLEEP WHILE SITTING AND TALKING TO SOMEONE: WOULD NEVER DOZE
ESS TOTAL SCORE: 2
HOW LIKELY ARE YOU TO NOD OFF OR FALL ASLEEP WHILE LYING DOWN TO REST IN THE AFTERNOON WHEN CIRCUMSTANCES PERMIT: SLIGHT CHANCE OF DOZING
HOW LIKELY ARE YOU TO NOD OFF OR FALL ASLEEP WHILE WATCHING TV: SLIGHT CHANCE OF DOZING
HOW LIKELY ARE YOU TO NOD OFF OR FALL ASLEEP WHEN YOU ARE A PASSENGER IN A CAR FOR AN HOUR WITHOUT A BREAK: WOULD NEVER DOZE

## 2024-06-20 NOTE — PATIENT INSTRUCTIONS
[address]  [place] [address]  Tel.  [phone]  Fax. [phone] [address]  [place] [address]  Tel.  [phone]  Fax. [phone] [address] [place] [address]  Tel.  [phone]  Fax. [phone]     Learning About CPAP for Sleep Apnea  What is CPAP?              CPAP is a small machine that you use at home every night while you sleep. It increases air pressure in your throat to keep your airway open. When you have sleep apnea, this can help you sleep better so you feel much better. CPAP stands for \"continuous positive airway pressure.\"  The CPAP machine will have one of the following:  A mask that covers your nose and mouth  Prongs that fit into your nose  A mask that covers your nose only, the most common type. This type is called NCPAP. The N stands for \"nasal.\"  Why is it done?  CPAP is usually the best treatment for obstructive sleep apnea. It is the first treatment choice and the most widely used. Your doctor may suggest CPAP if you have:  Moderate to severe sleep apnea.  Sleep apnea and coronary artery disease (CAD) or heart failure.  How does it help?  CPAP can help you have more normal sleep, so you feel less sleepy and more alert during the daytime.  CPAP may help keep heart failure or other heart problems from getting worse.  NCPAP may help lower your blood pressure.  If you use CPAP, your bed partner may also sleep better because you are not snoring or restless.  What are the side effects?  Some people who use CPAP have:  A dry or stuffy nose and a sore throat.  Irritated skin on the face.  Sore eyes.  Bloating.  If you have any of these problems, work with your doctor to fix them. Here are some things you can try:  Be sure the mask or nasal prongs fit well.  See if your doctor can adjust the pressure of your CPAP.  If your nose is dry, try a humidifier.  If your nose is runny or stuffy, try decongestant medicine or a steroid

## 2024-06-20 NOTE — PROGRESS NOTES
months.     Tubing with heating element 1 every 3 months.     Filter(s) Disposable 2 per month.   Filter(s) Non-Disposable 1 every 6 months.   .    Water Chamber for Humidifier (Replace) 1 every 6 months.      Adelina Rees, [facility]; NPI: [id number]    Electronically signed. Date:- 06/20/24       * Counseling was provided regarding the importance of regular PAP use with emphasis on ensuring sufficient total sleep time, proper sleep hygiene, and safe driving.    * Re-enforced proper and regular cleaning for the device and replacement of supplies.     * She was asked to contact our office for any problems regarding PAP therapy.      2. Hypertension -  continue on her current regimen, she will continue to monitor her BP and follow up with her PMD for reevaluation/adjustment of medications if warranted.  I have reviewed the relationship between hypertension as it relates to sleep-disordered breathing. She reports BP well managed when checked at home with current medications amlodipine 7.5 mg, metoprolol 100 mg,  losartan-HCTZ 100-25 mg      3. Encouraged continued weight management program through appropriate diet and exercise regimen as significant weight reduction has been shown to reduce severity of obstructive sleep apnea.     SUBJECTIVE/OBJECTIVE:    She  is seen today for follow up on PAP device and reports no problems using the device.   The following concerns identified:    Drowsiness no Problems exhaling no   Snoring no Forget to put on no   Mask Comfortable yes Can't fall asleep no   Dry Mouth no Mask falls off no   Air Leaking no Frequent awakenings no       She reports that her sleep has improved on PAP therapy using nasal mask and heated tubing.  She reports that she had a fall recently when walking the dog and was seen in ED 5/25/2024, notes reviewed.  She has recovered and is using PAP consistently, she needs to replace supplies including disposable filter.    Cardiac ECHO

## 2024-06-21 ENCOUNTER — CLINICAL DOCUMENTATION (OUTPATIENT)
Age: 79
End: 2024-06-21

## 2024-08-12 ENCOUNTER — OFFICE VISIT (OUTPATIENT)
Age: 79
End: 2024-08-12
Payer: MEDICARE

## 2024-08-12 VITALS
SYSTOLIC BLOOD PRESSURE: 164 MMHG | HEART RATE: 62 BPM | WEIGHT: 168 LBS | OXYGEN SATURATION: 97 % | DIASTOLIC BLOOD PRESSURE: 70 MMHG | BODY MASS INDEX: 26.37 KG/M2 | RESPIRATION RATE: 18 BRPM | HEIGHT: 67 IN

## 2024-08-12 DIAGNOSIS — I48.20 CHRONIC ATRIAL FIBRILLATION (HCC): Primary | ICD-10-CM

## 2024-08-12 DIAGNOSIS — N18.31 CHRONIC KIDNEY DISEASE, STAGE 3A (HCC): ICD-10-CM

## 2024-08-12 DIAGNOSIS — E11.21 TYPE 2 DIABETES MELLITUS WITH DIABETIC NEPHROPATHY, WITHOUT LONG-TERM CURRENT USE OF INSULIN (HCC): ICD-10-CM

## 2024-08-12 DIAGNOSIS — E03.4 HYPOTHYROIDISM DUE TO ACQUIRED ATROPHY OF THYROID: ICD-10-CM

## 2024-08-12 DIAGNOSIS — I10 ESSENTIAL HYPERTENSION: ICD-10-CM

## 2024-08-12 PROCEDURE — G8427 DOCREV CUR MEDS BY ELIG CLIN: HCPCS | Performed by: INTERNAL MEDICINE

## 2024-08-12 PROCEDURE — G8399 PT W/DXA RESULTS DOCUMENT: HCPCS | Performed by: INTERNAL MEDICINE

## 2024-08-12 PROCEDURE — 1090F PRES/ABSN URINE INCON ASSESS: CPT | Performed by: INTERNAL MEDICINE

## 2024-08-12 PROCEDURE — 99214 OFFICE O/P EST MOD 30 MIN: CPT | Performed by: INTERNAL MEDICINE

## 2024-08-12 PROCEDURE — 3044F HG A1C LEVEL LT 7.0%: CPT | Performed by: INTERNAL MEDICINE

## 2024-08-12 PROCEDURE — 3078F DIAST BP <80 MM HG: CPT | Performed by: INTERNAL MEDICINE

## 2024-08-12 PROCEDURE — 1123F ACP DISCUSS/DSCN MKR DOCD: CPT | Performed by: INTERNAL MEDICINE

## 2024-08-12 PROCEDURE — G8419 CALC BMI OUT NRM PARAM NOF/U: HCPCS | Performed by: INTERNAL MEDICINE

## 2024-08-12 PROCEDURE — 1036F TOBACCO NON-USER: CPT | Performed by: INTERNAL MEDICINE

## 2024-08-12 PROCEDURE — 3077F SYST BP >= 140 MM HG: CPT | Performed by: INTERNAL MEDICINE

## 2024-08-12 SDOH — ECONOMIC STABILITY: FOOD INSECURITY: WITHIN THE PAST 12 MONTHS, YOU WORRIED THAT YOUR FOOD WOULD RUN OUT BEFORE YOU GOT MONEY TO BUY MORE.: NEVER TRUE

## 2024-08-12 SDOH — ECONOMIC STABILITY: FOOD INSECURITY: WITHIN THE PAST 12 MONTHS, THE FOOD YOU BOUGHT JUST DIDN'T LAST AND YOU DIDN'T HAVE MONEY TO GET MORE.: NEVER TRUE

## 2024-08-12 SDOH — ECONOMIC STABILITY: INCOME INSECURITY: HOW HARD IS IT FOR YOU TO PAY FOR THE VERY BASICS LIKE FOOD, HOUSING, MEDICAL CARE, AND HEATING?: NOT HARD AT ALL

## 2024-08-12 ASSESSMENT — PATIENT HEALTH QUESTIONNAIRE - PHQ9
SUM OF ALL RESPONSES TO PHQ QUESTIONS 1-9: 0
1. LITTLE INTEREST OR PLEASURE IN DOING THINGS: NOT AT ALL
SUM OF ALL RESPONSES TO PHQ QUESTIONS 1-9: 0
SUM OF ALL RESPONSES TO PHQ9 QUESTIONS 1 & 2: 0
SUM OF ALL RESPONSES TO PHQ QUESTIONS 1-9: 0
2. FEELING DOWN, DEPRESSED OR HOPELESS: NOT AT ALL
SUM OF ALL RESPONSES TO PHQ QUESTIONS 1-9: 0

## 2024-08-12 NOTE — PROGRESS NOTES
1. Chronic atrial fibrillation (HCC)  Anticoagulated.    2. Type 2 diabetes mellitus with diabetic nephropathy, without long-term current use of insulin (HCC)  - Hemoglobin A1C; Future    3. Hypothyroidism due to acquired atrophy of thyroid  She has been overreplaced.  Checking labs  - TSH; Future  - T4, Free; Future    4. Essential hypertension  Unclear what is driving this--med compliance has been good.  She may schedule an appt with Valeria Solis.  Track BP and RTC in 30 days.  If BP remains up, consider a switch to a longer acting ARB.    - Aldosterone & Renin, Direct with Ratio; Future  - CBC with Auto Differential; Future  - Comprehensive Metabolic Panel; Future    5. Chronic kidney disease, stage 3a (HCC)  This may be driving her BP as well.         Chief Complaint   Patient presents with    Hypertension     Routine F/U    Diabetes     States does nto currently check glucose often. Denies any issues at this time.     Thyroid Problem     Routine 6 month check    Anxiety     Reports having more concerns with handling things.    Medication Check     States wants to discuss prevagen. States is finding her herself looking for things and becoming forgetful         Orders Placed This Encounter   Procedures    Aldosterone & Renin, Direct with Ratio     Standing Status:   Future     Standing Expiration Date:   8/12/2025    CBC with Auto Differential     Standing Status:   Future     Standing Expiration Date:   8/12/2025    Comprehensive Metabolic Panel     Standing Status:   Future     Standing Expiration Date:   8/12/2025    TSH     Standing Status:   Future     Standing Expiration Date:   8/12/2025    T4, Free     Standing Status:   Future     Standing Expiration Date:   8/12/2025    Hemoglobin A1C     Standing Status:   Future     Standing Expiration Date:   8/12/2025       James Lr MD, FACP      HPI:         is a 79 y.o. female who arrives for BP, DM and thyroid concerns.    TSH has been low for 
Needed No Help Needed   Getting dressed No Help Needed No Help Needed   Bathing or showering No Help Needed No Help Needed   Walk across the room (includes cane/walker) No Help Needed No Help Needed   Using the telphone No Help Needed No Help Needed   Taking your medications No Help Needed No Help Needed   Preparing meals No Help Needed No Help Needed   Managing money (expenses/bills) No Help Needed No Help Needed   Moderately strenuous housework (laundry) No Help Needed No Help Needed   Shopping for personal items (toiletries/medicines) No Help Needed No Help Needed   Shopping for groceries No Help Needed No Help Needed   Driving No Help Needed No Help Needed   Climbing a flight of stairs No Help Needed No Help Needed   Getting to places beyond walking distances No Help Needed No Help Needed           8/12/2024     9:00 AM   AMB Abuse Screening   Do you ever feel afraid of your partner? N   Are you in a relationship with someone who physically or mentally threatens you? N   Is it safe for you to go home? Y       Advance Care Planning     The patient has appointed the following active healthcare agents:    Primary Decision Maker: Zoran Patterson - [facility] - [phone]

## 2024-08-13 LAB
ALBUMIN SERPL-MCNC: 4.6 G/DL (ref 3.5–5)
ALBUMIN/GLOB SERPL: 1.3 (ref 1.1–2.2)
ALP SERPL-CCNC: 104 U/L (ref 45–117)
ALT SERPL-CCNC: 21 U/L (ref 12–78)
ANION GAP SERPL CALC-SCNC: 2 MMOL/L (ref 5–15)
AST SERPL-CCNC: 20 U/L (ref 15–37)
BASOPHILS # BLD: 0.1 K/UL (ref 0–0.1)
BASOPHILS NFR BLD: 1 % (ref 0–1)
BILIRUB SERPL-MCNC: 0.4 MG/DL (ref 0.2–1)
BUN SERPL-MCNC: 26 MG/DL (ref 6–20)
BUN/CREAT SERPL: 20 (ref 12–20)
CALCIUM SERPL-MCNC: 10.4 MG/DL (ref 8.5–10.1)
CHLORIDE SERPL-SCNC: 101 MMOL/L (ref 97–108)
CO2 SERPL-SCNC: 35 MMOL/L (ref 21–32)
CREAT SERPL-MCNC: 1.31 MG/DL (ref 0.55–1.02)
DIFFERENTIAL METHOD BLD: NORMAL
EOSINOPHIL # BLD: 0.2 K/UL (ref 0–0.4)
EOSINOPHIL NFR BLD: 3 % (ref 0–7)
ERYTHROCYTE [DISTWIDTH] IN BLOOD BY AUTOMATED COUNT: 12.5 % (ref 11.5–14.5)
EST. AVERAGE GLUCOSE BLD GHB EST-MCNC: 143 MG/DL
GLOBULIN SER CALC-MCNC: 3.5 G/DL (ref 2–4)
GLUCOSE SERPL-MCNC: 136 MG/DL (ref 65–100)
HBA1C MFR BLD: 6.6 % (ref 4–5.6)
HCT VFR BLD AUTO: 39.7 % (ref 35–47)
HGB BLD-MCNC: 12.8 G/DL (ref 11.5–16)
IMM GRANULOCYTES # BLD AUTO: 0 K/UL (ref 0–0.04)
IMM GRANULOCYTES NFR BLD AUTO: 0 % (ref 0–0.5)
LYMPHOCYTES # BLD: 2.8 K/UL (ref 0.8–3.5)
LYMPHOCYTES NFR BLD: 37 % (ref 12–49)
MCH RBC QN AUTO: 30.5 PG (ref 26–34)
MCHC RBC AUTO-ENTMCNC: 32.2 G/DL (ref 30–36.5)
MCV RBC AUTO: 94.5 FL (ref 80–99)
MONOCYTES # BLD: 0.7 K/UL (ref 0–1)
MONOCYTES NFR BLD: 9 % (ref 5–13)
NEUTS SEG # BLD: 3.8 K/UL (ref 1.8–8)
NEUTS SEG NFR BLD: 50 % (ref 32–75)
NRBC # BLD: 0 K/UL (ref 0–0.01)
NRBC BLD-RTO: 0 PER 100 WBC
PLATELET # BLD AUTO: 282 K/UL (ref 150–400)
PMV BLD AUTO: 11.3 FL (ref 8.9–12.9)
POTASSIUM SERPL-SCNC: 3.9 MMOL/L (ref 3.5–5.1)
PROT SERPL-MCNC: 8.1 G/DL (ref 6.4–8.2)
RBC # BLD AUTO: 4.2 M/UL (ref 3.8–5.2)
SODIUM SERPL-SCNC: 138 MMOL/L (ref 136–145)
T4 FREE SERPL-MCNC: 1.1 NG/DL (ref 0.8–1.5)
TSH SERPL DL<=0.05 MIU/L-ACNC: 2.81 UIU/ML (ref 0.36–3.74)
WBC # BLD AUTO: 7.6 K/UL (ref 3.6–11)

## 2024-08-14 NOTE — RESULT ENCOUNTER NOTE
Left a voicemail on her phone.  Labs look good--would benefit from an extra glass of water daily (BUN, Cr and Ca)  Will review any concerns at her next visit.

## 2024-08-16 LAB — ALDOST SERPL-MCNC: 9.8 NG/DL (ref 0–30)

## 2024-08-23 LAB
ALDOST SERPL-MCNC: 9.8 NG/DL (ref 0–30)
ALDOST/RENIN PLAS-RTO: 12 (ref 0–30)
RENIN PLAS-CCNC: 0.81 NG/ML/HR (ref 0.17–5.38)

## 2024-09-12 ENCOUNTER — TELEPHONE (OUTPATIENT)
Age: 79
End: 2024-09-12

## 2024-09-16 ENCOUNTER — NURSE ONLY (OUTPATIENT)
Age: 79
End: 2024-09-16

## 2024-09-16 DIAGNOSIS — Z23 NEED FOR VACCINATION: Primary | ICD-10-CM

## 2024-09-16 PROCEDURE — 90653 IIV ADJUVANT VACCINE IM: CPT | Performed by: INTERNAL MEDICINE

## 2024-09-16 PROCEDURE — G0008 ADMIN INFLUENZA VIRUS VAC: HCPCS | Performed by: INTERNAL MEDICINE

## 2025-01-12 NOTE — PROGRESS NOTES
Duration 30 minutes primarily education, review of imaging, labs and records.    Assessment & Plan  1. Atrial Fibrillation.  She was informed that atrial fibrillation is a lifelong condition, which can be managed with medications such as metoprolol and Eliquis. The importance of adherence to these medications was emphasized to prevent potential complications such as stroke. She was advised to consult with Dr. Hernandez if she experiences frequent symptoms such as shortness of breath or dizziness.    2. Immunization.  She was advised to receive her influenza vaccine in November. She was also recommended to get the new shingles vaccine, which consists of 2 doses, with the second dose administered 1 to 6 months after the first. It was suggested that she receive these vaccines at a pharmacy for cost-effectiveness. She was informed about the potential side effects of the shingles vaccine, including mild cases of shingles even after vaccination. She was also advised not to receive the influenza and shingles vaccines concurrently, but rather to space them a month apart.    She is up to date for Shingles, Influenza and Pneumonia.          Chief Complaint   Patient presents with    Hypertension    Atrial Fibrillation         No orders of the defined types were placed in this encounter.      James Lr MD, FACP      History of Present Illness  The patient presents for atrial fibrillation and immunization.    She reports that her Fitbit device has detected an episode of atrial fibrillation, prompting her to question the necessity of continued monitoring for this condition.    She expresses interest in receiving her influenza vaccine in November. She is uncertain about her influenza vaccination status for the current year. Additionally, she seeks clarification on whether she requires a second dose of the shingles vaccine, having received the first dose approximately 15 years ago.    MEDICATIONS  Current: metoprolol,

## 2025-01-17 ENCOUNTER — OFFICE VISIT (OUTPATIENT)
Age: 80
End: 2025-01-17

## 2025-01-17 VITALS
HEART RATE: 68 BPM | BODY MASS INDEX: 27.06 KG/M2 | RESPIRATION RATE: 18 BRPM | DIASTOLIC BLOOD PRESSURE: 64 MMHG | OXYGEN SATURATION: 96 % | HEIGHT: 67 IN | WEIGHT: 172.4 LBS | SYSTOLIC BLOOD PRESSURE: 158 MMHG | TEMPERATURE: 97.8 F

## 2025-01-17 DIAGNOSIS — I48.20 CHRONIC ATRIAL FIBRILLATION (HCC): ICD-10-CM

## 2025-01-17 DIAGNOSIS — N18.31 CHRONIC KIDNEY DISEASE, STAGE 3A (HCC): ICD-10-CM

## 2025-01-17 DIAGNOSIS — G31.84 MCI (MILD COGNITIVE IMPAIRMENT) WITH MEMORY LOSS: Primary | ICD-10-CM

## 2025-01-17 DIAGNOSIS — E11.21 TYPE 2 DIABETES MELLITUS WITH DIABETIC NEPHROPATHY, WITHOUT LONG-TERM CURRENT USE OF INSULIN (HCC): ICD-10-CM

## 2025-01-17 SDOH — ECONOMIC STABILITY: FOOD INSECURITY: WITHIN THE PAST 12 MONTHS, THE FOOD YOU BOUGHT JUST DIDN'T LAST AND YOU DIDN'T HAVE MONEY TO GET MORE.: NEVER TRUE

## 2025-01-17 SDOH — ECONOMIC STABILITY: FOOD INSECURITY: WITHIN THE PAST 12 MONTHS, YOU WORRIED THAT YOUR FOOD WOULD RUN OUT BEFORE YOU GOT MONEY TO BUY MORE.: NEVER TRUE

## 2025-01-17 ASSESSMENT — PATIENT HEALTH QUESTIONNAIRE - PHQ9
1. LITTLE INTEREST OR PLEASURE IN DOING THINGS: NOT AT ALL
SUM OF ALL RESPONSES TO PHQ9 QUESTIONS 1 & 2: 0
SUM OF ALL RESPONSES TO PHQ QUESTIONS 1-9: 0
SUM OF ALL RESPONSES TO PHQ QUESTIONS 1-9: 0
2. FEELING DOWN, DEPRESSED OR HOPELESS: NOT AT ALL
SUM OF ALL RESPONSES TO PHQ QUESTIONS 1-9: 0
SUM OF ALL RESPONSES TO PHQ QUESTIONS 1-9: 0

## 2025-01-17 NOTE — PROGRESS NOTES
Rin Patterson is a 80 y.o. female presenting for/with:    Chief Complaint   Patient presents with    Hypertension    Atrial Fibrillation       Vitals:    01/17/25 0943   BP: (!) 158/64   Site: Left Upper Arm   Position: Sitting   Cuff Size: Medium Adult   Pulse: 68   Resp: 18   Temp: 97.8 °F (36.6 °C)   TempSrc: Temporal   SpO2: 96%   Weight: 78.2 kg (172 lb 6.4 oz)   Height: 1.702 m (5' 7\")       Pain Scale: 0 - No pain/10  Pain Location:     \"Have you been to the ER, urgent care clinic since your last visit?  Hospitalized since your last visit?\"    NO    “Have you seen or consulted any other health care providers outside of [facility] since your last visit?”    NO                 1/17/2025     9:41 AM   PHQ-9    Little interest or pleasure in doing things 0   Feeling down, depressed, or hopeless 0   PHQ-2 Score 0   PHQ-9 Total Score 0           1/17/2025     9:50 AM 2/9/2023    12:00 AM 3/15/2022    12:00 AM 1/14/2022    12:00 AM   [facility] AMB LEARNING ASSESSMENT   Primary Learner Patient Patient Patient Patient   Primary Language ENGLISH ENGLISH ENGLISH ENGLISH   Learning Preference DEMONSTRATION LISTENING READING READING   Answered By patient self patient patient   Relationship to Learner SELF SELF SELF SELF            1/17/2025     9:41 AM   Amb Fall Risk Assessment and TUG Test   Do you feel unsteady or are you worried about falling?  no   2 or more falls in past year? no   Fall with injury in past year? yes           1/17/2025     9:00 AM 8/12/2024     9:00 AM 8/11/2023     8:00 AM   ADL ASSESSMENT   Feeding yourself No Help Needed No Help Needed No Help Needed   Getting from bed to chair No Help Needed No Help Needed No Help Needed   Getting dressed No Help Needed No Help Needed No Help Needed   Bathing or showering No Help Needed No Help Needed No Help Needed   Walk across the room (includes cane/walker) No Help Needed No Help Needed No Help Needed   Using the telphone No Help Needed No Help

## 2025-03-19 ENCOUNTER — TELEPHONE (OUTPATIENT)
Age: 80
End: 2025-03-19

## 2025-03-19 NOTE — TELEPHONE ENCOUNTER
VM left returning patient's daughter in law call regarding patient and her spouse coming together to be seen at upcoming [facility] appointment.

## 2025-04-10 DIAGNOSIS — I48.20 CHRONIC ATRIAL FIBRILLATION (HCC): ICD-10-CM

## 2025-04-11 DIAGNOSIS — E11.21 TYPE 2 DIABETES WITH NEPHROPATHY (HCC): ICD-10-CM

## 2025-04-11 RX ORDER — APIXABAN 5 MG/1
5 TABLET, FILM COATED ORAL 2 TIMES DAILY
Qty: 180 TABLET | Refills: 2 | Status: SHIPPED | OUTPATIENT
Start: 2025-04-11

## 2025-06-02 ENCOUNTER — OFFICE VISIT (OUTPATIENT)
Age: 80
End: 2025-06-02
Payer: MEDICARE

## 2025-06-02 VITALS
OXYGEN SATURATION: 99 % | DIASTOLIC BLOOD PRESSURE: 72 MMHG | RESPIRATION RATE: 18 BRPM | TEMPERATURE: 97.5 F | HEART RATE: 61 BPM | WEIGHT: 172 LBS | BODY MASS INDEX: 27 KG/M2 | HEIGHT: 67 IN | SYSTOLIC BLOOD PRESSURE: 164 MMHG

## 2025-06-02 DIAGNOSIS — E83.42 HYPOMAGNESEMIA: ICD-10-CM

## 2025-06-02 DIAGNOSIS — E03.4 HYPOTHYROIDISM DUE TO ACQUIRED ATROPHY OF THYROID: ICD-10-CM

## 2025-06-02 DIAGNOSIS — I10 ESSENTIAL HYPERTENSION: ICD-10-CM

## 2025-06-02 DIAGNOSIS — I48.20 CHRONIC ATRIAL FIBRILLATION (HCC): Primary | ICD-10-CM

## 2025-06-02 LAB
ALBUMIN SERPL-MCNC: 4.6 G/DL (ref 3.5–5)
ALBUMIN/GLOB SERPL: 1.2 (ref 1.1–2.2)
ALP SERPL-CCNC: 118 U/L (ref 45–117)
ALT SERPL-CCNC: 31 U/L (ref 12–78)
ANION GAP SERPL CALC-SCNC: 9 MMOL/L (ref 2–12)
AST SERPL-CCNC: 32 U/L (ref 15–37)
BILIRUB SERPL-MCNC: 0.3 MG/DL (ref 0.2–1)
BUN SERPL-MCNC: 31 MG/DL (ref 6–20)
BUN/CREAT SERPL: 20 (ref 12–20)
CALCIUM SERPL-MCNC: 10.4 MG/DL (ref 8.5–10.1)
CHLORIDE SERPL-SCNC: 101 MMOL/L (ref 97–108)
CO2 SERPL-SCNC: 27 MMOL/L (ref 21–32)
CREAT SERPL-MCNC: 1.52 MG/DL (ref 0.55–1.02)
GLOBULIN SER CALC-MCNC: 3.7 G/DL (ref 2–4)
GLUCOSE SERPL-MCNC: 119 MG/DL (ref 65–100)
MAGNESIUM SERPL-MCNC: 2.2 MG/DL (ref 1.6–2.4)
POTASSIUM SERPL-SCNC: 4.4 MMOL/L (ref 3.5–5.1)
PROT SERPL-MCNC: 8.3 G/DL (ref 6.4–8.2)
SODIUM SERPL-SCNC: 137 MMOL/L (ref 136–145)
TSH SERPL DL<=0.05 MIU/L-ACNC: 31.4 UIU/ML (ref 0.36–3.74)

## 2025-06-02 PROCEDURE — G8427 DOCREV CUR MEDS BY ELIG CLIN: HCPCS | Performed by: INTERNAL MEDICINE

## 2025-06-02 PROCEDURE — 93010 ELECTROCARDIOGRAM REPORT: CPT | Performed by: INTERNAL MEDICINE

## 2025-06-02 PROCEDURE — 3077F SYST BP >= 140 MM HG: CPT | Performed by: INTERNAL MEDICINE

## 2025-06-02 PROCEDURE — 99214 OFFICE O/P EST MOD 30 MIN: CPT | Performed by: INTERNAL MEDICINE

## 2025-06-02 PROCEDURE — 93005 ELECTROCARDIOGRAM TRACING: CPT | Performed by: INTERNAL MEDICINE

## 2025-06-02 PROCEDURE — G8399 PT W/DXA RESULTS DOCUMENT: HCPCS | Performed by: INTERNAL MEDICINE

## 2025-06-02 PROCEDURE — 1159F MED LIST DOCD IN RCRD: CPT | Performed by: INTERNAL MEDICINE

## 2025-06-02 PROCEDURE — 3078F DIAST BP <80 MM HG: CPT | Performed by: INTERNAL MEDICINE

## 2025-06-02 PROCEDURE — 1123F ACP DISCUSS/DSCN MKR DOCD: CPT | Performed by: INTERNAL MEDICINE

## 2025-06-02 PROCEDURE — 1036F TOBACCO NON-USER: CPT | Performed by: INTERNAL MEDICINE

## 2025-06-02 PROCEDURE — 1090F PRES/ABSN URINE INCON ASSESS: CPT | Performed by: INTERNAL MEDICINE

## 2025-06-02 PROCEDURE — G8419 CALC BMI OUT NRM PARAM NOF/U: HCPCS | Performed by: INTERNAL MEDICINE

## 2025-06-02 RX ORDER — AMLODIPINE BESYLATE 5 MG/1
5 TABLET ORAL DAILY
Qty: 90 TABLET | Refills: 4 | Status: SHIPPED
Start: 2025-06-02

## 2025-06-02 NOTE — PROGRESS NOTES
Duration 30 minutes primarily education, review of imaging, labs and records.    Assessment & Plan  1. Atrial Fibrillation.  - Her atrial fibrillation is well-managed with a heart rate consistently below 100, typically ranging between 75 and 85.  - Blood pressure readings were slightly elevated today, likely due to crossed legs during measurement.  - Currently on Eliquis for stroke prevention and metoprolol for heart rate control.  - Advised to monitor blood pressure and heart rate at home, particularly when experiencing dizziness. Encouraged to maintain adequate hydration without overhydrating. Informed about the potential for bleeding due to Eliquis and advised to report any instances of blood in her stool. Consultation with the cardiac electrophysiology team will be arranged within the next few weeks. Reassured that her condition is stable and does not require immediate emergency room visit.    2. Hypothyroidism.  - TSH levels were previously low, leading to a reduction in levothyroxine dosage from 112 mcg to 100 mcg.  - Prescription for levothyroxine 100 mcg daily will be sent to pharmacy.  - Blood work will be ordered to assess thyroid function and electrolyte levels.  - Follow-up appointment scheduled in 1 month.          No chief complaint on file.        Orders Placed This Encounter   Procedures    Comprehensive Metabolic Panel     Standing Status:   Future     Expected Date:   6/2/2025     Expiration Date:   6/2/2026    TSH     Standing Status:   Future     Expected Date:   6/2/2025     Expiration Date:   6/2/2026    Magnesium     Standing Status:   Future     Expected Date:   6/2/2025     Expiration Date:   6/2/2026    EKG 12 lead     Standing Status:   Future     Number of Occurrences:   1     Expected Date:   6/2/2025     Expiration Date:   8/1/2025     Reason for Exam?:   Irregular heart rate       James Lr MD, FACP      History of Present Illness  The patient is an 80-year-old female who

## 2025-06-03 ENCOUNTER — RESULTS FOLLOW-UP (OUTPATIENT)
Age: 80
End: 2025-06-03

## 2025-06-03 DIAGNOSIS — E03.4 HYPOTHYROIDISM DUE TO ACQUIRED ATROPHY OF THYROID: ICD-10-CM

## 2025-06-03 RX ORDER — LEVOTHYROXINE SODIUM 100 UG/1
100 TABLET ORAL DAILY
Qty: 90 TABLET | Refills: 4 | Status: SHIPPED | OUTPATIENT
Start: 2025-06-03

## 2025-06-03 NOTE — RESULT ENCOUNTER NOTE
I spoke with Mrs. Patterson and she is unable to locate any levothyroxine amongst her medications and admits that she may have been out of it for quite some time which might explain her TSH of 31.  Her last normal TSH was in August 2024 and at that time she was taking 100 mcg a day of levothyroxine.  I have refilled her levothyroxine for 100 mcg a day and send out to [facility] and she confirms that and will begin it tomorrow morning.  She has a follow-up appointment with us in July and we will need to check her TSH at that time    Her creatinine is slowly increased and we will need to repeat that along with a urinalysis at her next visit.    There is some concern about her compliance with medications at this point.  Whether that it is due to dementia or being hypothyroid remains unclear.

## 2025-06-10 DIAGNOSIS — I48.20 CHRONIC ATRIAL FIBRILLATION (HCC): ICD-10-CM

## 2025-06-10 NOTE — TELEPHONE ENCOUNTER
Pt called to say that Lorene will be calling Dr. Lr about an Eliquis presciption. The meds should be called into [facility] / [place] from there.    Thanks.

## 2025-06-11 DIAGNOSIS — I48.20 CHRONIC ATRIAL FIBRILLATION (HCC): ICD-10-CM

## 2025-06-11 NOTE — PROGRESS NOTES
After receiving the request for Eliquis 5 mg, we reviewed the chart and noted that she was 80 years of age and her GFR was 37.  Will use the 2.5 mg twice a day dose and I have contacted the pharmacy and the patient.  Both are aware.

## 2025-06-12 DIAGNOSIS — I48.20 CHRONIC ATRIAL FIBRILLATION (HCC): ICD-10-CM

## 2025-06-17 NOTE — PROGRESS NOTES
[address]  [place] [address]  Tel.  [phone]  Fax. [phone] [address]  [place] [address]  Tel.  [phone]  Fax. [phone] [address] [place] [address]  Tel.  [phone]  Fax. [phone]     S>Rin Patterson is a 80 y.o. female seen for a positive airway pressure follow-up and evaluation. She was last seen by Adelina Rees NP on 6/20/2024, previously seen by Dr. Beverly on 7/15/2020, prior notes and sleep testing reviewed in detail.  Initial sleep apnea diagnosis approx 2014. In lab split sleep test 9/2020 showed AHI of 33.9/hr with a lowest SaO2 of 88%, duration of SaO2 < 88% 0 min.   Weight at time of sleep testing 202 pounds.      She goes to bed  at 9 pm and rises at 6-7am, wakes by canine alarm.  She takes 1.5 hour nap 2 days weekly primarily because her spouse does.  She reports her sleep is restorative and that she does feel refreshed in the morning. She overall feels very well and denies daytime drowsiness. She reports good tolerance to her PAP device and she finds her nasal interface mask comfortable.      The following problems are identified:    Drowsiness no Problems exhaling no   Snoring no Forget to put on no   Mask Comfortable yes Can't fall asleep no   Dry Mouth no Mask falls off no   Air Leaking no Frequent awakenings no       She admits that her sleep has improved. Therapy Apnea Index averaged over PAP use in interim: 6.6 /hr which reflects improved sleep breathing condition.     Review of device download indicated (5/11/2025 to 6/9/2025):  Auto pressure: 10-16 cmH2O;     Peak Avg Pressure: 14.4 cmH2O     Avg. Device Pressure <= 90 %: 14.1 cmH2O    95th Percentile Leak: 7.8%     % Used Days >= 4 hours: 83.3%.    Avg hours used: 7 hours 56 minutes.      Allergies   Allergen Reactions    Penicillins Nausea And Vomiting and Rash       Current Outpatient Medications   Medication Sig Dispense Refill    apixaban

## 2025-06-19 ENCOUNTER — OFFICE VISIT (OUTPATIENT)
Age: 80
End: 2025-06-19
Payer: MEDICARE

## 2025-06-19 VITALS
DIASTOLIC BLOOD PRESSURE: 62 MMHG | BODY MASS INDEX: 26.53 KG/M2 | OXYGEN SATURATION: 96 % | HEART RATE: 67 BPM | HEIGHT: 67 IN | WEIGHT: 169 LBS | SYSTOLIC BLOOD PRESSURE: 154 MMHG

## 2025-06-19 DIAGNOSIS — G47.33 OBSTRUCTIVE SLEEP APNEA (ADULT) (PEDIATRIC): Primary | ICD-10-CM

## 2025-06-19 DIAGNOSIS — I10 ESSENTIAL HYPERTENSION: ICD-10-CM

## 2025-06-19 DIAGNOSIS — I48.20 CHRONIC ATRIAL FIBRILLATION (HCC): ICD-10-CM

## 2025-06-19 PROCEDURE — 99214 OFFICE O/P EST MOD 30 MIN: CPT | Performed by: NURSE PRACTITIONER

## 2025-06-19 PROCEDURE — 1123F ACP DISCUSS/DSCN MKR DOCD: CPT | Performed by: NURSE PRACTITIONER

## 2025-06-19 PROCEDURE — 1159F MED LIST DOCD IN RCRD: CPT | Performed by: NURSE PRACTITIONER

## 2025-06-19 PROCEDURE — 3077F SYST BP >= 140 MM HG: CPT | Performed by: NURSE PRACTITIONER

## 2025-06-19 PROCEDURE — 3078F DIAST BP <80 MM HG: CPT | Performed by: NURSE PRACTITIONER

## 2025-06-19 PROCEDURE — 1160F RVW MEDS BY RX/DR IN RCRD: CPT | Performed by: NURSE PRACTITIONER

## 2025-06-19 ASSESSMENT — SLEEP AND FATIGUE QUESTIONNAIRES
HOW LIKELY ARE YOU TO NOD OFF OR FALL ASLEEP WHILE LYING DOWN TO REST IN THE AFTERNOON WHEN CIRCUMSTANCES PERMIT: HIGH CHANCE OF DOZING
HOW LIKELY ARE YOU TO NOD OFF OR FALL ASLEEP WHILE SITTING QUIETLY AFTER LUNCH WITHOUT ALCOHOL: MODERATE CHANCE OF DOZING
HOW LIKELY ARE YOU TO NOD OFF OR FALL ASLEEP IN A CAR, WHILE STOPPED FOR A FEW MINUTES IN TRAFFIC: WOULD NEVER DOZE
HOW LIKELY ARE YOU TO NOD OFF OR FALL ASLEEP WHEN YOU ARE A PASSENGER IN A CAR FOR AN HOUR WITHOUT A BREAK: WOULD NEVER DOZE
HOW LIKELY ARE YOU TO NOD OFF OR FALL ASLEEP WHILE SITTING AND TALKING TO SOMEONE: WOULD NEVER DOZE
ESS TOTAL SCORE: 5
HOW LIKELY ARE YOU TO NOD OFF OR FALL ASLEEP WHILE SITTING AND READING: WOULD NEVER DOZE
HOW LIKELY ARE YOU TO NOD OFF OR FALL ASLEEP WHILE SITTING INACTIVE IN A PUBLIC PLACE: WOULD NEVER DOZE
HOW LIKELY ARE YOU TO NOD OFF OR FALL ASLEEP WHILE WATCHING TV: WOULD NEVER DOZE

## 2025-06-20 ENCOUNTER — TELEPHONE (OUTPATIENT)
Age: 80
End: 2025-06-20

## 2025-06-20 ENCOUNTER — CLINICAL DOCUMENTATION (OUTPATIENT)
Age: 80
End: 2025-06-20

## 2025-06-30 ENCOUNTER — TELEPHONE (OUTPATIENT)
Age: 80
End: 2025-06-30

## 2025-06-30 DIAGNOSIS — G47.33 OBSTRUCTIVE SLEEP APNEA (ADULT) (PEDIATRIC): Primary | ICD-10-CM

## 2025-07-20 NOTE — PROGRESS NOTES
SAWVMedicare Annual Wellness Visit    Rin Patterson is here for Medicare AWV (Has been having short term memory loss.)    Assessment & Plan   Medicare annual wellness visit, subsequent  Essential hypertension  -     Comprehensive Metabolic Panel; Future  -     CBC with Auto Differential; Future  Hypomagnesemia  Chronic atrial fibrillation (HCC)  Type 2 diabetes with nephropathy (HCC)  Hypothyroidism, unspecified type  -     TSH; Future  Hypercalcemia  -     PTH, Intact; Future  -     Gammopathy Eval, SPEP/MIRIAN, IG Qt/FLC; Future  Elevated glucose  -     Hemoglobin A1C; Future       No follow-ups on file.     Subjective       Patient's complete Health Risk Assessment and screening values have been reviewed and are found in Flowsheets. The following problems were reviewed today and where indicated follow up appointments were made and/or referrals ordered.    Positive Risk Factor Screenings with Interventions:    Fall Risk:  Do you feel unsteady or are you worried about falling? : no  2 or more falls in past year?: no  Fall with injury in past year?: (!) yes  Interventions:    Reviewed medications, home hazards, visual acuity, and co-morbidities that can increase risk for falls                                  Objective   Vitals:    07/25/25 1022   BP: 120/60   BP Site: Right Upper Arm   Patient Position: Sitting   BP Cuff Size: Medium Adult   Pulse: 67   Resp: 18   Temp: 97.3 °F (36.3 °C)   TempSrc: Temporal   SpO2: 97%   Weight: 72.1 kg (159 lb)      Body mass index is 24.9 kg/m².                    Allergies   Allergen Reactions    Penicillins Nausea And Vomiting and Rash     Prior to Visit Medications    Medication Sig Taking? Authorizing Provider   apixaban (ELIQUIS) 2.5 MG TABS tablet Take 1 tablet by mouth 2 times daily Yes James Lr MD   levothyroxine (SYNTHROID) 100 MCG tablet Take 1 tablet by mouth Daily Yes James Lr MD   amLODIPine (NORVASC) 5 MG tablet Take 1 tablet by mouth daily Yes

## 2025-07-25 ENCOUNTER — OFFICE VISIT (OUTPATIENT)
Age: 80
End: 2025-07-25
Payer: MEDICARE

## 2025-07-25 VITALS
OXYGEN SATURATION: 97 % | HEART RATE: 67 BPM | SYSTOLIC BLOOD PRESSURE: 120 MMHG | BODY MASS INDEX: 24.9 KG/M2 | WEIGHT: 159 LBS | RESPIRATION RATE: 18 BRPM | DIASTOLIC BLOOD PRESSURE: 60 MMHG | TEMPERATURE: 97.3 F

## 2025-07-25 DIAGNOSIS — E83.42 HYPOMAGNESEMIA: ICD-10-CM

## 2025-07-25 DIAGNOSIS — E11.21 TYPE 2 DIABETES WITH NEPHROPATHY (HCC): ICD-10-CM

## 2025-07-25 DIAGNOSIS — E83.52 HYPERCALCEMIA: ICD-10-CM

## 2025-07-25 DIAGNOSIS — I10 ESSENTIAL HYPERTENSION: ICD-10-CM

## 2025-07-25 DIAGNOSIS — Z00.00 MEDICARE ANNUAL WELLNESS VISIT, SUBSEQUENT: Primary | ICD-10-CM

## 2025-07-25 DIAGNOSIS — I48.20 CHRONIC ATRIAL FIBRILLATION (HCC): ICD-10-CM

## 2025-07-25 DIAGNOSIS — E03.9 HYPOTHYROIDISM, UNSPECIFIED TYPE: ICD-10-CM

## 2025-07-25 DIAGNOSIS — R73.09 ELEVATED GLUCOSE: ICD-10-CM

## 2025-07-25 PROCEDURE — G0439 PPPS, SUBSEQ VISIT: HCPCS | Performed by: INTERNAL MEDICINE

## 2025-07-25 PROCEDURE — G8427 DOCREV CUR MEDS BY ELIG CLIN: HCPCS | Performed by: INTERNAL MEDICINE

## 2025-07-25 PROCEDURE — 1123F ACP DISCUSS/DSCN MKR DOCD: CPT | Performed by: INTERNAL MEDICINE

## 2025-07-25 PROCEDURE — 3074F SYST BP LT 130 MM HG: CPT | Performed by: INTERNAL MEDICINE

## 2025-07-25 PROCEDURE — G2211 COMPLEX E/M VISIT ADD ON: HCPCS | Performed by: INTERNAL MEDICINE

## 2025-07-25 PROCEDURE — G8399 PT W/DXA RESULTS DOCUMENT: HCPCS | Performed by: INTERNAL MEDICINE

## 2025-07-25 PROCEDURE — G8420 CALC BMI NORM PARAMETERS: HCPCS | Performed by: INTERNAL MEDICINE

## 2025-07-25 PROCEDURE — 1036F TOBACCO NON-USER: CPT | Performed by: INTERNAL MEDICINE

## 2025-07-25 PROCEDURE — 1126F AMNT PAIN NOTED NONE PRSNT: CPT | Performed by: INTERNAL MEDICINE

## 2025-07-25 PROCEDURE — 3078F DIAST BP <80 MM HG: CPT | Performed by: INTERNAL MEDICINE

## 2025-07-25 PROCEDURE — 1090F PRES/ABSN URINE INCON ASSESS: CPT | Performed by: INTERNAL MEDICINE

## 2025-07-25 PROCEDURE — 1159F MED LIST DOCD IN RCRD: CPT | Performed by: INTERNAL MEDICINE

## 2025-07-25 PROCEDURE — 99214 OFFICE O/P EST MOD 30 MIN: CPT | Performed by: INTERNAL MEDICINE

## 2025-07-25 ASSESSMENT — PATIENT HEALTH QUESTIONNAIRE - PHQ9
2. FEELING DOWN, DEPRESSED OR HOPELESS: NOT AT ALL
1. LITTLE INTEREST OR PLEASURE IN DOING THINGS: NOT AT ALL
SUM OF ALL RESPONSES TO PHQ QUESTIONS 1-9: 0

## 2025-07-25 ASSESSMENT — LIFESTYLE VARIABLES
HOW OFTEN DO YOU HAVE A DRINK CONTAINING ALCOHOL: NEVER
HOW MANY STANDARD DRINKS CONTAINING ALCOHOL DO YOU HAVE ON A TYPICAL DAY: PATIENT DOES NOT DRINK

## 2025-07-25 NOTE — PROGRESS NOTES
Rin Patterson is a 80 y.o. female presenting for/with:    Chief Complaint   Patient presents with    Medicare AWV     Has been having short term memory loss.       Vitals:    07/25/25 1022   BP: 120/60   BP Site: Right Upper Arm   Patient Position: Sitting   BP Cuff Size: Medium Adult   Pulse: 67   Resp: 18   Temp: 97.3 °F (36.3 °C)   TempSrc: Temporal   SpO2: 97%   Weight: 72.1 kg (159 lb)       Pain Scale: 0 - No pain/10  Pain Location:     \"Have you been to the ER, urgent care clinic since your last visit?  Hospitalized since your last visit?\"    NO    “Have you seen or consulted any other health care providers outside of [facility] since your last visit?”    NO                 7/25/2025    10:16 AM   PHQ-9    Little interest or pleasure in doing things 0   Feeling down, depressed, or hopeless 0   PHQ-2 Score 0   PHQ-9 Total Score 0           1/17/2025     9:50 AM 2/9/2023    12:00 AM 3/15/2022    12:00 AM 1/14/2022    12:00 AM   [facility] AMB LEARNING ASSESSMENT   Primary Learner Patient Patient Patient Patient   Primary Language ENGLISH ENGLISH ENGLISH ENGLISH   Learning Preference DEMONSTRATION LISTENING READING READING   Answered By patient self patient patient   Relationship to Learner SELF SELF SELF SELF            7/25/2025    10:16 AM   Amb Fall Risk Assessment and TUG Test   Do you feel unsteady or are you worried about falling?  no   2 or more falls in past year? no   Fall with injury in past year? yes           1/17/2025     9:00 AM 8/12/2024     9:00 AM 8/11/2023     8:00 AM   ADL ASSESSMENT   Feeding yourself No Help Needed No Help Needed No Help Needed   Getting from bed to chair No Help Needed No Help Needed No Help Needed   Getting dressed No Help Needed No Help Needed No Help Needed   Bathing or showering No Help Needed No Help Needed No Help Needed   Walk across the room (includes cane/walker) No Help Needed No Help Needed No Help Needed   Using the telphone No Help Needed No Help

## 2025-07-25 NOTE — PATIENT INSTRUCTIONS
device in the bathroom with you.   Where can you learn more?  Go to https://www.[employer].net/patientEd and enter G117 to learn more about \"Preventing Falls: Care Instructions.\"  Current as of: July 31, 2024  Content Version: 14.5  © 3305-1978 [employer].   Care instructions adapted under license by [employer]. If you have questions about a medical condition or this instruction, always ask your healthcare professional. [employer] disclaims any warranty or liability for your use of this information.         A Healthy Heart: Care Instructions  Overview     Coronary artery disease, also called heart disease, occurs when a substance called plaque builds up in the vessels that supply oxygen-rich blood to your heart muscle. This can narrow the blood vessels and reduce blood flow. A heart attack happens when blood flow is completely blocked. A high-fat diet, smoking, and other factors increase the risk of heart disease.  Your doctor has found that you have a chance of having heart disease. A heart-healthy lifestyle can help keep your heart healthy and prevent heart disease. This lifestyle includes eating healthy, being active, staying at a weight that's healthy for you, and not smoking or using tobacco. It also includes taking medicines as directed, managing other health conditions, and trying to get a healthy amount of sleep.  Follow-up care is a key part of your treatment and safety. Be sure to make and go to all appointments, and call your doctor if you are having problems. It's also a good idea to know your test results and keep a list of the medicines you take.  How can you care for yourself at home?  Diet    Use less salt when you cook and eat. This helps lower your blood pressure. Taste food before salting. Add only a little salt when you think you need it. With time, your taste buds will adjust to less salt.     Eat fewer snack items, fast foods, canned soups, and other high-salt,

## 2025-07-25 NOTE — PROGRESS NOTES
Duration 30 minutes primarily education, review of imaging, labs and records.    Assessment & Plan  1. Short-term memory loss.  - Experiencing short-term memory loss over the past year to 18 months.  - Medications such as Aricept and Namenda discussed; noted limited efficacy in reversing symptoms.  - Potential use of infusion therapies discussed; highlighted high cost and limited efficacy.  - Supportive measures recommended, including maintaining a routine, using a notebook, family support, and regular visits from friends and family.    2. Hand discomfort.  - Reports discomfort in hands, particularly in the knuckles.  - Use of Voltaren gel and ice application for 5 to 10 minutes suggested as safe and effective treatment.  - Occasional use of Advil recommended for pain management.  - Option to switch to Tylenol if needed.    3. Blood pressure management.  - Blood pressure is well-controlled with current medication regimen.  - Currently taking losartan HCTZ, which can slightly elevate creatinine levels.  - Dosage of Eliquis adjusted to 2.5 mg twice a day based on age, weight, and creatinine levels.          Chief Complaint   Patient presents with    Medicare AWV     Has been having short term memory loss.         Orders Placed This Encounter   Procedures    TSH     Standing Status:   Future     Number of Occurrences:   1     Expected Date:   7/25/2025     Expiration Date:   7/25/2026    Comprehensive Metabolic Panel     Standing Status:   Future     Number of Occurrences:   1     Expected Date:   7/25/2025     Expiration Date:   7/25/2026    PTH, Intact     Standing Status:   Future     Number of Occurrences:   1     Expected Date:   7/25/2025     Expiration Date:   7/25/2026    Gammopathy Eval, SPEP/MIRIAN, IG Qt/FLC     Standing Status:   Future     Number of Occurrences:   1     Expected Date:   7/25/2025     Expiration Date:   7/25/2026    CBC with Auto Differential     Standing Status:   Future     Number of  x1

## 2025-07-26 DIAGNOSIS — I10 ESSENTIAL HYPERTENSION: ICD-10-CM

## 2025-07-26 DIAGNOSIS — F41.9 ANXIETY: ICD-10-CM

## 2025-07-26 DIAGNOSIS — I48.20 CHRONIC ATRIAL FIBRILLATION (HCC): ICD-10-CM

## 2025-07-26 LAB
ALBUMIN SERPL-MCNC: 4.9 G/DL (ref 3.5–5)
ALBUMIN/GLOB SERPL: 1.6 (ref 1.1–2.2)
ALP SERPL-CCNC: 98 U/L (ref 45–117)
ALT SERPL-CCNC: 28 U/L (ref 12–78)
ANION GAP SERPL CALC-SCNC: 7 MMOL/L (ref 2–12)
AST SERPL-CCNC: 31 U/L (ref 15–37)
BASOPHILS # BLD: 0.07 K/UL (ref 0–0.1)
BASOPHILS NFR BLD: 1 % (ref 0–1)
BILIRUB SERPL-MCNC: 0.5 MG/DL (ref 0.2–1)
BUN SERPL-MCNC: 28 MG/DL (ref 6–20)
BUN/CREAT SERPL: 19 (ref 12–20)
CALCIUM SERPL-MCNC: 10.1 MG/DL (ref 8.5–10.1)
CALCIUM SERPL-MCNC: 10.6 MG/DL (ref 8.5–10.1)
CHLORIDE SERPL-SCNC: 102 MMOL/L (ref 97–108)
CO2 SERPL-SCNC: 28 MMOL/L (ref 21–32)
CREAT SERPL-MCNC: 1.45 MG/DL (ref 0.55–1.02)
DIFFERENTIAL METHOD BLD: NORMAL
EOSINOPHIL # BLD: 0.17 K/UL (ref 0–0.4)
EOSINOPHIL NFR BLD: 2.4 % (ref 0–7)
ERYTHROCYTE [DISTWIDTH] IN BLOOD BY AUTOMATED COUNT: 12 % (ref 11.5–14.5)
EST. AVERAGE GLUCOSE BLD GHB EST-MCNC: 146 MG/DL
GLOBULIN SER CALC-MCNC: 3.1 G/DL (ref 2–4)
GLUCOSE SERPL-MCNC: 117 MG/DL (ref 65–100)
HBA1C MFR BLD: 6.7 % (ref 4–5.6)
HCT VFR BLD AUTO: 42 % (ref 35–47)
HGB BLD-MCNC: 13.6 G/DL (ref 11.5–16)
IMM GRANULOCYTES # BLD AUTO: 0.01 K/UL (ref 0–0.04)
IMM GRANULOCYTES NFR BLD AUTO: 0.1 % (ref 0–0.5)
LYMPHOCYTES # BLD: 2.5 K/UL (ref 0.8–3.5)
LYMPHOCYTES NFR BLD: 35.4 % (ref 12–49)
MCH RBC QN AUTO: 30.8 PG (ref 26–34)
MCHC RBC AUTO-ENTMCNC: 32.4 G/DL (ref 30–36.5)
MCV RBC AUTO: 95 FL (ref 80–99)
MONOCYTES # BLD: 0.62 K/UL (ref 0–1)
MONOCYTES NFR BLD: 8.8 % (ref 5–13)
NEUTS SEG # BLD: 3.7 K/UL (ref 1.8–8)
NEUTS SEG NFR BLD: 52.3 % (ref 32–75)
NRBC # BLD: 0 K/UL (ref 0–0.01)
NRBC BLD-RTO: 0 PER 100 WBC
PLATELET # BLD AUTO: 266 K/UL (ref 150–400)
PMV BLD AUTO: 11.2 FL (ref 8.9–12.9)
POTASSIUM SERPL-SCNC: 3.7 MMOL/L (ref 3.5–5.1)
PROT SERPL-MCNC: 8 G/DL (ref 6.4–8.2)
PTH-INTACT SERPL-MCNC: 40.9 PG/ML (ref 18.4–88)
RBC # BLD AUTO: 4.42 M/UL (ref 3.8–5.2)
SODIUM SERPL-SCNC: 137 MMOL/L (ref 136–145)
TSH SERPL DL<=0.05 MIU/L-ACNC: 0.03 UIU/ML (ref 0.36–3.74)
WBC # BLD AUTO: 7.1 K/UL (ref 3.6–11)

## 2025-07-27 RX ORDER — ROSUVASTATIN CALCIUM 10 MG/1
10 TABLET, COATED ORAL NIGHTLY
Qty: 90 TABLET | Refills: 4 | Status: SHIPPED | OUTPATIENT
Start: 2025-07-27

## 2025-07-27 RX ORDER — AMLODIPINE BESYLATE 2.5 MG/1
2.5 TABLET ORAL DAILY
Qty: 90 TABLET | Refills: 4 | Status: SHIPPED | OUTPATIENT
Start: 2025-07-27

## 2025-07-27 RX ORDER — LOSARTAN POTASSIUM AND HYDROCHLOROTHIAZIDE 25; 100 MG/1; MG/1
1 TABLET ORAL NIGHTLY
Qty: 90 TABLET | Refills: 4 | Status: SHIPPED | OUTPATIENT
Start: 2025-07-27

## 2025-07-27 RX ORDER — METOPROLOL SUCCINATE 100 MG/1
TABLET, EXTENDED RELEASE ORAL
Qty: 90 TABLET | Refills: 3 | Status: SHIPPED | OUTPATIENT
Start: 2025-07-27

## 2025-07-28 DIAGNOSIS — E03.4 HYPOTHYROIDISM DUE TO ACQUIRED ATROPHY OF THYROID: ICD-10-CM

## 2025-07-28 RX ORDER — LEVOTHYROXINE SODIUM 88 UG/1
88 TABLET ORAL DAILY
Qty: 90 TABLET | Refills: 4 | Status: SHIPPED | OUTPATIENT
Start: 2025-07-28

## 2025-07-30 LAB
ALBUMIN SERPL ELPH-MCNC: 4.5 G/DL (ref 2.9–4.4)
ALBUMIN/GLOB SERPL: 1.5 (ref 0.7–1.7)
ALPHA1 GLOB SERPL ELPH-MCNC: 0.1 G/DL (ref 0–0.4)
ALPHA2 GLOB SERPL ELPH-MCNC: 0.9 G/DL (ref 0.4–1)
B-GLOBULIN SERPL ELPH-MCNC: 1.2 G/DL (ref 0.7–1.3)
GAMMA GLOB SERPL ELPH-MCNC: 0.9 G/DL (ref 0.4–1.8)
GLOBULIN SER-MCNC: 3.1 G/DL (ref 2.2–3.9)
IGA SERPL-MCNC: 284 MG/DL (ref 64–422)
IGG SERPL-MCNC: 984 MG/DL (ref 586–1602)
IGM SERPL-MCNC: 55 MG/DL (ref 26–217)
INTERPRETATION SERPL IEP-IMP: ABNORMAL
KAPPA LC FREE SER-MCNC: 33 MG/L (ref 3.3–19.4)
KAPPA LC FREE/LAMBDA FREE SER: 1.48 (ref 0.26–1.65)
LAMBDA LC FREE SERPL-MCNC: 22.3 MG/L (ref 5.7–26.3)
M PROTEIN SERPL ELPH-MCNC: ABNORMAL G/DL
PROT SERPL-MCNC: 7.6 G/DL (ref 6–8.5)

## 2025-08-01 ENCOUNTER — TELEPHONE (OUTPATIENT)
Age: 80
End: 2025-08-01

## 2025-08-01 NOTE — TELEPHONE ENCOUNTER
Daughter -in-law wants to know if increasing the Zoloft would help memory? Another family member with memory issues  is taking this med and the PCP increased dosage which seems to be helping.

## 2025-08-27 ENCOUNTER — HOSPITAL ENCOUNTER (EMERGENCY)
Facility: HOSPITAL | Age: 80
Discharge: HOME | End: 2025-08-27
Attending: EMERGENCY MEDICINE
Payer: MEDICARE

## 2025-08-27 ENCOUNTER — TELEPHONE (OUTPATIENT)
Age: 80
End: 2025-08-27

## 2025-08-27 ENCOUNTER — APPOINTMENT (OUTPATIENT)
Facility: HOSPITAL | Age: 80
End: 2025-08-27
Payer: MEDICARE

## 2025-08-27 VITALS
HEIGHT: 67 IN | RESPIRATION RATE: 16 BRPM | SYSTOLIC BLOOD PRESSURE: 154 MMHG | OXYGEN SATURATION: 100 % | HEART RATE: 70 BPM | BODY MASS INDEX: 24.9 KG/M2 | TEMPERATURE: 98.2 F | DIASTOLIC BLOOD PRESSURE: 57 MMHG

## 2025-08-27 DIAGNOSIS — R07.89 ATYPICAL CHEST PAIN: ICD-10-CM

## 2025-08-27 DIAGNOSIS — U07.1 COVID-19: Primary | ICD-10-CM

## 2025-08-27 LAB
ALBUMIN SERPL-MCNC: 4.1 G/DL (ref 3.5–5)
ALBUMIN/GLOB SERPL: 1.2 (ref 1.1–2.2)
ALP SERPL-CCNC: 96 U/L (ref 45–117)
ALT SERPL-CCNC: 20 U/L (ref 12–78)
ANION GAP SERPL CALC-SCNC: 11 MMOL/L (ref 2–12)
AST SERPL-CCNC: 25 U/L (ref 15–37)
BASOPHILS # BLD: 0.06 K/UL (ref 0–0.1)
BASOPHILS NFR BLD: 1 % (ref 0–1)
BILIRUB SERPL-MCNC: 0.4 MG/DL (ref 0.2–1)
BUN SERPL-MCNC: 18 MG/DL (ref 6–20)
BUN/CREAT SERPL: 15 (ref 12–20)
CALCIUM SERPL-MCNC: 9.2 MG/DL (ref 8.5–10.1)
CHLORIDE SERPL-SCNC: 99 MMOL/L (ref 97–108)
CO2 SERPL-SCNC: 28 MMOL/L (ref 21–32)
CREAT SERPL-MCNC: 1.21 MG/DL (ref 0.55–1.02)
DIFFERENTIAL METHOD BLD: ABNORMAL
EOSINOPHIL # BLD: 0.05 K/UL (ref 0–0.4)
EOSINOPHIL NFR BLD: 0.8 % (ref 0–0.7)
ERYTHROCYTE [DISTWIDTH] IN BLOOD BY AUTOMATED COUNT: 12.4 % (ref 11.5–14.5)
FLUAV RNA SPEC QL NAA+PROBE: NOT DETECTED
FLUBV RNA SPEC QL NAA+PROBE: NOT DETECTED
GLOBULIN SER CALC-MCNC: 3.5 G/DL (ref 2–4)
GLUCOSE SERPL-MCNC: 133 MG/DL (ref 65–100)
HCT VFR BLD AUTO: 35.6 % (ref 35–47)
HGB BLD-MCNC: 12 G/DL (ref 11.5–16)
IMM GRANULOCYTES # BLD AUTO: 0.01 K/UL (ref 0–0.04)
IMM GRANULOCYTES NFR BLD AUTO: 0.2 % (ref 0–0.5)
LYMPHOCYTES # BLD: 0.62 K/UL (ref 0.8–3.5)
LYMPHOCYTES NFR BLD: 10.4 % (ref 12–49)
MCH RBC QN AUTO: 30.5 PG (ref 26–34)
MCHC RBC AUTO-ENTMCNC: 33.7 G/DL (ref 30–36.5)
MCV RBC AUTO: 90.4 FL (ref 80–99)
MONOCYTES # BLD: 1 K/UL (ref 0–1)
MONOCYTES NFR BLD: 16.6 % (ref 5–13)
NEUTS SEG # BLD: 4.26 K/UL (ref 1.8–8)
NEUTS SEG NFR BLD: 71 % (ref 32–75)
NRBC # BLD: 0 K/UL (ref 0–0.01)
NRBC BLD-RTO: 0 PER 100 WBC
PLATELET # BLD AUTO: 199 K/UL (ref 150–400)
PMV BLD AUTO: 9.9 FL (ref 8.9–12.9)
POTASSIUM SERPL-SCNC: 3.6 MMOL/L (ref 3.5–5.1)
PROT SERPL-MCNC: 7.6 G/DL (ref 6.4–8.2)
RBC # BLD AUTO: 3.94 M/UL (ref 3.8–5.2)
RBC MORPH BLD: ABNORMAL
SARS-COV-2 RNA RESP QL NAA+PROBE: DETECTED
SODIUM SERPL-SCNC: 138 MMOL/L (ref 136–145)
SOURCE: ABNORMAL
TROPONIN I SERPL HS-MCNC: 9 NG/L (ref 0–51)
WBC # BLD AUTO: 6 K/UL (ref 3.6–11)

## 2025-08-27 PROCEDURE — 99285 EMERGENCY DEPT VISIT HI MDM: CPT

## 2025-08-27 PROCEDURE — 85025 COMPLETE CBC W/AUTO DIFF WBC: CPT

## 2025-08-27 PROCEDURE — 36415 COLL VENOUS BLD VENIPUNCTURE: CPT

## 2025-08-27 PROCEDURE — 93005 ELECTROCARDIOGRAM TRACING: CPT | Performed by: EMERGENCY MEDICINE

## 2025-08-27 PROCEDURE — 71045 X-RAY EXAM CHEST 1 VIEW: CPT

## 2025-08-27 PROCEDURE — 87636 SARSCOV2 & INF A&B AMP PRB: CPT

## 2025-08-27 PROCEDURE — 80053 COMPREHEN METABOLIC PANEL: CPT

## 2025-08-27 PROCEDURE — 84484 ASSAY OF TROPONIN QUANT: CPT

## 2025-08-27 RX ORDER — BENZONATATE 100 MG/1
100 CAPSULE ORAL 3 TIMES DAILY PRN
Qty: 20 CAPSULE | Refills: 0 | Status: SHIPPED | OUTPATIENT
Start: 2025-08-27 | End: 2025-09-06

## 2025-08-27 RX ORDER — ONDANSETRON 4 MG/1
4 TABLET, ORALLY DISINTEGRATING ORAL 3 TIMES DAILY PRN
Qty: 21 TABLET | Refills: 0 | Status: SHIPPED | OUTPATIENT
Start: 2025-08-27

## 2025-08-27 ASSESSMENT — PAIN - FUNCTIONAL ASSESSMENT
PAIN_FUNCTIONAL_ASSESSMENT: 0-10
PAIN_FUNCTIONAL_ASSESSMENT: 0-10

## 2025-08-27 ASSESSMENT — PAIN DESCRIPTION - LOCATION: LOCATION: BACK

## 2025-08-27 ASSESSMENT — PAIN SCALES - GENERAL: PAINLEVEL_OUTOF10: 0

## 2025-08-29 LAB
EKG ATRIAL RATE: 66 BPM
EKG DIAGNOSIS: NORMAL
EKG P AXIS: 72 DEGREES
EKG P-R INTERVAL: 178 MS
EKG Q-T INTERVAL: 452 MS
EKG QRS DURATION: 92 MS
EKG QTC CALCULATION (BAZETT): 473 MS
EKG R AXIS: 59 DEGREES
EKG T AXIS: 21 DEGREES
EKG VENTRICULAR RATE: 66 BPM